# Patient Record
Sex: MALE | Race: BLACK OR AFRICAN AMERICAN | Employment: UNEMPLOYED | URBAN - METROPOLITAN AREA
[De-identification: names, ages, dates, MRNs, and addresses within clinical notes are randomized per-mention and may not be internally consistent; named-entity substitution may affect disease eponyms.]

---

## 2022-08-08 ENCOUNTER — HOSPITAL ENCOUNTER (INPATIENT)
Age: 42
LOS: 23 days | Discharge: NURSING, REHAB OR LONG-TERM CARE | DRG: 853 | End: 2022-09-01
Attending: EMERGENCY MEDICINE | Admitting: INTERNAL MEDICINE
Payer: MEDICARE

## 2022-08-08 ENCOUNTER — APPOINTMENT (OUTPATIENT)
Dept: GENERAL RADIOLOGY | Age: 42
DRG: 853 | End: 2022-08-08
Attending: EMERGENCY MEDICINE
Payer: MEDICARE

## 2022-08-08 DIAGNOSIS — S31.000D WOUND OF SACRAL REGION, SUBSEQUENT ENCOUNTER: ICD-10-CM

## 2022-08-08 DIAGNOSIS — N17.9: Primary | ICD-10-CM

## 2022-08-08 DIAGNOSIS — I48.0 PAF (PAROXYSMAL ATRIAL FIBRILLATION) (HCC): ICD-10-CM

## 2022-08-08 DIAGNOSIS — I50.20 HFREF (HEART FAILURE WITH REDUCED EJECTION FRACTION) (HCC): ICD-10-CM

## 2022-08-08 LAB
ALBUMIN SERPL-MCNC: 0.9 G/DL (ref 3.5–5)
ALBUMIN/GLOB SERPL: 0.2 {RATIO} (ref 1.1–2.2)
ALP SERPL-CCNC: 172 U/L (ref 45–117)
ALT SERPL-CCNC: 20 U/L (ref 12–78)
ANION GAP SERPL CALC-SCNC: 6 MMOL/L (ref 5–15)
AST SERPL-CCNC: 20 U/L (ref 15–37)
BASOPHILS # BLD: 0.1 K/UL (ref 0–0.1)
BASOPHILS NFR BLD: 1 % (ref 0–1)
BILIRUB SERPL-MCNC: 0.3 MG/DL (ref 0.2–1)
BUN SERPL-MCNC: 59 MG/DL (ref 6–20)
BUN/CREAT SERPL: 21 (ref 12–20)
CALCIUM SERPL-MCNC: 7.4 MG/DL (ref 8.5–10.1)
CHLORIDE SERPL-SCNC: 108 MMOL/L (ref 97–108)
CO2 SERPL-SCNC: 25 MMOL/L (ref 21–32)
CREAT SERPL-MCNC: 2.75 MG/DL (ref 0.7–1.3)
DIFFERENTIAL METHOD BLD: ABNORMAL
EOSINOPHIL # BLD: 0.1 K/UL (ref 0–0.4)
EOSINOPHIL NFR BLD: 2 % (ref 0–7)
ERYTHROCYTE [DISTWIDTH] IN BLOOD BY AUTOMATED COUNT: 15 % (ref 11.5–14.5)
GLOBULIN SER CALC-MCNC: 5.9 G/DL (ref 2–4)
GLUCOSE SERPL-MCNC: 130 MG/DL (ref 65–100)
HCT VFR BLD AUTO: 28.3 % (ref 36.6–50.3)
HGB BLD-MCNC: 8.9 G/DL (ref 12.1–17)
IMM GRANULOCYTES # BLD AUTO: 0 K/UL (ref 0–0.04)
IMM GRANULOCYTES NFR BLD AUTO: 0 % (ref 0–0.5)
LYMPHOCYTES # BLD: 1.5 K/UL (ref 0.8–3.5)
LYMPHOCYTES NFR BLD: 17 % (ref 12–49)
MCH RBC QN AUTO: 27.8 PG (ref 26–34)
MCHC RBC AUTO-ENTMCNC: 31.4 G/DL (ref 30–36.5)
MCV RBC AUTO: 88.4 FL (ref 80–99)
MONOCYTES # BLD: 0.9 K/UL (ref 0–1)
MONOCYTES NFR BLD: 10 % (ref 5–13)
NEUTS SEG # BLD: 6.4 K/UL (ref 1.8–8)
NEUTS SEG NFR BLD: 70 % (ref 32–75)
NRBC # BLD: 0 K/UL (ref 0–0.01)
NRBC BLD-RTO: 0 PER 100 WBC
PLATELET # BLD AUTO: 242 K/UL (ref 150–400)
PMV BLD AUTO: 11.4 FL (ref 8.9–12.9)
POTASSIUM SERPL-SCNC: 5.6 MMOL/L (ref 3.5–5.1)
PROT SERPL-MCNC: 6.8 G/DL (ref 6.4–8.2)
RBC # BLD AUTO: 3.2 M/UL (ref 4.1–5.7)
SODIUM SERPL-SCNC: 139 MMOL/L (ref 136–145)
WBC # BLD AUTO: 9.1 K/UL (ref 4.1–11.1)

## 2022-08-08 PROCEDURE — 74011250636 HC RX REV CODE- 250/636: Performed by: EMERGENCY MEDICINE

## 2022-08-08 PROCEDURE — 71045 X-RAY EXAM CHEST 1 VIEW: CPT

## 2022-08-08 PROCEDURE — 80053 COMPREHEN METABOLIC PANEL: CPT

## 2022-08-08 PROCEDURE — 99285 EMERGENCY DEPT VISIT HI MDM: CPT

## 2022-08-08 PROCEDURE — 94002 VENT MGMT INPAT INIT DAY: CPT

## 2022-08-08 PROCEDURE — 85025 COMPLETE CBC W/AUTO DIFF WBC: CPT

## 2022-08-08 PROCEDURE — 74011000250 HC RX REV CODE- 250: Performed by: NURSE PRACTITIONER

## 2022-08-08 PROCEDURE — 36415 COLL VENOUS BLD VENIPUNCTURE: CPT

## 2022-08-08 PROCEDURE — 51798 US URINE CAPACITY MEASURE: CPT

## 2022-08-08 RX ORDER — SODIUM CHLORIDE 9 MG/ML
500 INJECTION, SOLUTION INTRAVENOUS ONCE
Status: COMPLETED | OUTPATIENT
Start: 2022-08-08 | End: 2022-08-08

## 2022-08-08 RX ORDER — SODIUM CHLORIDE 0.9 % (FLUSH) 0.9 %
5-40 SYRINGE (ML) INJECTION EVERY 8 HOURS
Status: DISCONTINUED | OUTPATIENT
Start: 2022-08-08 | End: 2022-09-01 | Stop reason: HOSPADM

## 2022-08-08 RX ORDER — CHLORHEXIDINE GLUCONATE 1.2 MG/ML
15 RINSE ORAL EVERY 12 HOURS
Status: DISCONTINUED | OUTPATIENT
Start: 2022-08-08 | End: 2022-09-01 | Stop reason: HOSPADM

## 2022-08-08 RX ORDER — ONDANSETRON 4 MG/1
4 TABLET, ORALLY DISINTEGRATING ORAL
Status: DISCONTINUED | OUTPATIENT
Start: 2022-08-08 | End: 2022-09-01 | Stop reason: HOSPADM

## 2022-08-08 RX ORDER — ACETAMINOPHEN 325 MG/1
650 TABLET ORAL
Status: DISCONTINUED | OUTPATIENT
Start: 2022-08-08 | End: 2022-09-01 | Stop reason: HOSPADM

## 2022-08-08 RX ORDER — POLYETHYLENE GLYCOL 3350 17 G/17G
17 POWDER, FOR SOLUTION ORAL DAILY PRN
Status: DISCONTINUED | OUTPATIENT
Start: 2022-08-08 | End: 2022-09-01 | Stop reason: HOSPADM

## 2022-08-08 RX ORDER — ONDANSETRON 2 MG/ML
4 INJECTION INTRAMUSCULAR; INTRAVENOUS
Status: DISCONTINUED | OUTPATIENT
Start: 2022-08-08 | End: 2022-09-01 | Stop reason: HOSPADM

## 2022-08-08 RX ORDER — ACETAMINOPHEN 650 MG/1
650 SUPPOSITORY RECTAL
Status: DISCONTINUED | OUTPATIENT
Start: 2022-08-08 | End: 2022-09-01 | Stop reason: HOSPADM

## 2022-08-08 RX ORDER — SODIUM CHLORIDE 0.9 % (FLUSH) 0.9 %
5-40 SYRINGE (ML) INJECTION AS NEEDED
Status: DISCONTINUED | OUTPATIENT
Start: 2022-08-08 | End: 2022-09-01 | Stop reason: HOSPADM

## 2022-08-08 RX ADMIN — Medication 10 ML: at 22:00

## 2022-08-08 RX ADMIN — SODIUM CHLORIDE 500 ML: 900 INJECTION, SOLUTION INTRAVENOUS at 19:30

## 2022-08-08 NOTE — ED PROVIDER NOTES
Date of Service:  8/8/2022    Patient:  Thu Whitman    Chief Complaint:  Need for dialysis    HPI:  Thu Whitman is a 38 y.o.  male who presents for evaluation of need for CRRT. Patient from [address] [place] [address] where he had a complicated recent series of events including multiple PEA arrest, LifeVest placement and that is now been removed, currently septic who has been having worsening renal function and is in need of CRRT. Patient is a partial code, chemical medications only, no CPR. Currently trach in place. No other reported information       No past medical history on file. No past surgical history on file. No family history on file. Social History     Socioeconomic History    Marital status: Not on file     Spouse name: Not on file    Number of children: Not on file    Years of education: Not on file    Highest education level: Not on file   Occupational History    Not on file   Tobacco Use    Smoking status: Not on file    Smokeless tobacco: Not on file   Substance and Sexual Activity    Alcohol use: Not on file    Drug use: Not on file    Sexual activity: Not on file   Other Topics Concern    Not on file   Social History Narrative    Not on file     Social Determinants of Health     Financial Resource Strain: Not on file   Food Insecurity: Not on file   Transportation Needs: Not on file   Physical Activity: Not on file   Stress: Not on file   Social Connections: Not on file   Intimate Partner Violence: Not on file   Housing Stability: Not on file         ALLERGIES: Patient has no allergy information on record. Review of Systems   All other systems reviewed and are negative. There were no vitals filed for this visit. Physical Exam  Vitals and nursing note reviewed. Constitutional:       Appearance: Normal appearance. HENT:      Head: Normocephalic.       Nose: Nose normal.      Mouth/Throat:      Mouth: Mucous membranes are moist.   Eyes:      Extraocular Movements: Extraocular movements intact. Cardiovascular:      Rate and Rhythm: Normal rate. Pulmonary:      Effort: Pulmonary effort is normal.   Abdominal:      General: Abdomen is flat. Musculoskeletal:         General: No deformity. Skin:     General: Skin is warm. Capillary Refill: Capillary refill takes less than 2 seconds. Neurological:      Mental Status: Mental status is at baseline. Psychiatric:         Mood and Affect: Mood normal.        MDM     VITAL SIGNS:  Patient Vitals for the past 4 hrs:   Temp Pulse Resp BP SpO2   08/08/22 2031 98 °F (36.7 °C) 86 17 108/89 100 %   08/08/22 1929 98 °F (36.7 °C) 95 24 (!) 80/68 100 %         LABS:  Recent Results (from the past 6 hour(s))   CBC WITH AUTOMATED DIFF    Collection Time: 08/08/22  7:36 PM   Result Value Ref Range    WBC 9.1 4.1 - 11.1 K/uL    RBC 3.20 (L) 4.10 - 5.70 M/uL    HGB 8.9 (L) 12.1 - 17.0 g/dL    HCT 28.3 (L) 36.6 - 50.3 %    MCV 88.4 80.0 - 99.0 FL    MCH 27.8 26.0 - 34.0 PG    MCHC 31.4 30.0 - 36.5 g/dL    RDW 15.0 (H) 11.5 - 14.5 %    PLATELET 841 606 - 923 K/uL    MPV 11.4 8.9 - 12.9 FL    NRBC 0.0 0  WBC    ABSOLUTE NRBC 0.00 0.00 - 0.01 K/uL    NEUTROPHILS 70 32 - 75 %    LYMPHOCYTES 17 12 - 49 %    MONOCYTES 10 5 - 13 %    EOSINOPHILS 2 0 - 7 %    BASOPHILS 1 0 - 1 %    IMMATURE GRANULOCYTES 0 0.0 - 0.5 %    ABS. NEUTROPHILS 6.4 1.8 - 8.0 K/UL    ABS. LYMPHOCYTES 1.5 0.8 - 3.5 K/UL    ABS. MONOCYTES 0.9 0.0 - 1.0 K/UL    ABS. EOSINOPHILS 0.1 0.0 - 0.4 K/UL    ABS. BASOPHILS 0.1 0.0 - 0.1 K/UL    ABS. IMM.  GRANS. 0.0 0.00 - 0.04 K/UL    DF AUTOMATED     METABOLIC PANEL, COMPREHENSIVE    Collection Time: 08/08/22  7:36 PM   Result Value Ref Range    Sodium 139 136 - 145 mmol/L    Potassium 5.6 (H) 3.5 - 5.1 mmol/L    Chloride 108 97 - 108 mmol/L    CO2 25 21 - 32 mmol/L    Anion gap 6 5 - 15 mmol/L    Glucose 130 (H) 65 - 100 mg/dL    BUN 59 (H) 6 - 20 MG/DL    Creatinine 2.75 (H) 0.70 - 1.30 MG/DL    BUN/Creatinine ratio 21 (H) 12 - 20      GFR est AA 31 (L) >60 ml/min/1.73m2    GFR est non-AA 25 (L) >60 ml/min/1.73m2    Calcium 7.4 (L) 8.5 - 10.1 MG/DL    Bilirubin, total 0.3 0.2 - 1.0 MG/DL    ALT (SGPT) 20 12 - 78 U/L    AST (SGOT) 20 15 - 37 U/L    Alk. phosphatase 172 (H) 45 - 117 U/L    Protein, total 6.8 6.4 - 8.2 g/dL    Albumin 0.9 (L) 3.5 - 5.0 g/dL    Globulin 5.9 (H) 2.0 - 4.0 g/dL    A-G Ratio 0.2 (L) 1.1 - 2.2          IMAGING:  XR CHEST PORT   Final Result   Bibasilar haziness as discussed. Medications During Visit:  Medications   0.9% sodium chloride infusion 500 mL (500 mL IntraVENous New Bag 8/8/22 1930)         DECISION MAKING:  Pat Mace is a 38 y.o. male who comes in as above. Here, patient is awake alert answer questions yes or no but shaking his head. Blood pressure is improving. Currently under treatment for sepsis related to pneumonia. I have spoken with Dr. Candace Lamb with [address] [place] [address]. He is reaching out to [address] [place] [address]. At this time patient will be admitted to the ICU given his need for ventilator management. I discussed CRRT with nephrology, this is the reason he was sent here. IMPRESSION:  1.  Encounter for continuous renal replacement therapy (CRRT) for acute renal failure ([facility]        DISPOSITION:  Admitted      Procedures

## 2022-08-08 NOTE — ED TRIAGE NOTES
Pt arrives from [facility] for CRRT, pt is on trach/vent collar, has loya and RUE PICC in place. Pt reports no pain. Hypotensive but not tachy. Pt is chemical code, no compressions but meds are ok.

## 2022-08-09 ENCOUNTER — APPOINTMENT (OUTPATIENT)
Dept: ULTRASOUND IMAGING | Age: 42
DRG: 853 | End: 2022-08-09
Attending: INTERNAL MEDICINE
Payer: MEDICARE

## 2022-08-09 PROBLEM — N17.9 AKI (ACUTE KIDNEY INJURY) (HCC): Status: ACTIVE | Noted: 2022-08-09

## 2022-08-09 LAB
ANION GAP SERPL CALC-SCNC: 4 MMOL/L (ref 5–15)
APPEARANCE UR: ABNORMAL
ATRIAL RATE: 79 BPM
BACTERIA URNS QL MICRO: ABNORMAL /HPF
BILIRUB UR QL: NEGATIVE
BUN SERPL-MCNC: 63 MG/DL (ref 6–20)
BUN/CREAT SERPL: 22 (ref 12–20)
CALCIUM SERPL-MCNC: 7.7 MG/DL (ref 8.5–10.1)
CALCULATED P AXIS, ECG09: 86 DEGREES
CALCULATED R AXIS, ECG10: -21 DEGREES
CALCULATED T AXIS, ECG11: -156 DEGREES
CHLORIDE SERPL-SCNC: 110 MMOL/L (ref 97–108)
CO2 SERPL-SCNC: 26 MMOL/L (ref 21–32)
COLOR UR: ABNORMAL
CREAT SERPL-MCNC: 2.88 MG/DL (ref 0.7–1.3)
DIAGNOSIS, 93000: NORMAL
EPITH CASTS URNS QL MICRO: ABNORMAL /LPF
ERYTHROCYTE [DISTWIDTH] IN BLOOD BY AUTOMATED COUNT: 15 % (ref 11.5–14.5)
GLUCOSE BLD STRIP.AUTO-MCNC: 141 MG/DL (ref 65–117)
GLUCOSE BLD STRIP.AUTO-MCNC: 36 MG/DL (ref 65–117)
GLUCOSE BLD STRIP.AUTO-MCNC: 82 MG/DL (ref 65–117)
GLUCOSE BLD STRIP.AUTO-MCNC: 87 MG/DL (ref 65–117)
GLUCOSE SERPL-MCNC: 34 MG/DL (ref 65–100)
GLUCOSE UR STRIP.AUTO-MCNC: NEGATIVE MG/DL
GRAN CASTS URNS QL MICRO: ABNORMAL /LPF
HBV SURFACE AB SER QL: NONREACTIVE
HBV SURFACE AB SER-ACNC: <3.1 MIU/ML
HBV SURFACE AG SER QL: <0.1 INDEX
HBV SURFACE AG SER QL: NEGATIVE
HCT VFR BLD AUTO: 27.8 % (ref 36.6–50.3)
HGB BLD-MCNC: 8.9 G/DL (ref 12.1–17)
HGB UR QL STRIP: ABNORMAL
KETONES UR QL STRIP.AUTO: NEGATIVE MG/DL
LEUKOCYTE ESTERASE UR QL STRIP.AUTO: ABNORMAL
MAGNESIUM SERPL-MCNC: 2.2 MG/DL (ref 1.6–2.4)
MCH RBC QN AUTO: 27.8 PG (ref 26–34)
MCHC RBC AUTO-ENTMCNC: 32 G/DL (ref 30–36.5)
MCV RBC AUTO: 86.9 FL (ref 80–99)
NITRITE UR QL STRIP.AUTO: NEGATIVE
NRBC # BLD: 0 K/UL (ref 0–0.01)
NRBC BLD-RTO: 0 PER 100 WBC
P-R INTERVAL, ECG05: 200 MS
PH UR STRIP: 6.5 [PH] (ref 5–8)
PLATELET # BLD AUTO: 198 K/UL (ref 150–400)
PMV BLD AUTO: 11.5 FL (ref 8.9–12.9)
POTASSIUM SERPL-SCNC: 5.5 MMOL/L (ref 3.5–5.1)
PROCALCITONIN SERPL-MCNC: 1.79 NG/ML
PROT UR STRIP-MCNC: 100 MG/DL
Q-T INTERVAL, ECG07: 454 MS
QRS DURATION, ECG06: 160 MS
QTC CALCULATION (BEZET), ECG08: 520 MS
RBC # BLD AUTO: 3.2 M/UL (ref 4.1–5.7)
RBC #/AREA URNS HPF: ABNORMAL /HPF (ref 0–5)
SERVICE CMNT-IMP: ABNORMAL
SERVICE CMNT-IMP: ABNORMAL
SERVICE CMNT-IMP: NORMAL
SERVICE CMNT-IMP: NORMAL
SODIUM SERPL-SCNC: 140 MMOL/L (ref 136–145)
SP GR UR REFRACTOMETRY: 1.01 (ref 1–1.03)
UA: UC IF INDICATED,UAUC: ABNORMAL
UROBILINOGEN UR QL STRIP.AUTO: 0.2 EU/DL (ref 0.2–1)
VENTRICULAR RATE, ECG03: 79 BPM
WBC # BLD AUTO: 8.2 K/UL (ref 4.1–11.1)
WBC URNS QL MICRO: ABNORMAL /HPF (ref 0–4)
YEAST BUDDING URNS QL: PRESENT
YEAST URNS QL MICRO: PRESENT

## 2022-08-09 PROCEDURE — 74011000250 HC RX REV CODE- 250: Performed by: INTERNAL MEDICINE

## 2022-08-09 PROCEDURE — P9045 ALBUMIN (HUMAN), 5%, 250 ML: HCPCS

## 2022-08-09 PROCEDURE — 74011250636 HC RX REV CODE- 250/636: Performed by: INTERNAL MEDICINE

## 2022-08-09 PROCEDURE — 74011000258 HC RX REV CODE- 258: Performed by: INTERNAL MEDICINE

## 2022-08-09 PROCEDURE — 83735 ASSAY OF MAGNESIUM: CPT

## 2022-08-09 PROCEDURE — 36600 WITHDRAWAL OF ARTERIAL BLOOD: CPT

## 2022-08-09 PROCEDURE — 5A1D70Z PERFORMANCE OF URINARY FILTRATION, INTERMITTENT, LESS THAN 6 HOURS PER DAY: ICD-10-PCS | Performed by: STUDENT IN AN ORGANIZED HEALTH CARE EDUCATION/TRAINING PROGRAM

## 2022-08-09 PROCEDURE — 87077 CULTURE AEROBIC IDENTIFY: CPT

## 2022-08-09 PROCEDURE — 87040 BLOOD CULTURE FOR BACTERIA: CPT

## 2022-08-09 PROCEDURE — 74011250636 HC RX REV CODE- 250/636: Performed by: STUDENT IN AN ORGANIZED HEALTH CARE EDUCATION/TRAINING PROGRAM

## 2022-08-09 PROCEDURE — 77030018798 HC PMP KT ENTRL FED COVD -A

## 2022-08-09 PROCEDURE — 36415 COLL VENOUS BLD VENIPUNCTURE: CPT

## 2022-08-09 PROCEDURE — 74011000250 HC RX REV CODE- 250: Performed by: STUDENT IN AN ORGANIZED HEALTH CARE EDUCATION/TRAINING PROGRAM

## 2022-08-09 PROCEDURE — 87070 CULTURE OTHR SPECIMN AEROBIC: CPT

## 2022-08-09 PROCEDURE — 5A1D90Z PERFORMANCE OF URINARY FILTRATION, CONTINUOUS, GREATER THAN 18 HOURS PER DAY: ICD-10-PCS | Performed by: STUDENT IN AN ORGANIZED HEALTH CARE EDUCATION/TRAINING PROGRAM

## 2022-08-09 PROCEDURE — 87086 URINE CULTURE/COLONY COUNT: CPT

## 2022-08-09 PROCEDURE — 74011250636 HC RX REV CODE- 250/636

## 2022-08-09 PROCEDURE — 74011250637 HC RX REV CODE- 250/637: Performed by: NURSE PRACTITIONER

## 2022-08-09 PROCEDURE — 85027 COMPLETE CBC AUTOMATED: CPT

## 2022-08-09 PROCEDURE — 84145 PROCALCITONIN (PCT): CPT

## 2022-08-09 PROCEDURE — 87340 HEPATITIS B SURFACE AG IA: CPT

## 2022-08-09 PROCEDURE — 90935 HEMODIALYSIS ONE EVALUATION: CPT

## 2022-08-09 PROCEDURE — 93005 ELECTROCARDIOGRAM TRACING: CPT

## 2022-08-09 PROCEDURE — 82962 GLUCOSE BLOOD TEST: CPT

## 2022-08-09 PROCEDURE — 65620000000 HC RM CCU GENERAL

## 2022-08-09 PROCEDURE — 87186 SC STD MICRODIL/AGAR DIL: CPT

## 2022-08-09 PROCEDURE — 74011000258 HC RX REV CODE- 258: Performed by: STUDENT IN AN ORGANIZED HEALTH CARE EDUCATION/TRAINING PROGRAM

## 2022-08-09 PROCEDURE — 74011000250 HC RX REV CODE- 250: Performed by: NURSE PRACTITIONER

## 2022-08-09 PROCEDURE — 94002 VENT MGMT INPAT INIT DAY: CPT

## 2022-08-09 PROCEDURE — 86706 HEP B SURFACE ANTIBODY: CPT

## 2022-08-09 PROCEDURE — 82533 TOTAL CORTISOL: CPT

## 2022-08-09 PROCEDURE — 80048 BASIC METABOLIC PNL TOTAL CA: CPT

## 2022-08-09 PROCEDURE — 81001 URINALYSIS AUTO W/SCOPE: CPT

## 2022-08-09 PROCEDURE — 5A1955Z RESPIRATORY VENTILATION, GREATER THAN 96 CONSECUTIVE HOURS: ICD-10-PCS | Performed by: STUDENT IN AN ORGANIZED HEALTH CARE EDUCATION/TRAINING PROGRAM

## 2022-08-09 PROCEDURE — 90945 DIALYSIS ONE EVALUATION: CPT

## 2022-08-09 PROCEDURE — 76770 US EXAM ABDO BACK WALL COMP: CPT

## 2022-08-09 RX ORDER — ATORVASTATIN CALCIUM 40 MG/1
40 TABLET, FILM COATED ORAL
COMMUNITY

## 2022-08-09 RX ORDER — IPRATROPIUM BROMIDE AND ALBUTEROL SULFATE 2.5; .5 MG/3ML; MG/3ML
3 SOLUTION RESPIRATORY (INHALATION)
Status: DISCONTINUED | OUTPATIENT
Start: 2022-08-09 | End: 2022-09-01 | Stop reason: HOSPADM

## 2022-08-09 RX ORDER — MIDODRINE HYDROCHLORIDE 5 MG/1
10 TABLET ORAL 3 TIMES DAILY
COMMUNITY

## 2022-08-09 RX ORDER — FLUDROCORTISONE ACETATE 0.1 MG/1
0.1 TABLET ORAL DAILY
COMMUNITY

## 2022-08-09 RX ORDER — HEPARIN SODIUM 10000 [USP'U]/100ML
500 INJECTION, SOLUTION INTRAVENOUS CONTINUOUS
Status: DISCONTINUED | OUTPATIENT
Start: 2022-08-09 | End: 2022-08-18

## 2022-08-09 RX ORDER — SODIUM CHLORIDE FOR INHALATION 7 %
4 VIAL, NEBULIZER (ML) INHALATION 2 TIMES DAILY
COMMUNITY

## 2022-08-09 RX ORDER — ATORVASTATIN CALCIUM 40 MG/1
40 TABLET, FILM COATED ORAL
Status: DISCONTINUED | OUTPATIENT
Start: 2022-08-09 | End: 2022-09-01 | Stop reason: HOSPADM

## 2022-08-09 RX ORDER — HYDROCODONE BITARTRATE AND ACETAMINOPHEN 5; 325 MG/1; MG/1
1 TABLET ORAL
COMMUNITY

## 2022-08-09 RX ORDER — NOREPINEPHRINE BITARTRATE/D5W 8 MG/250ML
.5-3 PLASTIC BAG, INJECTION (ML) INTRAVENOUS
Status: DISCONTINUED | OUTPATIENT
Start: 2022-08-09 | End: 2022-08-18

## 2022-08-09 RX ORDER — FLUDROCORTISONE ACETATE 0.1 MG/1
0.1 TABLET ORAL DAILY
Status: DISCONTINUED | OUTPATIENT
Start: 2022-08-09 | End: 2022-09-01 | Stop reason: HOSPADM

## 2022-08-09 RX ORDER — GLYCOPYRROLATE 1 MG/1
1 TABLET ORAL
COMMUNITY

## 2022-08-09 RX ORDER — ALBUTEROL SULFATE 0.83 MG/ML
2.5 SOLUTION RESPIRATORY (INHALATION)
COMMUNITY

## 2022-08-09 RX ORDER — FERROUS SULFATE 300 MG/5ML
300 LIQUID (ML) ORAL DAILY
COMMUNITY

## 2022-08-09 RX ORDER — ALBUTEROL SULFATE 0.83 MG/ML
2.5 SOLUTION RESPIRATORY (INHALATION) 2 TIMES DAILY
COMMUNITY

## 2022-08-09 RX ORDER — FERROUS SULFATE 300 MG/5ML
300 LIQUID (ML) ORAL
Status: DISCONTINUED | OUTPATIENT
Start: 2022-08-09 | End: 2022-09-01 | Stop reason: HOSPADM

## 2022-08-09 RX ORDER — ASCORBIC ACID 500 MG
500 TABLET ORAL
COMMUNITY

## 2022-08-09 RX ORDER — INSULIN GLARGINE 100 [IU]/ML
5 INJECTION, SOLUTION SUBCUTANEOUS
COMMUNITY

## 2022-08-09 RX ORDER — HEPARIN SODIUM 200 [USP'U]/100ML
0-10 INJECTION, SOLUTION INTRAVENOUS CONTINUOUS
Status: DISCONTINUED | OUTPATIENT
Start: 2022-08-09 | End: 2022-08-09

## 2022-08-09 RX ORDER — MEXILETINE HYDROCHLORIDE 150 MG/1
150 CAPSULE ORAL 2 TIMES DAILY
COMMUNITY

## 2022-08-09 RX ORDER — MELATONIN
1000 DAILY
COMMUNITY

## 2022-08-09 RX ORDER — MIDODRINE HYDROCHLORIDE 5 MG/1
10 TABLET ORAL
Status: DISCONTINUED | OUTPATIENT
Start: 2022-08-09 | End: 2022-08-11

## 2022-08-09 RX ORDER — FUROSEMIDE 40 MG/1
40 TABLET ORAL DAILY
Status: DISCONTINUED | OUTPATIENT
Start: 2022-08-09 | End: 2022-08-09

## 2022-08-09 RX ORDER — ALBUMIN HUMAN 50 G/1000ML
12.5 SOLUTION INTRAVENOUS ONCE
Status: COMPLETED | OUTPATIENT
Start: 2022-08-09 | End: 2022-08-09

## 2022-08-09 RX ORDER — LEVOTHYROXINE SODIUM 50 UG/1
50 TABLET ORAL
COMMUNITY

## 2022-08-09 RX ORDER — SACUBITRIL AND VALSARTAN 24; 26 MG/1; MG/1
0.5 TABLET, FILM COATED ORAL 2 TIMES DAILY
COMMUNITY

## 2022-08-09 RX ORDER — ALBUMIN HUMAN 50 G/1000ML
SOLUTION INTRAVENOUS
Status: COMPLETED
Start: 2022-08-09 | End: 2022-08-09

## 2022-08-09 RX ORDER — HEPARIN SODIUM 200 [USP'U]/100ML
500 INJECTION, SOLUTION INTRAVENOUS CONTINUOUS
Status: DISCONTINUED | OUTPATIENT
Start: 2022-08-09 | End: 2022-08-09

## 2022-08-09 RX ORDER — VANCOMYCIN 1.75 GRAM/500 ML IN 0.9 % SODIUM CHLORIDE INTRAVENOUS
1750 ONCE
Status: COMPLETED | OUTPATIENT
Start: 2022-08-09 | End: 2022-08-09

## 2022-08-09 RX ORDER — MIDAZOLAM HYDROCHLORIDE 1 MG/ML
1 INJECTION, SOLUTION INTRAMUSCULAR; INTRAVENOUS ONCE
Status: COMPLETED | OUTPATIENT
Start: 2022-08-09 | End: 2022-08-09

## 2022-08-09 RX ORDER — MEXILETINE HYDROCHLORIDE 150 MG/1
150 CAPSULE ORAL EVERY 12 HOURS
Status: DISCONTINUED | OUTPATIENT
Start: 2022-08-09 | End: 2022-09-01 | Stop reason: HOSPADM

## 2022-08-09 RX ORDER — ALPRAZOLAM 0.25 MG/1
0.25 TABLET ORAL
COMMUNITY

## 2022-08-09 RX ORDER — LEVOTHYROXINE SODIUM 50 UG/1
50 TABLET ORAL
Status: DISCONTINUED | OUTPATIENT
Start: 2022-08-09 | End: 2022-09-01 | Stop reason: HOSPADM

## 2022-08-09 RX ORDER — ZINC SULFATE 50(220)MG
1 CAPSULE ORAL DAILY
COMMUNITY

## 2022-08-09 RX ORDER — CHOLESTYRAMINE 4 G/4.8G
4 POWDER, FOR SUSPENSION ORAL 2 TIMES DAILY
COMMUNITY

## 2022-08-09 RX ORDER — CHOLESTYRAMINE 4 G/4.8G
4 POWDER, FOR SUSPENSION ORAL
Status: DISCONTINUED | OUTPATIENT
Start: 2022-08-09 | End: 2022-09-01 | Stop reason: HOSPADM

## 2022-08-09 RX ORDER — ONDANSETRON 2 MG/ML
4 INJECTION INTRAMUSCULAR; INTRAVENOUS
COMMUNITY

## 2022-08-09 RX ORDER — INSULIN GLARGINE 100 [IU]/ML
5 INJECTION, SOLUTION SUBCUTANEOUS DAILY
Status: DISCONTINUED | OUTPATIENT
Start: 2022-08-09 | End: 2022-09-01 | Stop reason: HOSPADM

## 2022-08-09 RX ORDER — LANOLIN ALCOHOL/MO/W.PET/CERES
3 CREAM (GRAM) TOPICAL
COMMUNITY

## 2022-08-09 RX ADMIN — CHOLESTYRAMINE 4 G: 4 POWDER, FOR SUSPENSION ORAL at 18:06

## 2022-08-09 RX ADMIN — CALCIUM CHLORIDE, MAGNESIUM CHLORIDE, DEXTROSE MONOHYDRATE, LACTIC ACID, SODIUM CHLORIDE, SODIUM BICARBONATE AND POTASSIUM CHLORIDE: 5.15; 2.03; 22; 5.4; 6.46; 3.09; .157 INJECTION INTRAVENOUS at 22:57

## 2022-08-09 RX ADMIN — DEXTROSE MONOHYDRATE 250 ML: 10 INJECTION, SOLUTION INTRAVENOUS at 07:45

## 2022-08-09 RX ADMIN — CHLORHEXIDINE GLUCONATE 15 ML: 1.2 RINSE ORAL at 09:38

## 2022-08-09 RX ADMIN — MIDODRINE HYDROCHLORIDE 10 MG: 5 TABLET ORAL at 09:41

## 2022-08-09 RX ADMIN — ALBUMIN HUMAN 12.5 G: 50 SOLUTION INTRAVENOUS at 22:00

## 2022-08-09 RX ADMIN — MINERAL SUPPLEMENT IRON 300 MG / 5 ML STRENGTH LIQUID 100 PER BOX UNFLAVORED 300 MG: at 16:19

## 2022-08-09 RX ADMIN — ALBUMIN (HUMAN) 12.5 G: 12.5 INJECTION, SOLUTION INTRAVENOUS at 22:00

## 2022-08-09 RX ADMIN — HEPARIN SODIUM 1400 UNITS: 1000 INJECTION INTRAVENOUS; SUBCUTANEOUS at 13:59

## 2022-08-09 RX ADMIN — VANCOMYCIN HYDROCHLORIDE 1750 MG: 10 INJECTION, POWDER, LYOPHILIZED, FOR SOLUTION INTRAVENOUS at 17:24

## 2022-08-09 RX ADMIN — LEVOTHYROXINE SODIUM 50 MCG: 0.05 TABLET ORAL at 09:38

## 2022-08-09 RX ADMIN — APIXABAN 5 MG: 5 TABLET, FILM COATED ORAL at 14:00

## 2022-08-09 RX ADMIN — MINERAL SUPPLEMENT IRON 300 MG / 5 ML STRENGTH LIQUID 100 PER BOX UNFLAVORED 300 MG: at 09:38

## 2022-08-09 RX ADMIN — PIPERACILLIN AND TAZOBACTAM 4.5 G: 4; .5 INJECTION, POWDER, LYOPHILIZED, FOR SOLUTION INTRAVENOUS at 17:25

## 2022-08-09 RX ADMIN — Medication 10 ML: at 14:03

## 2022-08-09 RX ADMIN — NOREPINEPHRINE BITARTRATE 4 MCG/MIN: 1 INJECTION, SOLUTION, CONCENTRATE INTRAVENOUS at 18:58

## 2022-08-09 RX ADMIN — MIDODRINE HYDROCHLORIDE 10 MG: 5 TABLET ORAL at 16:20

## 2022-08-09 RX ADMIN — HEPARIN SODIUM 1400 UNITS: 1000 INJECTION INTRAVENOUS; SUBCUTANEOUS at 20:27

## 2022-08-09 RX ADMIN — MIDAZOLAM 1 MG: 1 INJECTION INTRAMUSCULAR; INTRAVENOUS at 20:15

## 2022-08-09 RX ADMIN — PIPERACILLIN AND TAZOBACTAM 3.38 G: 3; .375 INJECTION, POWDER, LYOPHILIZED, FOR SOLUTION INTRAVENOUS at 22:13

## 2022-08-09 RX ADMIN — MINERAL SUPPLEMENT IRON 300 MG / 5 ML STRENGTH LIQUID 100 PER BOX UNFLAVORED 300 MG: at 13:53

## 2022-08-09 RX ADMIN — CALCIUM CHLORIDE, MAGNESIUM CHLORIDE, DEXTROSE MONOHYDRATE, LACTIC ACID, SODIUM CHLORIDE, SODIUM BICARBONATE AND POTASSIUM CHLORIDE: 5.15; 2.03; 22; 5.4; 6.46; 3.09; .157 INJECTION INTRAVENOUS at 22:56

## 2022-08-09 RX ADMIN — HEPARIN SODIUM 1400 UNITS: 1000 INJECTION INTRAVENOUS; SUBCUTANEOUS at 14:00

## 2022-08-09 RX ADMIN — CALCIUM CHLORIDE, MAGNESIUM CHLORIDE, DEXTROSE MONOHYDRATE, LACTIC ACID, SODIUM CHLORIDE, SODIUM BICARBONATE AND POTASSIUM CHLORIDE: 5.15; 2.03; 22; 5.4; 6.46; 3.09; .157 INJECTION INTRAVENOUS at 22:55

## 2022-08-09 RX ADMIN — ATORVASTATIN CALCIUM 40 MG: 40 TABLET, FILM COATED ORAL at 22:14

## 2022-08-09 RX ADMIN — HEPARIN SODIUM AND DEXTROSE 500 UNITS/HR: 10000; 5 INJECTION INTRAVENOUS at 23:30

## 2022-08-09 RX ADMIN — MEXILETINE HYDROCHLORIDE 150 MG: 150 CAPSULE ORAL at 16:19

## 2022-08-09 RX ADMIN — CHLORHEXIDINE GLUCONATE 15 ML: 1.2 RINSE ORAL at 21:00

## 2022-08-09 RX ADMIN — FLUDROCORTISONE ACETATE 0.1 MG: 0.1 TABLET ORAL at 09:41

## 2022-08-09 RX ADMIN — CHOLESTYRAMINE 4 G: 4 POWDER, FOR SUSPENSION ORAL at 09:38

## 2022-08-09 NOTE — CONSULTS
NEPHROLOGY CONSULT NOTE     Patient: Ochoa Casper MRN: [id number]  PCP: Felicitas Pacheco MD   :     1980  Age:   38 y.o. Sex:  male      Referring physician: Beryl Shetty MD  Reason for consultation: 38 y.o. male with YADY (acute kidney injury) ([facility] [N17.9]   Admission Date: 2022  7:15 PM  LOS: 0 days     DISCUSSION / PLAN :   Acute kidney injury due to sepsis  -agree with holding Entresto, will also hold lasix  -will try HD today, if does not tolerate will switch to CRRT  -monitor UO  -check renal US  - Please avoid nephrotoxins such as NSAIDs, aminoglycosides and iodinated contrast agents. Avoid hypotension  - Adjust medications to GFR     Hypoglycemia, restart Tube feed-management per ICU team    HFrEF, EF 15-20%, Entresto on hold  -had lifevest which has been removed    Anemia, check iron panel    Recent Sepeis/klebsiella pneumonia Bacteremia-currently not on antibiotics  Recent UTI/pseudomonas and acinetobacter  Chronic hypotension, on florinef and midodrine  Afib on eliquis  H/o PE  Chronic resp failure, s/p trach, vent dependent  Paraplegia  Chronic Suprapubic catheter    Poor prognosis with multiple comorbidiies. Goals of care need to be discussed with family     Active Problems / Assessment AAActive  : Active Problems:    YADY (acute kidney injury) ([facility] (2022)         Subjective:   HPI: Ochoa Casper is a 38 y.o.  male who has been admitted to the hospital for possible need of CRRT. He has h/o CHF with EF 20-25% paraplegic, decubitus ulcer who has been [facility]ferred from [facility] to ER for hypotension and need for CRRT. He was seen by our group at [facility] for YADY and anuria. Patient was primarily hypotensive on pressors but was able to come off pressors and  was started on HD. POA want every thing to be done. He was not able to tolerate HD due to hypotension. He has a Rt Temp HD catheter.   He had lifevest which has been removed    Currently SBP is 100, has 600 ml urine via suprapubic catheter, He is on Entresto which is held this am. He is also on po lasix    Past Medical Hx:   Chronic resp failure on vent, afib, h/o PE, sepsis, UTI, decubitus ulcer, paraplegia,     Past Surgical Hx:   No past surgical history on file. Medications:  Prior to Admission medications    Medication Sig Start Date End Date Taking? Authorizing Provider   atorvastatin (LIPITOR) 40 mg tablet Take 40 mg by mouth nightly. PTA Med List from [address] [place] [address] 8/8/22   Yes Provider, Historical   albuterol (PROVENTIL VENTOLIN) 2.5 mg /3 mL (0.083 %) nebu 2.5 mg by Nebulization route three (3) times daily as needed for Wheezing. PTA Med List from [address] [place] [address] 8/8/22   Yes Provider, Historical   albuterol (PROVENTIL VENTOLIN) 2.5 mg /3 mL (0.083 %) nebu 2.5 mg by Nebulization route two (2) times a day. PTA Med List from [address] [place] [address] 8/8/22   Yes Provider, Historical   sacubitriL-valsartan Cookie Nilson) 24-26 mg tablet Take 0.5 Tablets by mouth two (2) times a day. PTA Med List from [address] [place] [address] 8/8/22   Yes Provider, Historical   apixaban (Eliquis) 5 mg tablet Take 5 mg by mouth two (2) times a day. PTA Med List from [address] [place] [address] 8/8/22   Yes Provider, Historical   ferrous sulfate 300 mg (60 mg iron)/5 mL syrup Take 300 mg by mouth in the morning. PTA Med List from [address] [place] [address] 8/8/22   Yes Provider, Historical   fludrocortisone (FLORINEF) 0.1 mg tablet Take 0.1 mg by mouth in the morning. PTA Med List from [address] [place] [address] 8/8/22   Yes Provider, Historical   sodium chloride (Hyper-SaL) 7 % nebulizer solution 4 mL by Nebulization route two (2) times a day. PTA Med List from [address] [place] [address] 8/8/22   Yes Provider, Historical   insulin glargine (Lantus U-100 Insulin) 100 unit/mL injection 5 Units by SubCUTAneous route nightly. PTA Med List from [address] [place] [address] 8/8/22   Yes Provider, Historical   melatonin 3 mg tablet Take 3 mg by mouth nightly as needed for Insomnia.  PTA Med List from [facility] 8/8/22   Yes Provider, Historical   mexiletine (MEXITIL) 150 mg capsule Take 150 mg by mouth two (2) times a day. PTA Med List from [facility] 8/8/22   Yes Provider, Historical   midodrine (PROAMATINE) 5 mg tablet Take 10 mg by mouth three (3) times daily. PTA Med List from [facility] 8/8/22   Yes Provider, Historical   cholestyramine-aspartame (QUESTRAN LIGHT) 4 gram packet Take 4 g by mouth two (2) times a day. PTA Med List from [facility] 8/8/22   Yes Provider, Historical   glycopyrrolate (RobinuL) 1 mg tablet Take 1 mg by mouth every eight (8) hours as needed. PTA Med List from [facility] 8/8/22   Yes Provider, Historical   levothyroxine (synthroid) 50 mcg tablet Take 50 mcg by mouth Daily (before breakfast). PTA Med List from [facility] 8/8/22   Yes Provider, Historical   HYDROcodone-acetaminophen (NORCO) 5-325 mg per tablet Take 1 Tablet by mouth every four (4) hours as needed for Pain. PTA Med List from [facility] 8/8/22   Yes Provider, Historical   ascorbic acid, vitamin C, (VITAMIN C) 500 mg tablet Take 500 mg by mouth nightly. PTA Med List from [facility] 8/8/22   Yes Provider, Historical   cholecalciferol (VITAMIN D3) (1000 Units /25 mcg) tablet Take 1,000 Units by mouth in the morning. PTA Med List from [facility] 8/8/22   Yes Provider, Historical   ALPRAZolam (Xanax) 0.25 mg tablet Take 0.25 mg by mouth every eight (8) hours as needed for Anxiety. PTA Med List from [facility] 8/8/22   Yes Provider, Historical   zinc sulfate (ZINCATE) 50 mg zinc (220 mg) capsule Take 1 Capsule by mouth in the morning. PTA Med List from [facility] 8/8/22   Yes Provider, Historical   ondansetron (ZOFRAN) 4 mg/2 mL soln 4 mg by IntraVENous route every four (4) hours as needed for Nausea. PTA Med List from [facility] 8/8/22   Yes Provider, Historical   insulin regular (HumuLIN R Regular U-100 Insuln) 100 unit/mL injection by SubCUTAneous route every six (6) hours as needed. PTA Med List from [facility] 8/8/22   Yes Provider, Historical   TIGEcycline 50 mg IVPB 50 mg by IntraVENous route every twelve (12) hours.  PTA Med List from [facility] 8/8/22   Yes Provider, Historical Allergies   Allergen Reactions    Tape [Adhesive] Rash       Social Hx:     Unable to obtain    Review of Systems:  Unable to obtain     Objective:    Vitals:    Vitals:    08/09/22 0645 08/09/22 0715 08/09/22 0730 08/09/22 0800   BP: 103/83 122/74  109/83   Pulse: 74 76 73 63   Resp:   13    Temp:       SpO2: 100%  94%    Weight:       Height:         I&O's:  08/08 0701 - 08/09 0700  In: 500 [I.V.:500]  Out: 600 [Urine:600]  Visit Vitals  /83   Pulse 63   Temp 98.1 °F (36.7 °C)   Resp 13   Ht 5' 8\" (1.727 m)   Wt 68.8 kg (151 lb 10.8 oz)   SpO2 94%   BMI 23.06 kg/m²       Physical Exam:  General: sleepy on vent  Neck:trach, RT IJ temp HD cath  Lungs : Clears to auscultation Bilaterally, on vent  CVS: RRR, S1 S2 normal, No rub,   Abdomen: Soft, Non tender,PEG+, bowel sounds present-suprapubic catheter  Extremities: trace edema  Skin: dry-ulcers  Neurologic: sleepy, on vent  Psych: unable to eval    Laboratory Results:    Recent Results (from the past 24 hour(s))   CBC WITH AUTOMATED DIFF    Collection Time: 08/08/22  7:36 PM   Result Value Ref Range    WBC 9.1 4.1 - 11.1 K/uL    RBC 3.20 (L) 4.10 - 5.70 M/uL    HGB 8.9 (L) 12.1 - 17.0 g/dL    HCT 28.3 (L) 36.6 - 50.3 %    MCV 88.4 80.0 - 99.0 FL    MCH 27.8 26.0 - 34.0 PG    MCHC 31.4 30.0 - 36.5 g/dL    RDW 15.0 (H) 11.5 - 14.5 %    PLATELET 327 448 - 170 K/uL    MPV 11.4 8.9 - 12.9 FL    NRBC 0.0 0  WBC    ABSOLUTE NRBC 0.00 0.00 - 0.01 K/uL    NEUTROPHILS 70 32 - 75 %    LYMPHOCYTES 17 12 - 49 %    MONOCYTES 10 5 - 13 %    EOSINOPHILS 2 0 - 7 %    BASOPHILS 1 0 - 1 %    IMMATURE GRANULOCYTES 0 0.0 - 0.5 %    ABS. NEUTROPHILS 6.4 1.8 - 8.0 K/UL    ABS. LYMPHOCYTES 1.5 0.8 - 3.5 K/UL    ABS. MONOCYTES 0.9 0.0 - 1.0 K/UL    ABS. EOSINOPHILS 0.1 0.0 - 0.4 K/UL    ABS. BASOPHILS 0.1 0.0 - 0.1 K/UL    ABS. IMM.  GRANS. 0.0 0.00 - 0.04 K/UL    DF AUTOMATED     METABOLIC PANEL, COMPREHENSIVE    Collection Time: 08/08/22  7:36 PM   Result Value Ref Range Sodium 139 136 - 145 mmol/L    Potassium 5.6 (H) 3.5 - 5.1 mmol/L    Chloride 108 97 - 108 mmol/L    CO2 25 21 - 32 mmol/L    Anion gap 6 5 - 15 mmol/L    Glucose 130 (H) 65 - 100 mg/dL    BUN 59 (H) 6 - 20 MG/DL    Creatinine 2.75 (H) 0.70 - 1.30 MG/DL    BUN/Creatinine ratio 21 (H) 12 - 20      GFR est AA 31 (L) >60 ml/min/1.73m2    GFR est non-AA 25 (L) >60 ml/min/1.73m2    Calcium 7.4 (L) 8.5 - 10.1 MG/DL    Bilirubin, total 0.3 0.2 - 1.0 MG/DL    ALT (SGPT) 20 12 - 78 U/L    AST (SGOT) 20 15 - 37 U/L    Alk.  phosphatase 172 (H) 45 - 117 U/L    Protein, total 6.8 6.4 - 8.2 g/dL    Albumin 0.9 (L) 3.5 - 5.0 g/dL    Globulin 5.9 (H) 2.0 - 4.0 g/dL    A-G Ratio 0.2 (L) 1.1 - 2.2     CBC W/O DIFF    Collection Time: 08/09/22  6:47 AM   Result Value Ref Range    WBC 8.2 4.1 - 11.1 K/uL    RBC 3.20 (L) 4.10 - 5.70 M/uL    HGB 8.9 (L) 12.1 - 17.0 g/dL    HCT 27.8 (L) 36.6 - 50.3 %    MCV 86.9 80.0 - 99.0 FL    MCH 27.8 26.0 - 34.0 PG    MCHC 32.0 30.0 - 36.5 g/dL    RDW 15.0 (H) 11.5 - 14.5 %    PLATELET 337 038 - 825 K/uL    MPV 11.5 8.9 - 12.9 FL    NRBC 0.0 0  WBC    ABSOLUTE NRBC 0.00 0.00 - 3.82 K/uL   METABOLIC PANEL, BASIC    Collection Time: 08/09/22  6:47 AM   Result Value Ref Range    Sodium 140 136 - 145 mmol/L    Potassium 5.5 (H) 3.5 - 5.1 mmol/L    Chloride 110 (H) 97 - 108 mmol/L    CO2 26 21 - 32 mmol/L    Anion gap 4 (L) 5 - 15 mmol/L    Glucose 34 (LL) 65 - 100 mg/dL    BUN 63 (H) 6 - 20 MG/DL    Creatinine 2.88 (H) 0.70 - 1.30 MG/DL    BUN/Creatinine ratio 22 (H) 12 - 20      GFR est AA 29 (L) >60 ml/min/1.73m2    GFR est non-AA 24 (L) >60 ml/min/1.73m2    Calcium 7.7 (L) 8.5 - 10.1 MG/DL   GLUCOSE, POC    Collection Time: 08/09/22  7:42 AM   Result Value Ref Range    Glucose (POC) 36 (LL) 65 - 117 mg/dL    Performed by Bronson GASTON (CON)    GLUCOSE, POC    Collection Time: 08/09/22  8:11 AM   Result Value Ref Range    Glucose (POC) 141 (H) 65 - 117 mg/dL    Performed by Tahira Jaquez Lizz GASTON (CON)         Lab Results   Component Value Date    BUN 63 (H) 08/09/2022     08/09/2022    K 5.5 (H) 08/09/2022     (H) 08/09/2022    CO2 26 08/09/2022       Lab Results   Component Value Date    BUN 63 (H) 08/09/2022    BUN 59 (H) 08/08/2022    K 5.5 (H) 08/09/2022    K 5.6 (H) 08/08/2022       Lab Results   Component Value Date    WBC 8.2 08/09/2022    RBC 3.20 (L) 08/09/2022    HGB 8.9 (L) 08/09/2022    HCT 27.8 (L) 08/09/2022    MCV 86.9 08/09/2022    MCH 27.8 08/09/2022    RDW 15.0 (H) 08/09/2022     08/09/2022       No results found for: PTH, PHOS    Urine dipstick:   No results found for: COLOR, APPRN, SPGRU, REFSG, EARL, PROTU, GLUCU, KETU, BILU, UROU, LANCE, LEUKU, GLUKE, EPSU, BACTU, WBCU, RBCU, CASTS, UCRY    I have reviewed the following: All pertinent labs, microbiology data, radiology imaging for my assessment     ECG- Rev: Yes / No  Xray/CT/US/MRI REV: Yes/ No    Care Plan discussed with:  RN  Chart reviewed. Total time spent with patient:  60  Medications list Personally Reviewed   [x]      Yes     []               No      Thank you for allowing us to participate in the care of this patient. We will follow patient.  Please dont hesitate to call with any questions    Jackie Mcdowell MD  8/9/2022    [facility]

## 2022-08-09 NOTE — PROGRESS NOTES
1020 Bedside and Verbal shift change report given to Anuj Cai (oncoming nurse) by Ondina Fuentes (offgoing nurse). Report included the following information SBAR, Kardex, ED Summary, Procedure Summary, Intake/Output, MAR, Accordion, and Recent Results. 26 Pt arrived to unit Primary Nurse Armaan Page RN and Felicitas RN performed a dual skin assessment on this patient Impairment noted- see wound doc flow sheet    Current Bed:     LISA Preciado    Kavon score is 11     Unable to obtain temp, leo burnham applied. 1800 dialysis @ bedside. 1930 Bedside and Verbal shift change report given to Richie Mills (oncoming nurse) by Anuj Cai (offgoing nurse). Report included the following information SBAR, Kardex, ED Summary, Procedure Summary, Intake/Output, MAR, Accordion, and Recent Results.

## 2022-08-09 NOTE — PROGRESS NOTES
Critical Care Team Update    Pt w/ numerous pressure ulcer injury probing to bone, hypothermic, Pan Cx ordered and will cover for HAP. Nephro w/ plan for IHD today. Remainder of plan as per H/P    Abe Lion MD  Staff [facility]

## 2022-08-09 NOTE — PROCEDURES
Hemodialysis / [phone]    Vitals Pre Post Assessment Pre Post   BP BP: (!) 88/65 (08/09/22 1830)   74/51 LOC Lethargic, responds to voice No change   HR Pulse (Heart Rate): 69 (08/09/22 1830) 119 Lungs Diminished No change   Resp Resp Rate: 13 (08/09/22 1830) 30 Cardiac Steady RR Pt tachycardic   Temp Temp: (!) 95.4 °F (35.2 °C) (08/09/22 1830)   35.7 Skin Warm/Dry No change   Weight    Edema Trace No change   Tele status NSR Tachycardic Pain Pain Intensity 1: 0 (08/08/22 2045) 0     Orders   Duration: Start: 2389 End: 2027 Total: 2 hrs   Dialyzer: Dialyzer/Set Up Inspection: Lucille Nevarez (08/09/22 1830)   K Bath: Dialysate K (mEq/L): 2 (08/09/22 1830)   Ca Bath: Dialysate CA (mEq/L): 2.5 (08/09/22 1830)   Na: Dialysate NA (mEq/L): 138 (08/09/22 1830)   Bicarb: Dialysate HCO3 (mEq/L): 35 (08/09/22 1830)   Target Fluid Removal: Goal/Amount of Fluid to Remove (mL): 1000 mL (08/09/22 1830)     Access   Type & Location: [facility]: Dressing CDI, both lumens aspirate and flush well. Running well at .    Comments:                                        Labs   HBsAg (Antigen) / date: Negative 8/9/2022                                              HBsAb (Antibody) / date: Susceptible 8/9/2022   Source: Epic   Obtained/Reviewed  Critical Results Called HGB   Date Value Ref Range Status   08/09/2022 8.9 (L) 12.1 - 17.0 g/dL Final     Potassium   Date Value Ref Range Status   08/09/2022 5.5 (H) 3.5 - 5.1 mmol/L Final     Calcium   Date Value Ref Range Status   08/09/2022 7.7 (L) 8.5 - 10.1 MG/DL Final     BUN   Date Value Ref Range Status   08/09/2022 63 (H) 6 - 20 MG/DL Final     Creatinine   Date Value Ref Range Status   08/09/2022 2.88 (H) 0.70 - 1.30 MG/DL Final        Meds Given   Name Dose Route   Heparin 1:1000 1.4ml x2 Catheter dwell               Adequacy / Fluid    Total Liters Process: 21.5L   Net Fluid Removed: 160ml      Comments   Time Out Done:   (Time) 8175   Admitting Diagnosis: YADY   Consent obtained/signed: Informed Consent Verified: Yes (08/09/22 1830)   Machine / RO # Machine Number: [id number] (08/09/22 3768)   Primary Nurse Rpt Pre: Behzad Gama RN   Primary Nurse Rpt Post: Jan Hinojosa RN   Pt Education: Unable   Care Plan: HD   Pts outpatient clinic: N/A     Tx Summary   SBAR received from Primary RN. Pt arrived to HD [address]. Consent signed & on file. 1830: Each catheter limb disinfected per p&p, caps removed, hubs disinfected per p&p. Each lumen aspirated for blood return and flushed with Normal Saline per policy. Labs drawn per request/ order. VSS. Dialysis Tx initiated. 1845: Pt BP hypotensive at start of treatment and trending down. Levophed ordered by intensivist.   1900: Primary RN in room, starting Levophed. BP very low (58/31). 1903: BP rechecked after Levophed start, now good 127/93. Levo titration by primary RN. 1930: BP much better on Levophed. Primary RN titrating. 2000: BP trending back down, levo titrated up some. 2015: BP Down to 55/31, pt tachycardic and tachypnic. UF off.  2020: Nephrology paged. 2025: Received call back from Dr. Silvia Walker. Orders received to stop treatment. 20500: Tx ended. VSS. Each dialysis catheter limb disinfected per p&p, all possible blood returned per p&p, and each dialysis hub disinfected per p&p. Each lumen flushed, post dialysis catheter Heparin dwell instilled per order, and caps applied. Bed locked and in the lowest position, call bell and belongings in reach. SBAR given to Primary, TRA. Patient is stable at time of my departure. All Dialysis related medications have been reviewed.

## 2022-08-09 NOTE — H&P
CRITICAL CARE ADMISSION NOTE      Name: Yovani Wilkerson   : 1980   MRN: [id number]   Date: 2022      Reason for ICU Admission: Sepsis     ICU PROBLEM LIST   Sepsis, source unclear, UTI (pseudomonas and acinetobacter baumani) vs pnuemonia (recent klebsiella pneumoniae bactermia)   Recent sputum cx with proteus mirabilis, MDR acinetobacter, MRSA treated with colisin, vanc, tigecylcine  Acute Kidney Injury   Non-Ischemic cardiomyopathy with EF 15-20%  Atrial Fibrillation on apixaban  History of PE on anticoagulation  Pulmonary Hypertension  Recent history of PEA cardiac arrest  Chronic respiratory failure with tracheostomy  Acute on Chronic anemia (Direct Oliver IgG positive)  Multiple Wounds (sacrum, bilateral buttocks, bilateral heels)  Dysphagia  Chronic Hypotension  Type 2 DM  Paraplegia  Suprapubic catheter  Hypothyroid          HISTORY OF PRESENT ILLNESS:   This is a 75-year-old male with known past medical history of multiple chronic issues as listed in problem list, who presented to [facility] ED after being sent from [facility] for possible need for CRRT. He has an acute kidney injury secondary to his sepsis, resulting in creatinine of 2.75. Given his hypotension he was deemed to not be a candidate for IHD, and needed CRRT per nephrology. This service is not available at [facility]. Given his chronic tracheostomy and ventilator dependence he will be admitted to ICU for further workup and care. He is awake and alert, denies any complaints of headache, blurred vision, chset pain, shortness of breath, abdominal pain, nausea, vomiting. He has a colostomy, GJ tube, and suprapubic catheter in place. He is a partial DNR with only medications ( NO CPR, NO SHOCKS) his life vest was also recently discontinued.      24 HOUR EVENTS:     NEUROLOGICAL:    -Awake and alert in no acute distress  -history of paraplegia  Dysphagia with GJ tube in place      PULMONOLOGY:   -Chronic respiratory failure with trach in place ( placed 6/24/2022)  -Vent management  -Chest x-ray with bilateral haziness, likely pleural effusion  -PRN nebs/suctioning  -ABCDEF protocol  CARDIOVASCULAR:   -Atrial fibrillation on eliquis  -Non-ischemic cardiomyopathy with EF 15-20%  -Recent PEA arrest (multiple)   -Pulmonary hypertension with right ventricular dysfunction  -Continue midodrine, entresto, florinef, mexiletine    GASTROINTESTINAL:   -Colostomy care  -Continue questran    RENAL/ELECTROLYTE/FLUIDS:   -CRRT vs HD  -Trend lytes and replete as needed  -Florinef  -Suprapubic catheter care    ENDOCRINE:   -Continue synthroid      HEMATOLOGY/ONCOLOGY:   -Trend H&H and transfuse for less than 7     ID/MICRO:       ANTIBIOTICS TO DATE:    CULTURES TO DATE:      ICU DAILY CHECKLIST     Code Status:Partial (No Shocks, No Compressions) Meds okay  DVT Prophylaxis:Eliquis  T/L/D: Ye, GJ tube, Colostomy, suprapubic catheter, PIV  SUP: None  Diet: Tube Feed  Activity Level:Paraplegia  ABCDEF Bundle/Checklist Completed:Yes  Disposition: Stay in ICU  Multidisciplinary Rounds Completed:  Pending  Patient/Family Updated: Yes    HOSPITAL COURSE/DAILY EVENT LOG       SUBJECTIVE:         Review of Systems:     Review of Systems   Constitutional:  Negative for chills, diaphoresis and fever. HENT:  Negative for congestion, ear pain, hearing loss, nosebleeds, sinus pain and sore throat. Eyes:  Negative for blurred vision, double vision and pain. Respiratory:  Negative for cough, sputum production, shortness of breath and wheezing. Cardiovascular:  Negative for chest pain, palpitations and orthopnea. Gastrointestinal:  Negative for abdominal pain, constipation, heartburn, nausea and vomiting. Genitourinary:  Negative for hematuria. Musculoskeletal:  Negative for falls, joint pain and myalgias. Skin:  Negative for itching and rash. Neurological:  Negative for dizziness, tremors, speech change, focal weakness, weakness and headaches.    Endo/Heme/Allergies: Does not bruise/bleed easily. Psychiatric/Behavioral:  Negative for depression. Past Medical History:      has no past medical history on file. Past Surgical History:      has no past surgical history on file. Home Medications:     Prior to Admission medications    Not on File       Allergies/Social/Family History: Allergies   Allergen Reactions    Tape [Adhesive] Rash      Social History     Tobacco Use    Smoking status: Not on file    Smokeless tobacco: Not on file   Substance Use Topics    Alcohol use: Not on file      No family history on file. OBJECTIVE:     Labs and Data: Reviewed 08/09/22  Medications: Reviewed 08/09/22  Imaging: Reviewed 08/09/22    Physical Exam  Constitutional:       General: He is awake. Appearance: He is underweight. He is ill-appearing. HENT:      Head: Normocephalic and atraumatic. Nose: Nose normal.   Eyes:      General: Lids are normal.      Conjunctiva/sclera: Conjunctivae normal.      Pupils: Pupils are equal, round, and reactive to light. Neck:     Cardiovascular:      Rate and Rhythm: Tachycardia present. Rhythm irregularly irregular. Heart sounds: Normal heart sounds, S1 normal and S2 normal. No murmur heard. No friction rub. No gallop. Pulmonary:      Effort: Tachypnea present. Breath sounds: Normal breath sounds and air entry. Comments: On vent  Abdominal:      General: The ostomy site is clean. Bowel sounds are normal.       Musculoskeletal:      Right lower leg: No edema. Left lower leg: No edema. Skin:     General: Skin is warm and dry. Capillary Refill: Capillary refill takes 2 to 3 seconds. Coloration: Skin is pale. Findings: Wound present. Neurological:      Mental Status: He is alert, oriented to person, place, and time and easily aroused. Mental status is at baseline. GCS: GCS eye subscore is 4. GCS verbal subscore is 5. GCS motor subscore is 6.    Psychiatric: Behavior: Behavior is cooperative. Visit Vitals  BP 90/71   Pulse 91   Temp 98 °F (36.7 °C)   Resp 19   Ht 5' 8\" (1.727 m)   Wt 68.8 kg (151 lb 10.8 oz)   SpO2 100%   BMI 23.06 kg/m²      O2 Device: Ventilator (vent collar ) Temp (24hrs), Av °F (36.7 °C), Min:98 °F (36.7 °C), Max:98 °F (36.7 °C)           Intake/Output:     Intake/Output Summary (Last 24 hours) at 2022 0055  Last data filed at 2022 0045  Gross per 24 hour   Intake 500 ml   Output 600 ml   Net -100 ml       Imaging             CRITICAL CARE DOCUMENTATION  I had a face to face encounter with the patient, reviewed and interpreted patient data including clinical events, labs, images, vital signs, I/O's, and examined patient. I have discussed the case and the plan and management of the patient's care with the consulting services, the bedside nurses and the respiratory therapist.      NOTE OF PERSONAL INVOLVEMENT IN CARE   This patient has a high probability of imminent, clinically significant deterioration, which requires the highest level of preparedness to intervene urgently. I participated in the decision-making and personally managed or directed the management of the following life and organ supporting interventions that required my frequent assessment to treat or prevent imminent deterioration. I personally spent 55 minutes of critical care time. This is time spent at this critically ill patient's bedside actively involved in patient care as well as the coordination of care. This does not include any procedural time which has been billed separately.     Kati Metz [facility]     Critical Care Medicine  [employer]

## 2022-08-09 NOTE — PROGRESS NOTES
Pharmacist Note - Vancomycin Dosing (Hemodialysis Patient)    Consult provided for this 38 y.o. male for indication of HAP. This patient is also on the following antibiotic regimen(s): Zosyn  Patient on vancomycin PTA? NO - noted hx of MDR pseudomonas, MDR klebsiella ,MRSA    Lab Results   Component Value Date/Time    Creatinine 2.88 (H) 2022 06:47 AM    WBC 8.2 2022 06:47 AM    BUN 63 (H) 2022 06:47 AM   Temp (24hrs), Av.1 °F (35.1 °C), Min:91.4 °F (33 °C), Max:98.1 °F (36.7 °C)      Estimated CrCl:  ESRD on HD- trying HD tonight, if not tolerated may switch to CRRT    Cultures:   blood - pending   sputum - pending   urine - pending    MRSA Swab ordered (if applicable)? YES    For this HD patient, will initiate therapy with a loading dose of Vancomycin 1750 mg, to be followed with a dose of 500 mg after each HD session. Dose will be adjusted to maintain a pre-HD trough of approximately 20 - 25 mcg/mL for therapeutic goal of 15 - 20 mcg/mL as approximately 35% of drug will be removed by HD filtration. Pharmacy to follow patient daily and order levels / make dose adjustments as appropriate.

## 2022-08-09 NOTE — ED NOTES
Bedside report given to Matrha Gomez RN. Pt will achieve better quality vision with contacts either [facility] or [employer].

## 2022-08-09 NOTE — ED NOTES
Provider Poonam Walton came to evaluate patient, advised of trending B/P. If MAP decreases less than 65, provide another bolus of N/S. If continued administer norepinephrine. Patient passed [address] Providing diet ginger ale to drink per provider. Patient indicated chest pains by pointing to chest. Completed a EKG resulting in NSR w/ BBB. Called provider Poonam Walton in CCU but currently intubating a patient, RN will pass message along. Will follow up.

## 2022-08-09 NOTE — PROGRESS NOTES
Patient seen in ED 8. Orders placed for admission. Called aura, awaiting fax of patient information.  Spoke with nursing supervisor Brandon Weinstein [facility]     [address] [facility]

## 2022-08-10 LAB
ALBUMIN SERPL-MCNC: 1.2 G/DL (ref 3.5–5)
ANION GAP SERPL CALC-SCNC: 9 MMOL/L (ref 5–15)
BACTERIA SPEC CULT: NORMAL
BUN SERPL-MCNC: 45 MG/DL (ref 6–20)
BUN/CREAT SERPL: 22 (ref 12–20)
CALCIUM SERPL-MCNC: 7.9 MG/DL (ref 8.5–10.1)
CHLORIDE SERPL-SCNC: 105 MMOL/L (ref 97–108)
CO2 SERPL-SCNC: 23 MMOL/L (ref 21–32)
CORTIS AM PEAK SERPL-MCNC: 16.6 UG/DL (ref 4.3–22.45)
CORTIS SERPL-MCNC: 18.3 UG/DL
CREAT SERPL-MCNC: 2.02 MG/DL (ref 0.7–1.3)
ERYTHROCYTE [DISTWIDTH] IN BLOOD BY AUTOMATED COUNT: 15.1 % (ref 11.5–14.5)
FERRITIN SERPL-MCNC: 525 NG/ML (ref 26–388)
GLUCOSE BLD STRIP.AUTO-MCNC: 157 MG/DL (ref 65–117)
GLUCOSE SERPL-MCNC: 151 MG/DL (ref 65–100)
HCT VFR BLD AUTO: 24.9 % (ref 36.6–50.3)
HGB BLD-MCNC: 8.1 G/DL (ref 12.1–17)
IRON SATN MFR SERPL: 32 % (ref 20–50)
IRON SERPL-MCNC: 55 UG/DL (ref 35–150)
MAGNESIUM SERPL-MCNC: 2.3 MG/DL (ref 1.6–2.4)
MCH RBC QN AUTO: 28.1 PG (ref 26–34)
MCHC RBC AUTO-ENTMCNC: 32.5 G/DL (ref 30–36.5)
MCV RBC AUTO: 86.5 FL (ref 80–99)
NRBC # BLD: 0 K/UL (ref 0–0.01)
NRBC BLD-RTO: 0 PER 100 WBC
PHOSPHATE SERPL-MCNC: 3.8 MG/DL (ref 2.6–4.7)
PLATELET # BLD AUTO: 216 K/UL (ref 150–400)
PMV BLD AUTO: 12.2 FL (ref 8.9–12.9)
POTASSIUM SERPL-SCNC: 4.2 MMOL/L (ref 3.5–5.1)
RBC # BLD AUTO: 2.88 M/UL (ref 4.1–5.7)
SERVICE CMNT-IMP: ABNORMAL
SERVICE CMNT-IMP: NORMAL
SODIUM SERPL-SCNC: 137 MMOL/L (ref 136–145)
TIBC SERPL-MCNC: 170 UG/DL (ref 250–450)
VANCOMYCIN SERPL-MCNC: 21.5 UG/ML
WBC # BLD AUTO: 13 K/UL (ref 4.1–11.1)

## 2022-08-10 PROCEDURE — 99221 1ST HOSP IP/OBS SF/LOW 40: CPT | Performed by: NURSE PRACTITIONER

## 2022-08-10 PROCEDURE — 74011250637 HC RX REV CODE- 250/637: Performed by: NURSE PRACTITIONER

## 2022-08-10 PROCEDURE — 80202 ASSAY OF VANCOMYCIN: CPT

## 2022-08-10 PROCEDURE — 82962 GLUCOSE BLOOD TEST: CPT

## 2022-08-10 PROCEDURE — 82533 TOTAL CORTISOL: CPT

## 2022-08-10 PROCEDURE — 2709999900 HC NON-CHARGEABLE SUPPLY

## 2022-08-10 PROCEDURE — 83735 ASSAY OF MAGNESIUM: CPT

## 2022-08-10 PROCEDURE — 83540 ASSAY OF IRON: CPT

## 2022-08-10 PROCEDURE — 82728 ASSAY OF FERRITIN: CPT

## 2022-08-10 PROCEDURE — 36415 COLL VENOUS BLD VENIPUNCTURE: CPT

## 2022-08-10 PROCEDURE — 85027 COMPLETE CBC AUTOMATED: CPT

## 2022-08-10 PROCEDURE — 94003 VENT MGMT INPAT SUBQ DAY: CPT

## 2022-08-10 PROCEDURE — 65620000000 HC RM CCU GENERAL

## 2022-08-10 PROCEDURE — 74011250636 HC RX REV CODE- 250/636: Performed by: STUDENT IN AN ORGANIZED HEALTH CARE EDUCATION/TRAINING PROGRAM

## 2022-08-10 PROCEDURE — 90945 DIALYSIS ONE EVALUATION: CPT

## 2022-08-10 PROCEDURE — 80069 RENAL FUNCTION PANEL: CPT

## 2022-08-10 PROCEDURE — 74011636637 HC RX REV CODE- 636/637: Performed by: NURSE PRACTITIONER

## 2022-08-10 PROCEDURE — 74011000250 HC RX REV CODE- 250: Performed by: INTERNAL MEDICINE

## 2022-08-10 PROCEDURE — 74011000250 HC RX REV CODE- 250: Performed by: NURSE PRACTITIONER

## 2022-08-10 PROCEDURE — 74011000258 HC RX REV CODE- 258: Performed by: STUDENT IN AN ORGANIZED HEALTH CARE EDUCATION/TRAINING PROGRAM

## 2022-08-10 RX ORDER — HYDROMORPHONE HYDROCHLORIDE 1 MG/ML
0.5 INJECTION, SOLUTION INTRAMUSCULAR; INTRAVENOUS; SUBCUTANEOUS ONCE
Status: COMPLETED | OUTPATIENT
Start: 2022-08-10 | End: 2022-08-10

## 2022-08-10 RX ADMIN — MIDODRINE HYDROCHLORIDE 10 MG: 5 TABLET ORAL at 12:12

## 2022-08-10 RX ADMIN — APIXABAN 5 MG: 5 TABLET, FILM COATED ORAL at 04:36

## 2022-08-10 RX ADMIN — CHOLESTYRAMINE 4 G: 4 POWDER, FOR SUSPENSION ORAL at 12:12

## 2022-08-10 RX ADMIN — FLUDROCORTISONE ACETATE 0.1 MG: 0.1 TABLET ORAL at 09:05

## 2022-08-10 RX ADMIN — PIPERACILLIN AND TAZOBACTAM 3.38 G: 3; .375 INJECTION, POWDER, LYOPHILIZED, FOR SOLUTION INTRAVENOUS at 07:00

## 2022-08-10 RX ADMIN — HYDROMORPHONE HYDROCHLORIDE 0.5 MG: 1 INJECTION, SOLUTION INTRAMUSCULAR; INTRAVENOUS; SUBCUTANEOUS at 10:01

## 2022-08-10 RX ADMIN — MINERAL SUPPLEMENT IRON 300 MG / 5 ML STRENGTH LIQUID 100 PER BOX UNFLAVORED 300 MG: at 12:13

## 2022-08-10 RX ADMIN — ATORVASTATIN CALCIUM 40 MG: 40 TABLET, FILM COATED ORAL at 22:07

## 2022-08-10 RX ADMIN — MIDODRINE HYDROCHLORIDE 10 MG: 5 TABLET ORAL at 16:11

## 2022-08-10 RX ADMIN — CHLORHEXIDINE GLUCONATE 15 ML: 1.2 RINSE ORAL at 09:05

## 2022-08-10 RX ADMIN — CHLORHEXIDINE GLUCONATE 15 ML: 1.2 RINSE ORAL at 22:02

## 2022-08-10 RX ADMIN — Medication 5 UNITS: at 09:00

## 2022-08-10 RX ADMIN — APIXABAN 5 MG: 5 TABLET, FILM COATED ORAL at 16:11

## 2022-08-10 RX ADMIN — PIPERACILLIN AND TAZOBACTAM 3.38 G: 3; .375 INJECTION, POWDER, LYOPHILIZED, FOR SOLUTION INTRAVENOUS at 23:27

## 2022-08-10 RX ADMIN — VANCOMYCIN HYDROCHLORIDE 1000 MG: 1 INJECTION, POWDER, LYOPHILIZED, FOR SOLUTION INTRAVENOUS at 16:12

## 2022-08-10 RX ADMIN — MINERAL SUPPLEMENT IRON 300 MG / 5 ML STRENGTH LIQUID 100 PER BOX UNFLAVORED 300 MG: at 09:05

## 2022-08-10 RX ADMIN — MEXILETINE HYDROCHLORIDE 150 MG: 150 CAPSULE ORAL at 16:11

## 2022-08-10 RX ADMIN — CALCIUM CHLORIDE, MAGNESIUM CHLORIDE, DEXTROSE MONOHYDRATE, LACTIC ACID, SODIUM CHLORIDE, SODIUM BICARBONATE AND POTASSIUM CHLORIDE: 5.15; 2.03; 22; 5.4; 6.46; 3.09; .157 INJECTION INTRAVENOUS at 12:54

## 2022-08-10 RX ADMIN — MIDODRINE HYDROCHLORIDE 10 MG: 5 TABLET ORAL at 09:05

## 2022-08-10 RX ADMIN — MINERAL SUPPLEMENT IRON 300 MG / 5 ML STRENGTH LIQUID 100 PER BOX UNFLAVORED 300 MG: at 16:11

## 2022-08-10 RX ADMIN — CHOLESTYRAMINE 4 G: 4 POWDER, FOR SUSPENSION ORAL at 09:04

## 2022-08-10 RX ADMIN — CHOLESTYRAMINE 4 G: 4 POWDER, FOR SUSPENSION ORAL at 17:00

## 2022-08-10 RX ADMIN — MEXILETINE HYDROCHLORIDE 150 MG: 150 CAPSULE ORAL at 04:00

## 2022-08-10 RX ADMIN — PIPERACILLIN AND TAZOBACTAM 3.38 G: 3; .375 INJECTION, POWDER, LYOPHILIZED, FOR SOLUTION INTRAVENOUS at 16:11

## 2022-08-10 RX ADMIN — Medication 10 ML: at 22:00

## 2022-08-10 RX ADMIN — LEVOTHYROXINE SODIUM 50 MCG: 0.05 TABLET ORAL at 04:37

## 2022-08-10 RX ADMIN — Medication 10 ML: at 14:18

## 2022-08-10 NOTE — PROGRESS NOTES
Day #2 of Vancomycin  Indication:  HAP  Current regimen:  1750mg x1  @ 1724  Abx regimen:  vanc + zosyn  ID Following ?: NO  Concomitant nephrotoxic drugs (requires more frequent monitoring): Vasopressors  Frequency of BMP?: every 6 hours    Recent Labs     08/10/22  0443 08/10/22  0439 22  0647 22  1936   WBC 13.0*  --  8.2 9.1   CREA  --  2.02* 2.88* 2.75*   BUN  --  45* 63* 59*     Est CrCl: CVVH  Temp (24hrs), Av.7 °F (35.4 °C), Min:91.4 °F (33 °C), Max:98.1 °F (36.7 °C)    Cultures:    blood - NGTD   sputum - NGTD   urine - pending    MRSA Swab ordered (if applicable)? YES - ordered, not yet collected    Goal target range: trough 15 mcg/mL    Recent level history:  Date/Time Dose & Interval Measured Level (mcg/mL) Associated AUC/KATIE Dose Adjustment    8/10 @ 443 1750mg x1  @1724 21.5 mcg/mL (~12 hrs p dose) NA 1000mg q24h                                           Plan: Vancomycin level ~12 hours after loading dose this morning is still above goal trough of ~15 mcg/L.  Will order a maintenance dose of 1000mg q24h to start this evening around the 24 hour jorge from loading dose when vanc concentration expected to be around goal.

## 2022-08-10 NOTE — DIALYSIS
CRRT / [phone]           Orders   Mode: CVVH Started @ 2315   Blood Flow Rate: 200 ml/min   Prismasol Dose: 1,500 ml/hr  PBP: 750 ml/hr  Dialysate: 750 ml/hr   Prismasol Concentrate: 2K / 3.5Ca   Blood Warmer Temp: 37*C   Net Fluid Removal: 0 ml/hr            Metrics   BP: 126/84   HR: 108   Access Pressure: -55   Filter Pressure: 116   Return Pressure: 61   TMP: 47   Pressure Drop: 24            Access   Type & Location: RIJ tunneled CVC: tegaderm dressing with CHG patch C/D/I, dated 8/8/22. Each catheter limb disinfected for 60 seconds per limb with alcohol swabs and hubs scrubbed with alcohol for 30 sec seconds, followed by 5 second dry time per Hospital P&P. +asp/+flush x 2 ports. Labs   HBsAg (Antigen) / date: Negative 8/9/22                 HBsAb (Antibody) / date: Susceptible 8/9/22   Source: [facility]            Safety:   Time Out Done:   8267   Consent obtained/signed: Obtained   Education: CVC care / Infection prevention / Plan   Primary Nurse Rpt: ASHLEIGH Keating RN      Comments / Plan:   Patient, code status, labs, notes and orders reviewed. Time out complete. In at bedside to initiate CVVH per MD orders. New machine & HF-1000 filter set up, primed with 1L NS, tested and running well. Lines visible and connections secure with blood warmer supported on return line and set at 37*C. Heparin infusing pre-filter @ 500 units/hr. Education & pre/post to pt & primary RN.

## 2022-08-10 NOTE — DIALYSIS
CRRT / [phone]           Orders   Mode: CVVH rounding   Blood Flow Rate: 200 ml/min   Prismasol Dose: 1,500 ml/hr  PBP: 750 ml/hr  Dialysate: 750 ml/hr   Prismasol Concentrate: 2K / 3.5Ca   Blood Warmer Temp: 37*C   Net Fluid Removal: 0 ml/hr            Metrics   BP: 121/84   HR: 93   Access Pressure: -86   Filter Pressure: 170   Return Pressure: 65   TMP: 66   Pressure Drop: 73            Access   Type & Location: RIJ tunneled CVC: Dressing changed per hospital policy, dated 67/70/97. Sutures are pulling tissue slightly, monitor closely, avoid excessive pulling on sutures. Labs   HBsAg (Antigen) / date: Negative 8/9/22                 HBsAb (Antibody) / date: Susceptible 8/9/22   Source: [employer]            Safety:   Time Out Done:   6473   Consent obtained/signed: Verified   Education: CVC care    Primary Nurse Rpt: José Miguel Oliver RN      Comments / Plan:   Patient, code status, labs, notes and orders reviewed. Time out complete. CRRT running well at this time, no indication for change. Lines are visible and connections secure with blood warmer supported on return line and set at 37*C. Heparin infusing pre-filter @ 500 units/hr. Education & pre/post to pt & primary RN.

## 2022-08-10 NOTE — CONSULTS
[facility]  Inpatient Consultation        Admit Date: 8/8/2022  Reason for Consultation: decubitus ulcers    HPI:  Ace Collins is a 38 y.o. male w/ hx of paraplegia, cardiomyopathy, trached on vent; has PEG and multiple areas of skin breakdown whom we are asked to see in consultation by KERON Rashid for the above complaint. Pt presented from [address] [place] [address] w/ renal failure, hypotension needed CRRT. He has multiple areas of skin breakdown and pictures are provided in wound care note today. We are asked to see sacrum, RLE, and L hip. He is septic - has elevated WBC, in hypothermic. Patient Active Problem List    Diagnosis Date Noted    YADY (acute kidney injury) ([address] 08/09/2022     No past medical history on file. No past surgical history on file. Social History     Tobacco Use    Smoking status: Not on file    Smokeless tobacco: Not on file   Substance Use Topics    Alcohol use: Not on file      No family history on file. Prior to Admission medications    Medication Sig Start Date End Date Taking? Authorizing Provider   atorvastatin (LIPITOR) 40 mg tablet Take 40 mg by mouth nightly. PTA Med List from [address] [place] [address] 8/8/22   Yes Provider, Historical   albuterol (PROVENTIL VENTOLIN) 2.5 mg /3 mL (0.083 %) nebu 2.5 mg by Nebulization route three (3) times daily as needed for Wheezing. PTA Med List from [address] [place] [address] 8/8/22   Yes Provider, Historical   albuterol (PROVENTIL VENTOLIN) 2.5 mg /3 mL (0.083 %) nebu 2.5 mg by Nebulization route two (2) times a day. PTA Med List from [address] [place] [address] 8/8/22   Yes Provider, Historical   sacubitriL-valsartan Denyce Kubas) 24-26 mg tablet Take 0.5 Tablets by mouth two (2) times a day. PTA Med List from [address] [place] [address] 8/8/22   Yes Provider, Historical   apixaban (Eliquis) 5 mg tablet Take 5 mg by mouth two (2) times a day. PTA Med List from [address] [place] [address] 8/8/22   Yes Provider, Historical   ferrous sulfate 300 mg (60 mg iron)/5 mL syrup Take 300 mg by mouth in the morning.  PTA Med List from [address] [place] [address] 8/8/22   Yes Provider, Historical   fludrocortisone (FLORINEF) 0.1 mg tablet Take 0.1 mg by mouth in the morning. PTA Med List from [address] [place] [address] 8/8/22   Yes Provider, Historical   sodium chloride (Hyper-SaL) 7 % nebulizer solution 4 mL by Nebulization route two (2) times a day. PTA Med List from [address] [place] [address] 8/8/22   Yes Provider, Historical   insulin glargine (Lantus U-100 Insulin) 100 unit/mL injection 5 Units by SubCUTAneous route nightly. PTA Med List from [address] [place] [address] 8/8/22   Yes Provider, Historical   melatonin 3 mg tablet Take 3 mg by mouth nightly as needed for Insomnia. PTA Med List from [address] [place] [address] 8/8/22   Yes Provider, Historical   mexiletine (MEXITIL) 150 mg capsule Take 150 mg by mouth two (2) times a day. PTA Med List from [address] [place] [address] 8/8/22   Yes Provider, Historical   midodrine (PROAMATINE) 5 mg tablet Take 10 mg by mouth three (3) times daily. PTA Med List from [address] [place] [address] 8/8/22   Yes Provider, Historical   cholestyramine-aspartame (QUESTRAN LIGHT) 4 gram packet Take 4 g by mouth two (2) times a day. PTA Med List from [address] [place] [address] 8/8/22   Yes Provider, Historical   glycopyrrolate (RobinuL) 1 mg tablet Take 1 mg by mouth every eight (8) hours as needed. PTA Med List from [address] [place] [address] 8/8/22   Yes Provider, Historical   levothyroxine (synthroid) 50 mcg tablet Take 50 mcg by mouth Daily (before breakfast). PTA Med List from [address] [place] [address] 8/8/22   Yes Provider, Historical   HYDROcodone-acetaminophen (NORCO) 5-325 mg per tablet Take 1 Tablet by mouth every four (4) hours as needed for Pain. PTA Med List from [address] [place] [address] 8/8/22   Yes Provider, Historical   ascorbic acid, vitamin C, (VITAMIN C) 500 mg tablet Take 500 mg by mouth nightly. PTA Med List from [address] [place] [address] 8/8/22   Yes Provider, Historical   cholecalciferol (VITAMIN D3) (1000 Units /25 mcg) tablet Take 1,000 Units by mouth in the morning.  PTA Med List from [address] [place] [address] 8/8/22   Yes Provider, Historical   ALPRAZolam (Xanax) 0.25 mg tablet Take 0.25 mg by mouth every eight (8) hours as needed for Anxiety. PTA Med List from [address] [place] [address] 8/8/22   Yes Provider, Historical   zinc sulfate (ZINCATE) 50 mg zinc (220 mg) capsule Take 1 Capsule by mouth in the morning. PTA Med List from [address] [place] [address] 8/8/22   Yes Provider, Historical   ondansetron (ZOFRAN) 4 mg/2 mL soln 4 mg by IntraVENous route every four (4) hours as needed for Nausea. PTA Med List from [address] [place] [address] 8/8/22   Yes Provider, Historical   insulin regular (HumuLIN R Regular U-100 Insuln) 100 unit/mL injection by SubCUTAneous route every six (6) hours as needed. PTA Med List from [address] [place] [address] 8/8/22   Yes Provider, Historical   TIGEcycline 50 mg IVPB 50 mg by IntraVENous route every twelve (12) hours.  PTA Med List from [address] [place] [address] 8/8/22   Yes Provider, Historical     Current Facility-Administered Medications   Medication Dose Route Frequency    vancomycin (VANCOCIN) 1,000 mg in 0.9% sodium chloride 250 mL ([id number])  1,000 mg IntraVENous Q24H    alteplase (CATHFLO) 1 mg in sterile water (preservative free) 1 mL injection  1 mg InterCATHeter PRN    apixaban (ELIQUIS) tablet 5 mg  5 mg Oral Q12H    atorvastatin (LIPITOR) tablet 40 mg  40 mg Oral QHS    fludrocortisone (FLORINEF) tablet 0.1 mg  0.1 mg Oral DAILY    insulin glargine (LANTUS) injection 5 Units  5 Units SubCUTAneous DAILY    [Held by provider] sacubitriL-valsartan (ENTRESTO) 24-26 mg tablet 0.5 Tablet  0.5 Tablet Oral Q12H    ferrous sulfate 300 mg (60 mg iron)/5 mL oral syrup 300 mg  300 mg Oral TID WITH MEALS    mexiletine (MEXITIL) capsule 150 mg  150 mg Oral Q12H    midodrine (PROAMATINE) tablet 10 mg  10 mg Oral TID WITH MEALS    cholestyramine-aspartame (QUESTRAN LIGHT) packet 4 g  4 g Oral TID WITH MEALS    levothyroxine (SYNTHROID) tablet 50 mcg  50 mcg Oral 6am    albuterol-ipratropium (DUO-NEB) 2.5 MG-0.5 MG/3 ML  3 mL Nebulization Q4H PRN    heparin (porcine) 1,000 unit/mL injection 1,400 Units  1,400 Units InterCATHeter DIALYSIS PRN    And    heparin (porcine) 1,000 unit/mL injection 1,400 Units  1,400 Units InterCATHeter DIALYSIS PRN    Vancomycin - pharmacy to dose   Other Rx Dosing/Monitoring    NOREPINephrine (LEVOPHED) 8 mg in 5% dextrose 250mL (32 mcg/mL) infusion  0.5-30 mcg/min IntraVENous TITRATE    bicarbonate dialysis (PRISMASOL) BG K 2/Ca 3.5 5000 ml solution   Extracorporeal DIALYSIS CONTINUOUS    piperacillin-tazobactam (ZOSYN) 3.375 g in 0.9% sodium chloride (MBP/ADV) 100 mL MBP  3.375 g IntraVENous Q8H    heparin 25,000 units in D5W 250 ml infusion  500 Units/hr IntraVENous CONTINUOUS    sodium chloride (NS) flush 5-40 mL  5-40 mL IntraVENous Q8H    sodium chloride (NS) flush 5-40 mL  5-40 mL IntraVENous PRN    acetaminophen (TYLENOL) tablet 650 mg  650 mg Oral Q6H PRN    Or    acetaminophen (TYLENOL) suppository 650 mg  650 mg Rectal Q6H PRN    polyethylene glycol (MIRALAX) packet 17 g  17 g Oral DAILY PRN    ondansetron (ZOFRAN ODT) tablet 4 mg  4 mg Oral Q8H PRN    Or    ondansetron (ZOFRAN) injection 4 mg  4 mg IntraVENous Q6H PRN    chlorhexidine (PERIDEX) 0.12 % mouthwash 15 mL  15 mL Oral Q12H     Allergies   Allergen Reactions    Tape [Adhesive] Rash          Subjective:     Review of Systems:    A comprehensive review of systems was negative except for that written in the History of Present Illness. Objective:     Blood pressure 121/84, pulse 93, temperature (!) 95.9 °F (35.5 °C), resp. rate 16, height 5' 8\" (1.727 m), weight 150 lb 12.7 oz (68.4 kg), SpO2 100 %.   Temp (24hrs), Av.7 °F (35.4 °C), Min:91.4 °F (33 °C), Max:98.1 °F (36.7 °C)      Recent Labs     08/10/22  0443 22  0647 22  1936   WBC 13.0* 8.2 9.1   HGB 8.1* 8.9* 8.9*   HCT 24.9* 27.8* 28.3*    198 242     Recent Labs     08/10/22  0439 22  2102 22  0647 22  1936     --  140 139   K 4.2  --  5.5* 5.6*     --  110* 108   CO2 23  --  26 25   *  --  34* 130*   BUN 45*  --  63* 59*   CREA 2.02*  --  2.88* 2.75*   CA 7.9*  --  7.7* 7.4*   MG 2.3 2.2  --   --    PHOS 3.8 --   --   --    ALB 1.2*  --   --  0.9*   TBILI  --   --   --  0.3   ALT  --   --   --  20     No results for input(s): AML, LPSE in the last 72 hours. Intake/Output Summary (Last 24 hours) at 8/10/2022 1315  Last data filed at 8/10/2022 1200  Gross per 24 hour   Intake 1750.47 ml   Output 775 ml   Net 975.47 ml     Date 08/10/22 0700 - 08/11/22 0659   Shift 3669-6964 9397-9779 3490-5534 24 Hour Total   INTAKE   I.V.(mL/kg/hr) 127.5   127.5   Shift Total(mL/kg) 127.5(1.9)   127.5(1.9)   OUTPUT   Urine(mL/kg/hr) 25   25   Dialysis 232   232   Shift Total(mL/kg) 257(3.8)   257(3.8)   Weight (kg) 68.4 68.4 68.4 68.4     _____________________  Physical Exam:     General:  Alert, cooperative, trached on vent   Eyes:   Sclera clear. Throat: Lips, mucosa, and tongue normal.   Neck: Supple, symmetrical, +trach   Lungs:   Clear to auscultation bilaterally. Heart:   Irreg. Abdomen:   + G/J tube   Extremities: Extremities normal, atraumatic, no cyanosis or edema. Skin: Sacral wound, R lateral lower leg, and L hip have necrotic tissue present, also has pink tissue. Assessment:   Active Problems:    YADY (acute kidney injury) ([facility] [address] (8/9/2022)            Plan:     Would cont local wound care  With heart failure an operation puts him at risk  Will ask Dr Esteban Henning to see    Thank you for allowing us to participate in the care of this patient. Total time spent with patient: 30 minutes. Signed By: Brian Valencia NP     August 10, 2022      ADDENDUM:  Anastasiya Posada MD  Pt was seen and examined. Will plan bedside debridement. Continue antibiotic therapy and local wound care.

## 2022-08-10 NOTE — PROGRESS NOTES
2015: Patient restless, tachycardic, hypotensive. Levophed increased see MAR. Dr. Jennifer Gordillo paged Dialysis terminated. Plan for CVVH.   2300: CVVH started. 0100: Patient tolerating CVVH.   0730: Bedside and Verbal shift change report given to  (oncoming nurse) by Biju Davila (offgoing nurse). Report included the following information SBAR, Kardex, MAR, Recent Results, Med Rec Status, Cardiac Rhythm  , and Alarm Parameters .

## 2022-08-10 NOTE — PROGRESS NOTES
Name: Jose Manuel Stack MRN: [id number]   : 1980 [facility]: [facility]   Date: 8/10/2022        IMPRESSION:   YADY, started on iHD initially. Last night patient became hypotensive and was switched to CRT. Debility with multiple pressure ulcers  Respiratory failure - on vent  Anemia of chronic disease. Hyperkalemia - resolved with RRT  Protein deficiency      PLAN:   Continue CRT for now. Watch for GFR recovery. Prior to starting dialysis patient's Scr was 0.8  Anemia management. Poor overall prognosis. Subjective/Interval History:   I have reviewed the flowsheet and previous days notes. ROS:Pertinent items are noted in HPI. Not required    Objective:   Vital Signs:    Visit Vitals  /79   Pulse 99   Temp (!) 95.9 °F (35.5 °C)   Resp 16   Ht 5' 8\" (1.727 m)   Wt 68.4 kg (150 lb 12.7 oz)   SpO2 100%   BMI 22.93 kg/m²       O2 Device: Tracheostomy       Temp (24hrs), Av.7 °F (35.4 °C), Min:91.4 °F (33 °C), Max:98.1 °F (36.7 °C)       Intake/Output:   Last shift:      08/10 07 - 08/10 1900  In: -   Out: 171 [Urine:25]  Last 3 shifts: 1901 - 08/10 07  In: 2250.5 [I.V.:1850.5]  Out: 8259 [Urine:1150]    Intake/Output Summary (Last 24 hours) at 8/10/2022 1109  Last data filed at 8/10/2022 1009  Gross per 24 hour   Intake 1750.47 ml   Output 689 ml   Net 1061.47 ml        Physical Exam:  General:    Alert, awake, nods his head. Head:   Normocephalic, without obvious abnormality, atraumatic. Bitemporal muscle waisting. Eyes:   Conjunctivae/corneas clear. Nose:  Nares normal. No drainage or sinus tenderness. Throat:    Lips, mucosa, and tongue normal.  No  Neck:  Trach  Back:    Symmetric,  No CVA tenderness. Lungs:   Clear to auscultation bilaterally. No Wheezing or Rhonchi. No rales. Chest wall:  No tenderness or deformity. No Accessory muscle use. Heart:   Regular rate and rhythm,  no murmur, rub or gallop. Abdomen:   Soft, non-tender. Not distended.   Bowel sounds normal. No masses  Extremities: Severe muscle waisting. No edema. Skin:     Texture, turgor normal. No rashes or lesions. Not Jaundiced  Psych:  Not anxious or agitated. Neurologic: Dense LE paraplegia. DATA:  Labs:  Recent Labs     08/10/22  0439 08/09/22 2102 08/09/22 0647 08/08/22 1936     --  140 139   K 4.2  --  5.5* 5.6*     --  110* 108   CO2 23  --  26 25   BUN 45*  --  63* 59*   CREA 2.02*  --  2.88* 2.75*   CA 7.9*  --  7.7* 7.4*   ALB 1.2*  --   --  0.9*   PHOS 3.8  --   --   --    MG 2.3 2.2  --   --      Recent Labs     08/10/22  0443 08/09/22  0647 08/08/22  1936   WBC 13.0* 8.2 9.1   HGB 8.1* 8.9* 8.9*   HCT 24.9* 27.8* 28.3*    198 242     No results for input(s): YAMIL, KU, CLU, CREAU in the last 72 hours.     No lab exists for component: PROU    Total time spent with patient:  35 minutes    [] Critical Care Provided    Care Plan discussed with:   Staff, Medical Team    Azar Donaldson MD

## 2022-08-10 NOTE — PROGRESS NOTES
08/10/22 1552   Airway - Tracheostomy Tube Gibran   No placement date or time found. Present on Admission/Arrival: Yes  Airway Types: Tracheostomy  Trach Tube Type: Shiley  Cuff Type: Air  Airway Tube Size: 6 mm   Inner Cannula #6 Gibran; Cuffed, cuff inflated   Side Secured Centered;Device   Patient consistently pulls trach out to the tip of stoma. MD and RN aware. Patient respiratory vitals are within acceptable limits at this time.

## 2022-08-10 NOTE — PROGRESS NOTES
CRITICAL CARE NOTE      Name: Ashish Ponce   : 1980   MRN: [id number]   Date: 8/10/2022      REASON FOR ICU ADMISSION:  Acute renal failure, chronic vent dependence, septic shock     PRINCIPAL ICU DIAGNOSIS   Septic shock, unclear etiology  Acute renal injury  Chronic hypoxic respiratory failure, s/p trach, vent dependent  Chronic decubitus pressure injury ulcers, present on admission  HFrEF (20-25%)  Afib  Chronic hypotension  Paraplegia    BRIEF PATIENT SUMMARY   76-year-old male with known past medical history of multiple chronic issues as listed in problem list, who presented to [facility] ED after being sent from [facility] for possible need for CRRT for YADY due to inability to tolerate iHD due to sepsis/septic shock from unclear source. COMPREHENSIVE ASSESSMENT & PLAN:SYSTEM BASED     24 HOUR EVENTS:   Did not tolerated iHD, started on levophed and CRRT.  No acute overnight events    NEUROLOGICAL:   -Awake and alert in no acute distress  -history of paraplegia  Dysphagia with GJ tube in place    PULMONOLOGY:   -Chronic respiratory failure with trach in place ( placed 2022)  -Vent management  -Chest x-ray with bilateral haziness, likely pleural effusion  -PRN nebs/suctioning  -ABCDEF protocol    CARDIOVASCULAR:   - wean levophed as tolerated  - Atrial fibrillation on eliquis  -Non-ischemic cardiomyopathy with EF 15-20%  -Recent PEA arrest (multiple)  -Pulmonary hypertension with right ventricular dysfunction  -Continue midodrine, entresto, florinef, mexiletine  - holding entresto due to hypotension    GASTROINTESTINAL   -Colostomy care  -Continue questran    RENAL/ELECTROLYTE/FLUIDS:   - CRRT per nephology, appreciate assistance   -Trend lytes and replete as needed  -Florinef  -Suprapubic catheter care    ENDOCRINE:   -Continue synthroid    HEMATOLOGY/ONCOLOGY:   - No signs of acute bleeding  - on eliquis    INFECTIOUS DISEASE:   Continue Vanc/Zosyn  Pending Cx results    Unclear source, however pt high risk for HAP, CAUTI at time of admission  Wound care eval, chronic ulcers do not appear grossly infected however cannot r/o potential nidus      ICU DAILY CHECKLIST     Code Status:Partial (No Shocks, No Compressions) Meds okay  DVT Prophylaxis:Eliquis  T/L/D: Ye, GJ tube, Colostomy, suprapubic catheter, PIV  SUP: None  Diet: Tube Feed  Activity Level:Paraplegia  ABCDEF Bundle/Checklist Completed:Yes  Disposition: Stay in ICU  Multidisciplinary Rounds Completed:  Pending  Patient/Family Updated: Yes       HOSPITAL COURSE/DAILY EVENT LOG   8/ pan Cx started on Vanc/Zosyn  8/9 started n CRRT levophed    SUBJECTIVE   Review of Systems   Constitutional:  Negative for chills, diaphoresis and fever. HENT:  Negative for congestion, ear pain, hearing loss, nosebleeds, sinus pain and sore throat. Eyes:  Negative for blurred vision, double vision and pain. Respiratory:  Negative for cough, sputum production, shortness of breath and wheezing. Cardiovascular:  Negative for chest pain, palpitations and orthopnea. Gastrointestinal:  Negative for abdominal pain, constipation, heartburn, nausea and vomiting. Genitourinary:  Negative for hematuria. Musculoskeletal:  Negative for falls, joint pain and myalgias. Skin:  Negative for itching and rash. Neurological:  Negative for dizziness, tremors, speech change, focal weakness, weakness and headaches. Endo/Heme/Allergies:  Does not bruise/bleed easily. Psychiatric/Behavioral:  Negative for depression. OBJECTIVE     Labs and Data: Reviewed 08/10/22  Medications: Reviewed 08/10/22  Imaging: Reviewed 08/10/22    Physical Exam  Constitutional:       General: He is awake. Appearance: He is underweight. He is ill-appearing. HENT:      Head: Normocephalic and atraumatic. Nose: Nose normal.   Eyes:      General: Lids are normal.      Conjunctiva/sclera: Conjunctivae normal.      Pupils: Pupils are equal, round, and reactive to light.    Neck: Cardiovascular:      Rate and Rhythm: Tachycardia present. Rhythm irregularly irregular. Heart sounds: Normal heart sounds, S1 normal and S2 normal. No murmur heard. No friction rub. No gallop. Pulmonary:      Effort: Tachypnea present. Breath sounds: Normal breath sounds and air entry. Comments: On vent  Abdominal:      General: The ostomy site is clean. Bowel sounds are normal.       Musculoskeletal:      Right lower leg: No edema. Left lower leg: No edema. Comments: Chronic numerous pressure ulcer wounds throughout, present on admission see wound care note for further details   Skin:     General: Skin is warm and dry. Capillary Refill: Capillary refill takes 2 to 3 seconds. Coloration: Skin is pale. Findings: Wound present. Neurological:      Mental Status: He is alert, oriented to person, place, and time and easily aroused. Mental status is at baseline. GCS: GCS eye subscore is 4. GCS verbal subscore is 5. GCS motor subscore is 6. Psychiatric:         Behavior: Behavior is cooperative. Visit Vitals  /79   Pulse 99   Temp (!) 95.9 °F (35.5 °C)   Resp 16   Ht 5' 8\" (1.727 m)   Wt 68.4 kg (150 lb 12.7 oz)   SpO2 100%   BMI 22.93 kg/m²      O2 Device: Tracheostomy Temp (24hrs), Av.7 °F (35.4 °C), Min:91.4 °F (33 °C), Max:98.1 °F (36.7 °C)           Intake/Output:     Intake/Output Summary (Last 24 hours) at 8/10/2022 1031  Last data filed at 8/10/2022 1009  Gross per 24 hour   Intake 1750.47 ml   Output 689 ml   Net 1061.47 ml       Imaging       Pertinent imaging reviewed and interpreted independently as noted above    CRITICAL CARE DOCUMENTATION  I had a face to face encounter with the patient, reviewed and interpreted patient data including clinical events, labs, images, vital signs, I/O's, and examined patient.   I have discussed the case and the plan and management of the patient's care with the consulting services, the bedside nurses and the respiratory therapist.      NOTE OF PERSONAL INVOLVEMENT IN CARE   This patient has a high probability of imminent, clinically significant deterioration, which requires the highest level of preparedness to intervene urgently. I participated in the decision-making and personally managed or directed the management of the following life and organ supporting interventions that required my frequent assessment to treat or prevent imminent deterioration. I personally spent 45 minutes of critical care time. This is time spent at this critically ill patient's bedside actively involved in patient care as well as the coordination of care. This does not include any procedural time which has been billed separately. Abe Dong MD  Staff [address] [facility]

## 2022-08-10 NOTE — PROGRESS NOTES
JADEN: Anticipate discharge back to [facility] pending medical progress. Transportation likely ALS with [facility]. Reason for Admission: YADY                     RUR Score:   15%               PCP: First and Last name:   Felicitas Pacheco MD     Name of Practice:    Are you a current patient: Yes/No:    Approximate date of last visit:    Can you participate in a virtual visit if needed:     Do you (patient/family) have any concerns for transition/discharge?  unknown                 Plan for utilizing home health:   N/A    Current Advanced Directive/Advance Care Plan:  Partial Code      Healthcare Decision Maker:   Click here to complete [address] including selection of the Healthcare Decision Maker Relationship (ie \"Primary\")            brother Ramirez, [phone] or [phone]    Transition of Care Plan:   CM completed chart review in order to complete assessment. Patient has a trach and is on a vent. Per nursing staff, communicates appropriately by nodding head yes and no. Patient is alert and oriented. Patient was [facility] from [facility]. CM has left a message for Cathy Jacobson requesting a return call. Return referral submitted in [address] Hx: paraplegia, skin ulcers, afib, chronic sepsis, cardiac arrest, CVA, chronic loya, tracheostomy ventilator, gtube, pulmonary embolism, systolic CHF, HTN, HLD, diabetes type II. Patient currently receiving CRRT. CM left messages for patient's brother and sister. Brother is listed as primary contact. CM will continue to follow for discharge needs.     Care Management Interventions  PCP Verified by CM: No  Mode of Transport at Discharge: ALS  Transition of Care Consult (CM Consult): LTAC (from [facility])  MyChart Signup: No  Discharge Durable Medical Equipment: No  Physical Therapy Consult: No  Occupational Therapy Consult: No  Speech Therapy Consult: No  Support Systems: Other Family Member(s)  Confirm Follow Up Transport: Other (see comment) (ALS w/[facility])  The Plan for Transition of Care is Related to the Following Treatment Goals : [facility]  Discharge Location  Patient Expects to be Discharged to[de-identified] [address] [address])     Iqra Marti, [address] [place] [address],[address]  [phone]

## 2022-08-10 NOTE — PROGRESS NOTES
Mr Laine Millan became hypotensive with IHD this evening and required pressor support. The MAP was below 60. IHD was discontinued. Orders were entered for CVVH  (starting tonight). Will start with a fluid removal factor of   \" Zero \" initially.     D/W [facility] team    D/W CCU Nursing

## 2022-08-10 NOTE — PROGRESS NOTES
Comprehensive Nutrition Assessment    Type and Reason for Visit: Initial (TF)    Nutrition Recommendations/Plan:     TF REC's:     - Continue with Nepro at 40 ml/hr - Hold for 1 hour before Synthroid and 2 hours after     - Add 2 packs ProSource daily     - Flush with 100 ml h20 q4h         Malnutrition Assessment:  Malnutrition Status:  No malnutrition (08/10/22 9505)         Nutrition Assessment:    39 yo male admitted for YADY. PMHx: chronic respiratory failure with trach, dysphagia, PEG-J, cardiomyopathy with EF 15-2-%, DM, paraplegia, hypothyroid, colostomy. Sent from [facility] for CRRT secondary to hypotension. Pt admitted to CCU, placed on pressor support: currently with Levophed at 8 mcg/min. CRRT started 8/9. TF ordered as Nepro at 40 ml/hr + 100 ml h20 flush q4h. Receiving Synthroid so TF will run x 21h. This is providing 840 ml, 1512 kcals (90% kcal needs), 68 gm protein (77% protein needs), 1210 ml water. Will recommend continuing with current TF rate but adding 2 scoops ProSource daily to provide a total of 1632 kcals (97% kcal needs), 98 gm protein (100% protein needs), 1210 ml water. Labs reviewed. WOCN consulted for multiple PI's. No weight hx available to assess changes. Nutritionally Significant Medications:  Levo, Questran, FeSu, Synthroid, Midodrine      Estimated Daily Nutrient Needs:  Energy Requirements Based On: Formula  Weight Used for Energy Requirements: Current  Energy (kcal/day): 1680 ([facility] 2003b)  Weight Used for Protein Requirements: Current  Protein (g/day):  (1.3-1.5 gm/kg)  Method Used for Fluid Requirements: Standard renal  Fluid (ml/day):      Nutrition Related Findings:   Edema: none  Last BM: 08/09/22, Loose    Wounds: Wound consult pending, Multiple      Current Nutrition Therapies:  Diet: NPO  Supplements: none  Meal intake: No data found. Supplement intake: No data found.   Nutrition Support: TF: Nepro at 40 ml/hr + 100 ml h20 flush q4h      Anthropometric Measures:  Height: 5' 8\" (172.7 cm)  Ideal Body Weight (IBW): 154 lbs (70 kg)     Current Body Wt:  68.4 kg (150 lb 12.7 oz), 97.9 % IBW. Bed scale  Current BMI (kg/m2): 22.9        Weight Adjustment: Paraplegia  Total Amputation Percentage: 7.5  Adjusted Ideal Body Weight (lbs) (Calculated): 142.5  Adjusted Ideal Body Weight (kg) (Calculated): 64.77 kg  Adjusted % IBW (Calculated): 105.8  Adjusted BMI (Calculated): 24.6  BMI Category: Normal weight (BMI 18.5-24. 9)    Wt Readings from Last 10 Encounters:   08/10/22 68.4 kg (150 lb 12.7 oz)           Nutrition Diagnosis:   Inadequate oral intake related to inadequate protein-energy intake, swallowing difficulty as evidenced by nutrition support-enteral nutrition, NPO or clear liquid status due to medical condition    Nutrition Interventions:   Food and/or Nutrient Delivery: Continue tube feeding  Nutrition Education/Counseling: No recommendations at this time  Coordination of Nutrition Care: Continue to monitor while inpatient       Goals:     Goals: other (specify)  Specify Other Goals: Meet > 90% EEN's with EN over next 5-7 days    Nutrition Monitoring and Evaluation:   Behavioral-Environmental Outcomes: None identified  Food/Nutrient Intake Outcomes: Enteral nutrition intake/tolerance  Physical Signs/Symptoms Outcomes: Biochemical data, GI status, Weight, Hemodynamic status    Discharge Planning:    Enteral nutrition    Elicia Araya RD  Available via [employer]

## 2022-08-10 NOTE — WOUND CARE
Wound Care Note:     New consult placed by nurse request for sacral wounds    Chart shows:  Admitted for sepsis, UTI with a history of non-ischemic cardiomyopathy with EF 15 to 20%, afib, PE, pulmonary HTN, PEA cardiac arrest, chronic respiratory fialure with trach, acute on chronic anemia, multiple wounds, sysphagia, chronic hypotension, DM, paraplegia, supapubic catheter    WBC = 13.0 on 8/10/22  Admitted from [address]:   Patient is A&O x 4, communicative (mouths words),  incontinent with moderate assistance needed in repositioning. Verbal consent given for wound photography  Bed: In Touch [place]- Ordered an Envella  Patient has a Richter. Diet: Adult tube feeding- PEG  Patient pre-medicated for pain by RN. 1. POA sacral stage 4 pressure injury measures 12 cm x 15 cm x 3.6 cm, wound bed is 35% moist eschar and 65% red, non-granulation tissue, undermining from 10 o'clock to 3 o'clock with deepest being 3 cm, tunnel at 3 o'clock measures 7.2 , moderate amount of brown drainage, wound edges are rolled, alycia-wound intact with scar formation, no erythema. Wet to dry dressing applied. 2.  POA left hip unstageable pressure injury measures 9 cm x 7.5 cm x 3 cm, wound bed is 40% eschar and 60% pink granulation tissue, small amount of green drainage, wound edges are open, alycia-wound with hyperpigmentation, no erythema. Cutimed Sorbact, gauze and ABD pad applied. 3.  POA left ischial stage 4 pressure injury measures 10 cm x 6 cm x 3 cm, undermining at 12 is 3 cm, wound bed is 75% pink and 25% slough both proximally and distally, small area of exposed bone, wound edges are open, alycia-wound intact with some hyperpigmentation but no erythema. Wet to dry dressing applied.         4.  POA right hip stage 4 pressure injury measures 9 cm x 10 cm x 3.4 cm, wound bed is mostly pink, small amount of granulation tissue, tunnel located on deep area on medial aspect at 12 o'clock and tunnels toward 1 o'clock and measures 7.6 cm, probes to bone, moderate amount of sero/sang drainage, wound edges are rolled, alycia-wound with scar formation and hyperpigmentation, no erythema. Wet to dry dressing applied. 5.  POA right ischial stage 4 pressure injury measures 8.5 cm x 5.5 cm x 3 cm, wound bed with 20% superficial slough, remaining wound bed is beefy red, granulation tissue with some superficial dark film scattered, wound edges are open, alycia-wound with scar formation and hyperpigmentation, no erythema. Wet to dry dressing applied. 6.  POA right lateral lower leg unstageable pressure injury measures 26 cm x 4.5 cm, wound bed is thick dark eschar on distal end, proximal end has been stained green from drainage, moderate amount of green drainage, wound edges are open, alycia-wound with hyperpigmentation and posterior aspect has a thin linear line with superficial eschar, measures 4.5 cm x 0.8 cm, no drainage. Cutimed Sorbact and ABD's applied. 7.  POA right heel unstageable pressure injury measures 2.8 cm x 2.3 cm, covered with black eschar, no drainage, alycia-wound with hyperpigmentation. Left open to air. Offloaded      8. POA right lateral ankle with superficial crusting, measures 1.4 cm x 1.2 cm, healing wound, no drainage, no erythema, alycia-wound with hyperpigmentation. Seen in picture for wound #7. Left open to air. 9.  POA right medial knee with unstageable pressure injury measuring 1.4 cm x 1.2 cm, wound bed is 50% pink and 50% slough, scant serous, wound edges are open, alycia-wound intact. Hydrocolloid applied. 10.  POA left heel unstageable pressure injury measures 7 cm x 6 cm, 100% black eschar, scant serous drainage, alycia-wound with light hyperpigmentation. Betadine applied. Left open to air. 11.   POA left lateral lower leg abrasion measures 1 cm x 1.3 cm x 0.1 cm, wound bed is pink, blanches, wound edges are open, alycia-wound intact. Hydrocolloid applied. Spoke with Dr. Mann Candelario, notified of POA pressure injures, green drainage to left hip and right lateral lower leg, specialty bed and wanted to consult general surgery for debridement; orders obtained. Patient repositioned on right side. Heels offloaded on pillows. Recommendations:    Sacrum, bilateral hips, bilateral ischials, right lateral lower leg- Daily cleanse with VASHE (blue bottle in room), moisten roll gauze with VASHE and loose fill wounds and cover. Left heel- Every other day paint with Betadine    Left lateral lower leg and right medial knee- Please maintain Exuderm Hydrocolloid up to one week or change as needed with soiling or rolled edges. Please use adhesive remover wipes when changing. Would like General Surgery consult to debride sacrum, right lateral lower leg and left hip wounds. Skin Care & Pressure Prevention:  Minimize layers of linen/pads under patient to optimize support surface. Turn/reposition approximately every 2 hours and offload heels.   Manage incontinence / promote continence   Nourishing Skin Cream to dry skin, minimize use of briefs when able    Discussed above plan with patient & Calista Collins RN    Transition of Care: Plan to follow as needed while admitted to hospital.    RAYA Patrick, RN, [facility]  Certified Wound and Ostomy Nurse  office [phone]  Best way to contact me is through [address] [place] [address]

## 2022-08-11 LAB
ALBUMIN SERPL-MCNC: 0.9 G/DL (ref 3.5–5)
ALBUMIN SERPL-MCNC: 1.6 G/DL (ref 3.5–5)
ANION GAP SERPL CALC-SCNC: 6 MMOL/L (ref 5–15)
ANION GAP SERPL CALC-SCNC: 8 MMOL/L (ref 5–15)
BUN SERPL-MCNC: 23 MG/DL (ref 6–20)
BUN SERPL-MCNC: 28 MG/DL (ref 6–20)
BUN/CREAT SERPL: 20 (ref 12–20)
BUN/CREAT SERPL: 21 (ref 12–20)
CALCIUM SERPL-MCNC: 8.1 MG/DL (ref 8.5–10.1)
CALCIUM SERPL-MCNC: 8.1 MG/DL (ref 8.5–10.1)
CHLORIDE SERPL-SCNC: 106 MMOL/L (ref 97–108)
CHLORIDE SERPL-SCNC: 106 MMOL/L (ref 97–108)
CO2 SERPL-SCNC: 26 MMOL/L (ref 21–32)
CO2 SERPL-SCNC: 28 MMOL/L (ref 21–32)
CREAT SERPL-MCNC: 1.12 MG/DL (ref 0.7–1.3)
CREAT SERPL-MCNC: 1.39 MG/DL (ref 0.7–1.3)
ERYTHROCYTE [DISTWIDTH] IN BLOOD BY AUTOMATED COUNT: 14.8 % (ref 11.5–14.5)
ERYTHROCYTE [DISTWIDTH] IN BLOOD BY AUTOMATED COUNT: 14.8 % (ref 11.5–14.5)
GLUCOSE BLD STRIP.AUTO-MCNC: 125 MG/DL (ref 65–117)
GLUCOSE SERPL-MCNC: 108 MG/DL (ref 65–100)
GLUCOSE SERPL-MCNC: 144 MG/DL (ref 65–100)
HCT VFR BLD AUTO: 19.6 % (ref 36.6–50.3)
HCT VFR BLD AUTO: 21.4 % (ref 36.6–50.3)
HCT VFR BLD AUTO: 22.9 % (ref 36.6–50.3)
HGB BLD-MCNC: 6.2 G/DL (ref 12.1–17)
HGB BLD-MCNC: 6.6 G/DL (ref 12.1–17)
HGB BLD-MCNC: 7.1 G/DL (ref 12.1–17)
HISTORY CHECKED?,CKHIST: NORMAL
MAGNESIUM SERPL-MCNC: 2 MG/DL (ref 1.6–2.4)
MAGNESIUM SERPL-MCNC: 2.2 MG/DL (ref 1.6–2.4)
MCH RBC QN AUTO: 27.3 PG (ref 26–34)
MCH RBC QN AUTO: 27.9 PG (ref 26–34)
MCHC RBC AUTO-ENTMCNC: 30.8 G/DL (ref 30–36.5)
MCHC RBC AUTO-ENTMCNC: 31.6 G/DL (ref 30–36.5)
MCV RBC AUTO: 88.3 FL (ref 80–99)
MCV RBC AUTO: 88.4 FL (ref 80–99)
NRBC # BLD: 0 K/UL (ref 0–0.01)
NRBC # BLD: 0 K/UL (ref 0–0.01)
NRBC BLD-RTO: 0 PER 100 WBC
NRBC BLD-RTO: 0 PER 100 WBC
PHOSPHATE SERPL-MCNC: 1.9 MG/DL (ref 2.6–4.7)
PHOSPHATE SERPL-MCNC: 2.6 MG/DL (ref 2.6–4.7)
PLATELET # BLD AUTO: 142 K/UL (ref 150–400)
PLATELET # BLD AUTO: 162 K/UL (ref 150–400)
PMV BLD AUTO: 11.1 FL (ref 8.9–12.9)
PMV BLD AUTO: 11.6 FL (ref 8.9–12.9)
POTASSIUM SERPL-SCNC: 3.1 MMOL/L (ref 3.5–5.1)
POTASSIUM SERPL-SCNC: 3.5 MMOL/L (ref 3.5–5.1)
PROCALCITONIN SERPL-MCNC: 3.38 NG/ML
RBC # BLD AUTO: 2.22 M/UL (ref 4.1–5.7)
RBC # BLD AUTO: 2.42 M/UL (ref 4.1–5.7)
SERVICE CMNT-IMP: ABNORMAL
SODIUM SERPL-SCNC: 140 MMOL/L (ref 136–145)
SODIUM SERPL-SCNC: 140 MMOL/L (ref 136–145)
VANCOMYCIN SERPL-MCNC: 18.5 UG/ML
WBC # BLD AUTO: 6.7 K/UL (ref 4.1–11.1)
WBC # BLD AUTO: 7.3 K/UL (ref 4.1–11.1)

## 2022-08-11 PROCEDURE — 0HDJXZZ EXTRACTION OF LEFT UPPER LEG SKIN, EXTERNAL APPROACH: ICD-10-PCS | Performed by: SURGERY

## 2022-08-11 PROCEDURE — 74011000250 HC RX REV CODE- 250: Performed by: INTERNAL MEDICINE

## 2022-08-11 PROCEDURE — 92612 ENDOSCOPY SWALLOW (FEES) VID: CPT

## 2022-08-11 PROCEDURE — 74011250636 HC RX REV CODE- 250/636: Performed by: STUDENT IN AN ORGANIZED HEALTH CARE EDUCATION/TRAINING PROGRAM

## 2022-08-11 PROCEDURE — 92597 ORAL SPEECH DEVICE EVAL: CPT

## 2022-08-11 PROCEDURE — 86922 COMPATIBILITY TEST ANTIGLOB: CPT

## 2022-08-11 PROCEDURE — 86920 COMPATIBILITY TEST SPIN: CPT

## 2022-08-11 PROCEDURE — 74011000258 HC RX REV CODE- 258: Performed by: INTERNAL MEDICINE

## 2022-08-11 PROCEDURE — 74011000258 HC RX REV CODE- 258: Performed by: STUDENT IN AN ORGANIZED HEALTH CARE EDUCATION/TRAINING PROGRAM

## 2022-08-11 PROCEDURE — 87205 SMEAR GRAM STAIN: CPT

## 2022-08-11 PROCEDURE — 84145 PROCALCITONIN (PCT): CPT

## 2022-08-11 PROCEDURE — 86921 COMPATIBILITY TEST INCUBATE: CPT

## 2022-08-11 PROCEDURE — 83735 ASSAY OF MAGNESIUM: CPT

## 2022-08-11 PROCEDURE — 82962 GLUCOSE BLOOD TEST: CPT

## 2022-08-11 PROCEDURE — 0HD6XZZ EXTRACTION OF BACK SKIN, EXTERNAL APPROACH: ICD-10-PCS | Performed by: SURGERY

## 2022-08-11 PROCEDURE — 74011250636 HC RX REV CODE- 250/636: Performed by: INTERNAL MEDICINE

## 2022-08-11 PROCEDURE — 85018 HEMOGLOBIN: CPT

## 2022-08-11 PROCEDURE — 74011636637 HC RX REV CODE- 636/637: Performed by: NURSE PRACTITIONER

## 2022-08-11 PROCEDURE — 74011250637 HC RX REV CODE- 250/637: Performed by: STUDENT IN AN ORGANIZED HEALTH CARE EDUCATION/TRAINING PROGRAM

## 2022-08-11 PROCEDURE — 74011000250 HC RX REV CODE- 250: Performed by: NURSE PRACTITIONER

## 2022-08-11 PROCEDURE — 74011250637 HC RX REV CODE- 250/637: Performed by: NURSE PRACTITIONER

## 2022-08-11 PROCEDURE — 90945 DIALYSIS ONE EVALUATION: CPT

## 2022-08-11 PROCEDURE — 86880 COOMBS TEST DIRECT: CPT

## 2022-08-11 PROCEDURE — 85027 COMPLETE CBC AUTOMATED: CPT

## 2022-08-11 PROCEDURE — 74011250637 HC RX REV CODE- 250/637: Performed by: INTERNAL MEDICINE

## 2022-08-11 PROCEDURE — 99232 SBSQ HOSP IP/OBS MODERATE 35: CPT | Performed by: SURGERY

## 2022-08-11 PROCEDURE — 80069 RENAL FUNCTION PANEL: CPT

## 2022-08-11 PROCEDURE — 65620000000 HC RM CCU GENERAL

## 2022-08-11 PROCEDURE — 86900 BLOOD TYPING SEROLOGIC ABO: CPT

## 2022-08-11 PROCEDURE — 86902 BLOOD TYPE ANTIGEN DONOR EA: CPT

## 2022-08-11 PROCEDURE — 86870 RBC ANTIBODY IDENTIFICATION: CPT

## 2022-08-11 PROCEDURE — 74011250636 HC RX REV CODE- 250/636: Performed by: SURGERY

## 2022-08-11 PROCEDURE — 36415 COLL VENOUS BLD VENIPUNCTURE: CPT

## 2022-08-11 PROCEDURE — P9047 ALBUMIN (HUMAN), 25%, 50ML: HCPCS | Performed by: INTERNAL MEDICINE

## 2022-08-11 PROCEDURE — 0HDKXZZ EXTRACTION OF RIGHT LOWER LEG SKIN, EXTERNAL APPROACH: ICD-10-PCS | Performed by: SURGERY

## 2022-08-11 PROCEDURE — 87186 SC STD MICRODIL/AGAR DIL: CPT

## 2022-08-11 PROCEDURE — 94003 VENT MGMT INPAT SUBQ DAY: CPT

## 2022-08-11 PROCEDURE — 80202 ASSAY OF VANCOMYCIN: CPT

## 2022-08-11 PROCEDURE — 87077 CULTURE AEROBIC IDENTIFY: CPT

## 2022-08-11 RX ORDER — HYDROMORPHONE HYDROCHLORIDE 1 MG/ML
0.5 INJECTION, SOLUTION INTRAMUSCULAR; INTRAVENOUS; SUBCUTANEOUS
Status: DISCONTINUED | OUTPATIENT
Start: 2022-08-11 | End: 2022-08-31

## 2022-08-11 RX ORDER — FENTANYL CITRATE 50 UG/ML
INJECTION, SOLUTION INTRAMUSCULAR; INTRAVENOUS
Status: DISPENSED
Start: 2022-08-11 | End: 2022-08-12

## 2022-08-11 RX ORDER — SODIUM CHLORIDE 9 MG/ML
250 INJECTION, SOLUTION INTRAVENOUS AS NEEDED
Status: DISCONTINUED | OUTPATIENT
Start: 2022-08-11 | End: 2022-09-01 | Stop reason: HOSPADM

## 2022-08-11 RX ORDER — HYDROMORPHONE HYDROCHLORIDE 1 MG/ML
0.2 INJECTION, SOLUTION INTRAMUSCULAR; INTRAVENOUS; SUBCUTANEOUS ONCE
Status: COMPLETED | OUTPATIENT
Start: 2022-08-11 | End: 2022-08-11

## 2022-08-11 RX ORDER — MIDODRINE HYDROCHLORIDE 5 MG/1
15 TABLET ORAL EVERY 8 HOURS
Status: DISCONTINUED | OUTPATIENT
Start: 2022-08-11 | End: 2022-08-14

## 2022-08-11 RX ORDER — ALBUMIN HUMAN 250 G/1000ML
25 SOLUTION INTRAVENOUS EVERY 8 HOURS
Status: COMPLETED | OUTPATIENT
Start: 2022-08-11 | End: 2022-08-13

## 2022-08-11 RX ORDER — FENTANYL CITRATE 50 UG/ML
50 INJECTION, SOLUTION INTRAMUSCULAR; INTRAVENOUS ONCE
Status: COMPLETED | OUTPATIENT
Start: 2022-08-11 | End: 2022-08-11

## 2022-08-11 RX ORDER — HYDROCODONE BITARTRATE AND ACETAMINOPHEN 10; 325 MG/1; MG/1
1 TABLET ORAL
Status: DISCONTINUED | OUTPATIENT
Start: 2022-08-11 | End: 2022-08-31

## 2022-08-11 RX ADMIN — FENTANYL CITRATE 50 MCG: 50 INJECTION, SOLUTION INTRAMUSCULAR; INTRAVENOUS at 14:55

## 2022-08-11 RX ADMIN — PIPERACILLIN AND TAZOBACTAM 3.38 G: 3; .375 INJECTION, POWDER, LYOPHILIZED, FOR SOLUTION INTRAVENOUS at 23:07

## 2022-08-11 RX ADMIN — MIDODRINE HYDROCHLORIDE 15 MG: 5 TABLET ORAL at 13:17

## 2022-08-11 RX ADMIN — Medication 10 ML: at 14:25

## 2022-08-11 RX ADMIN — CHLORHEXIDINE GLUCONATE 15 ML: 1.2 RINSE ORAL at 08:25

## 2022-08-11 RX ADMIN — CHOLESTYRAMINE 4 G: 4 POWDER, FOR SUSPENSION ORAL at 17:46

## 2022-08-11 RX ADMIN — ALBUMIN (HUMAN) 25 G: 0.25 INJECTION, SOLUTION INTRAVENOUS at 21:47

## 2022-08-11 RX ADMIN — CALCIUM CHLORIDE, MAGNESIUM CHLORIDE, DEXTROSE MONOHYDRATE, LACTIC ACID, SODIUM CHLORIDE, SODIUM BICARBONATE AND POTASSIUM CHLORIDE: 5.15; 2.03; 22; 5.4; 6.46; 3.09; .157 INJECTION INTRAVENOUS at 00:00

## 2022-08-11 RX ADMIN — APIXABAN 5 MG: 5 TABLET, FILM COATED ORAL at 14:25

## 2022-08-11 RX ADMIN — NOREPINEPHRINE BITARTRATE 4 MCG/MIN: 1 INJECTION, SOLUTION, CONCENTRATE INTRAVENOUS at 07:03

## 2022-08-11 RX ADMIN — Medication 5 UNITS: at 08:25

## 2022-08-11 RX ADMIN — ATORVASTATIN CALCIUM 40 MG: 40 TABLET, FILM COATED ORAL at 21:47

## 2022-08-11 RX ADMIN — HYDROMORPHONE HYDROCHLORIDE 0.2 MG: 1 INJECTION, SOLUTION INTRAMUSCULAR; INTRAVENOUS; SUBCUTANEOUS at 01:33

## 2022-08-11 RX ADMIN — MINERAL SUPPLEMENT IRON 300 MG / 5 ML STRENGTH LIQUID 100 PER BOX UNFLAVORED 300 MG: at 13:17

## 2022-08-11 RX ADMIN — Medication 10 ML: at 22:00

## 2022-08-11 RX ADMIN — FLUDROCORTISONE ACETATE 0.1 MG: 0.1 TABLET ORAL at 08:25

## 2022-08-11 RX ADMIN — VANCOMYCIN HYDROCHLORIDE 750 MG: 750 INJECTION, POWDER, LYOPHILIZED, FOR SOLUTION INTRAVENOUS at 17:46

## 2022-08-11 RX ADMIN — POTASSIUM BICARBONATE 20 MEQ: 782 TABLET, EFFERVESCENT ORAL at 17:55

## 2022-08-11 RX ADMIN — HYDROCODONE BITARTRATE AND ACETAMINOPHEN 1 TABLET: 10; 325 TABLET ORAL at 10:23

## 2022-08-11 RX ADMIN — CALCIUM CHLORIDE, MAGNESIUM CHLORIDE, DEXTROSE MONOHYDRATE, LACTIC ACID, SODIUM CHLORIDE, SODIUM BICARBONATE AND POTASSIUM CHLORIDE: 3.68; 3.05; 22; 5.4; 6.46; 3.09; .314 INJECTION INTRAVENOUS at 17:55

## 2022-08-11 RX ADMIN — ALBUMIN (HUMAN) 25 G: 0.25 INJECTION, SOLUTION INTRAVENOUS at 13:17

## 2022-08-11 RX ADMIN — ALBUMIN (HUMAN) 25 G: 0.25 INJECTION, SOLUTION INTRAVENOUS at 09:32

## 2022-08-11 RX ADMIN — LEVOTHYROXINE SODIUM 50 MCG: 0.05 TABLET ORAL at 06:01

## 2022-08-11 RX ADMIN — MINERAL SUPPLEMENT IRON 300 MG / 5 ML STRENGTH LIQUID 100 PER BOX UNFLAVORED 300 MG: at 08:25

## 2022-08-11 RX ADMIN — PIPERACILLIN AND TAZOBACTAM 3.38 G: 3; .375 INJECTION, POWDER, LYOPHILIZED, FOR SOLUTION INTRAVENOUS at 14:25

## 2022-08-11 RX ADMIN — EPOETIN ALFA-EPBX 10000 UNITS: 10000 INJECTION, SOLUTION INTRAVENOUS; SUBCUTANEOUS at 21:34

## 2022-08-11 RX ADMIN — IRON SUCROSE 200 MG: 20 INJECTION, SOLUTION INTRAVENOUS at 11:02

## 2022-08-11 RX ADMIN — CHOLESTYRAMINE 4 G: 4 POWDER, FOR SUSPENSION ORAL at 14:22

## 2022-08-11 RX ADMIN — SODIUM PHOSPHATE, MONOBASIC, MONOHYDRATE: 276; 142 INJECTION, SOLUTION INTRAVENOUS at 09:43

## 2022-08-11 RX ADMIN — CHOLESTYRAMINE 4 G: 4 POWDER, FOR SUSPENSION ORAL at 09:33

## 2022-08-11 RX ADMIN — APIXABAN 5 MG: 5 TABLET, FILM COATED ORAL at 04:00

## 2022-08-11 RX ADMIN — MIDODRINE HYDROCHLORIDE 15 MG: 5 TABLET ORAL at 21:47

## 2022-08-11 RX ADMIN — MEXILETINE HYDROCHLORIDE 150 MG: 150 CAPSULE ORAL at 17:46

## 2022-08-11 RX ADMIN — PIPERACILLIN AND TAZOBACTAM 3.38 G: 3; .375 INJECTION, POWDER, LYOPHILIZED, FOR SOLUTION INTRAVENOUS at 06:01

## 2022-08-11 RX ADMIN — MIDODRINE HYDROCHLORIDE 10 MG: 5 TABLET ORAL at 08:25

## 2022-08-11 RX ADMIN — CHLORHEXIDINE GLUCONATE 15 ML: 1.2 RINSE ORAL at 21:46

## 2022-08-11 RX ADMIN — CALCIUM CHLORIDE, MAGNESIUM CHLORIDE, DEXTROSE MONOHYDRATE, LACTIC ACID, SODIUM CHLORIDE, SODIUM BICARBONATE AND POTASSIUM CHLORIDE: 5.15; 2.03; 22; 5.4; 6.46; 3.09; .157 INJECTION INTRAVENOUS at 04:00

## 2022-08-11 RX ADMIN — CALCIUM CHLORIDE, MAGNESIUM CHLORIDE, DEXTROSE MONOHYDRATE, LACTIC ACID, SODIUM CHLORIDE, SODIUM BICARBONATE AND POTASSIUM CHLORIDE: 3.68; 3.05; 22; 5.4; 6.46; 3.09; .314 INJECTION INTRAVENOUS at 17:53

## 2022-08-11 RX ADMIN — NOREPINEPHRINE BITARTRATE 2 MCG/MIN: 1 INJECTION, SOLUTION, CONCENTRATE INTRAVENOUS at 10:03

## 2022-08-11 RX ADMIN — CALCIUM CHLORIDE, MAGNESIUM CHLORIDE, DEXTROSE MONOHYDRATE, LACTIC ACID, SODIUM CHLORIDE, SODIUM BICARBONATE AND POTASSIUM CHLORIDE: 5.15; 2.03; 22; 5.4; 6.46; 3.09; .157 INJECTION INTRAVENOUS at 00:02

## 2022-08-11 RX ADMIN — CALCIUM CHLORIDE, MAGNESIUM CHLORIDE, DEXTROSE MONOHYDRATE, LACTIC ACID, SODIUM CHLORIDE, SODIUM BICARBONATE AND POTASSIUM CHLORIDE: 5.15; 2.03; 22; 5.4; 6.46; 3.09; .157 INJECTION INTRAVENOUS at 09:32

## 2022-08-11 RX ADMIN — MINERAL SUPPLEMENT IRON 300 MG / 5 ML STRENGTH LIQUID 100 PER BOX UNFLAVORED 300 MG: at 17:46

## 2022-08-11 RX ADMIN — CALCIUM CHLORIDE, MAGNESIUM CHLORIDE, DEXTROSE MONOHYDRATE, LACTIC ACID, SODIUM CHLORIDE, SODIUM BICARBONATE AND POTASSIUM CHLORIDE: 3.68; 3.05; 22; 5.4; 6.46; 3.09; .314 INJECTION INTRAVENOUS at 17:56

## 2022-08-11 RX ADMIN — HYDROMORPHONE HYDROCHLORIDE 0.5 MG: 1 INJECTION, SOLUTION INTRAMUSCULAR; INTRAVENOUS; SUBCUTANEOUS at 15:49

## 2022-08-11 NOTE — PROGRESS NOTES
Name: Jabari Blanco MRN: [id number]   : 1980 [facility]: [facility]   Date: 2022        IMPRESSION:   YADY, started on iHD initially. patient became hypotensive and was switched to CRT. Hypotension on pressors  Hypophosphatemia  Debility with multiple pressure ulcers  Respiratory failure - on vent  Anemia of chronic disease. Hyperkalemia - resolved with RRT  Protein deficiency  wounds      PLAN:   Continue CRT for now. Will start removing fluid. Goal -50 ml/hr if tolerates  -IV albumin  -increase midodrine to 15 mg tid  Watch for GFR recovery. Prior to starting dialysis patient's Scr was 0.8-low urine output  Start Epo, IV Iron  Replete phos-oredered  Tube feeding  Poor overall prognosis. Subjective/Interval History:   I have reviewed the flowsheet and previous days notes. Seen on CRRT, awake and alert, denies pain/SOB with nodding head    ROS:Pertinent items are noted in HPI. Not required    Objective:   Vital Signs:    Visit Vitals  BP (!) 74/46   Pulse 84   Temp 97.6 °F (36.4 °C)   Resp 12   Ht 5' 8\" (1.727 m)   Wt 67.4 kg (148 lb 8 oz)   SpO2 100%   BMI 22.58 kg/m²       O2 Device: Tracheostomy, Ventilator       Temp (24hrs), Av.9 °F (36.1 °C), Min:96.4 °F (35.8 °C), Max:97.6 °F (36.4 °C)       Intake/Output:   Last shift:      701 - 1900  In: -   Out: 75 [Urine:75]  Last 3 shifts: 1901 -  07  In: 2415.3 [I.V.:1495.3]  Out: 1715 [Urine:25]    Intake/Output Summary (Last 24 hours) at 2022 0859  Last data filed at 2022 0800  Gross per 24 hour   Intake 1304.78 ml   Output 1275 ml   Net 29.78 ml          Physical Exam:  General:    Alert, awake, nods his head. Head:   Normocephalic, without obvious abnormality, atraumatic. Bitemporal muscle waisting. Eyes:   Conjunctivae/corneas clear. Neck:  Trach  Lungs:   Clear to auscultation bilaterally. No Wheezing or Rhonchi. No rales.   Heart:   Regular rate and rhythm,  no murmur, rub or gallop. Abdomen:   Soft, non-tender. Not distended. Bowel sounds normal. No masses, PEG  Extremities: Severe muscle waisting. 2+ edema. Skin:     Texture, turgor normal. No rashes or lesions. Not Jaundiced  Psych:  Not anxious or agitated. Neurologic: Dense LE paraplegia. DATA:  Labs:  Recent Labs     08/11/22  0400 08/10/22  0439 08/09/22  2102 08/09/22  0647 08/08/22  1936    137  --  140 139   K 3.5 4.2  --  5.5* 5.6*    105  --  110* 108   CO2 26 23  --  26 25   BUN 28* 45*  --  63* 59*   CREA 1.39* 2.02*  --  2.88* 2.75*   CA 8.1* 7.9*  --  7.7* 7.4*   ALB 0.9* 1.2*  --   --  0.9*   PHOS 1.9* 3.8  --   --   --    MG 2.2 2.3 2.2  --   --        Recent Labs     08/11/22  0746 08/11/22  0400 08/10/22  0443 08/09/22  0647   WBC  --  6.7 13.0* 8.2   HGB 7.1* 6.6* 8.1* 8.9*   HCT 22.9* 21.4* 24.9* 27.8*   PLT  --  162 216 198       No results for input(s): YAMIL, KU, CLU, CREAU in the last 72 hours.     No lab exists for component: PROU    Total time spent with patient:  35 minutes    [x] Critical Care Provided    Care Plan discussed with:  ICU RN    Yue Henson MD

## 2022-08-11 NOTE — PROGRESS NOTES
0500: levophed stopped. 0600:H&h 6.6, repeat h&h drawn and type and screen sent to lab.  0615: crrt alarming, access extremely negative - lines switched. Alarms resolved. 0007: levophed restarted for map 53. 0730: Bedside and Verbal shift change report given to Raffy Garcia Dr (oncoming nurse) by Yosi Esqueda (offgoing nurse). Report included the following information SBAR, Kardex, Procedure Summary, Intake/Output, MAR, Accordion, and Cardiac Rhythm nsr .

## 2022-08-11 NOTE — PROGRESS NOTES
JADEN: Anticipate discharge back to [facility] pending medical progress. Transportation likely ALS with [facility]. RUR: 15%    Emergency contact: libby Avilaer, [phone] or [phone]    Disposition: Patient admitted from [facility] 8/9 for YADY. Patient has a trach and is on a vent. Per nursing staff, communicates appropriately by nodding head yes and no. Hx: paraplegia, skin ulcers, afib, chronic sepsis, cardiac arrest, CVA, chronic loya, tracheostomy ventilator, gtube, pulmonary embolism, systolic CHF, HTN, HLD, diabetes type II. CM spoke with patient's brother, Pepe Marinelli, on this date. Per brother, patient has an apartment where he was living alone prior to deterioration of health at the beginning of 2022. Patient had assistance with ADLs from care aides. Brother stated they would prefer that patient be closer to the [place] areas as that is where family resides. CM will continue to follow.     Vladimir Meneses, [facility] [place],[address]  [phone]

## 2022-08-11 NOTE — DIALYSIS
CRRT / [phone]           Orders   Mode: CVVH restarted @ 1350   Blood Flow Rate: 200 ml/min   Prismasol Dose: 1,500 ml/hr  PBP: 750 ml/hr  Replacement: 750 ml/hr   Prismasol Concentrate: 4K / 2.5Ca   Blood Warmer Temp: 37*C   Net Fluid Removal: 50ml/hr as tolerated per md order            Metrics:   BP: 120/75   HR: 77   Access Pressure: -51 *reversed   Filter Pressure: 95   Return Pressure: 56   TMP: 34   Pressure Drop: 19            Access   Type & Location: RIJ tunneled CVC: tegaderm dressing with CHG patch C/D/I, dated 8/10/22. Lines reversed. Curvature of lines present upon arrival. Presenting a moderate degree of difficulty while running on CRRT. Positional, monitor closely. Labs   HBsAg (Antigen) / date: Negative 8/9/22                 HBsAb (Antibody) / date: Susceptible 8/9/22   Source: Epic            Safety:   Time Out Done:   1330   Consent obtained/signed: Verified   Primary Nurse Rpt: Tonia Drought, RN      Comments / Plan:   Code status, labs, notes and orders reviewed. Time out complete. CVVH restarted due to large visible clot in deaeration chamber. All possible blood returned by CRRT RN. Edelmira Lopez primed and tested. Lines reversed, visible and connections secure with blood warmer supported on return line and set at 37*C. Heparin infusing @ 500 units/hr. Education & pre/post to primary RN.

## 2022-08-11 NOTE — PROGRESS NOTES
0730 Bedside and Verbal shift change report given to Kacey Olivier (oncoming nurse) by Lazarus Frederickson (offgoing nurse). Report included the following information SBAR, Kardex, Procedure Summary, Intake/Output, MAR, Accordion, and Recent Results.

## 2022-08-11 NOTE — PROGRESS NOTES
CRITICAL CARE NOTE      Name: Keri Payne   : 1980   MRN: [id number]   Date: 2022      REASON FOR ICU ADMISSION:  Acute renal failure, chronic vent dependence, septic shock     PRINCIPAL ICU DIAGNOSIS   Septic shock, unclear etiology  Acute renal injury  Chronic hypoxic respiratory failure, s/p trach, vent dependent  Chronic decubitus pressure injury ulcers, present on admission  HFrEF (20-25%)  Afib  Chronic hypotension  Paraplegia    BRIEF PATIENT SUMMARY   57-year-old male with known past medical history of multiple chronic issues as listed in problem list, who presented to [facility] ED after being sent from [address] [place] [address] for possible need for CRRT for YADY due to inability to tolerate iHD due to sepsis/septic shock from unclear source. COMPREHENSIVE ASSESSMENT & PLAN:SYSTEM BASED     24 HOUR EVENTS:   No acute overnight events, on minimal levo. Remains on CRRT. On TF, cleared for PO by SLP. NEUROLOGICAL:   -Awake and alert in no acute distress  -history of paraplegia  - PT/OT SLP consulted  - Dysphagia with GJ tube in place, however cleared for PO by SLP    PULMONOLOGY:   -Chronic respiratory failure with trach in place ( placed 2022)  -Vent management, PSV as tolerated.  Consider T trial if on minimal support  -PRN nebs/suctioning  -ABCDEF protocol    CARDIOVASCULAR:   - wean levophed as tolerated  - Atrial fibrillation on eliquis  -Non-ischemic cardiomyopathy with EF 15-20%  -Recent PEA arrest (multiple)  -Pulmonary hypertension with right ventricular dysfunction  -Continue midodrine, florinef, mexiletine  - holding entresto due to hypotension    GASTROINTESTINAL   -Colostomy care  -Continue questran  - Start PO, can stop TF if adequate intake     RENAL/ELECTROLYTE/FLUIDS:   - CRRT per nephology, appreciate assistance   -Trend lytes and replete as needed  -Florinef, midodrine, EPO  -Suprapubic catheter care    ENDOCRINE:   -Continue synthroid    HEMATOLOGY/ONCOLOGY:   - No signs of acute bleeding  - on eliquis    INFECTIOUS DISEASE:   Continue Vanc/Zosyn  Bcx NGTD  Sputum Cs light GNR    PCT 3.38, trend    Unclear source, HAP vs CAUTI vs wound infection  Wound care eval, Gen surg consulted for debridement for purulence in several of wounds    ICU DAILY CHECKLIST     Code Status:Partial (No Shocks, No Compressions) Meds okay  DVT Prophylaxis:Eliquis  T/L/D: Ye, GJ tube, Colostomy, suprapubic catheter, PIV  SUP: None  Diet: Tube Feed,PO as tolerated  Activity Level:Paraplegia  ABCDEF Bundle/Checklist Completed:Yes  Disposition: Stay in ICU  Multidisciplinary Rounds Completed:  Pending  Patient/Family Updated: Yes       HOSPITAL COURSE/DAILY EVENT LOG   8/ pan Cx started on Vanc/Zosyn  8/9 started n CRRT levophed    SUBJECTIVE   Review of Systems   Constitutional:  Negative for chills, diaphoresis and fever. HENT:  Negative for congestion, ear pain, hearing loss, nosebleeds, sinus pain and sore throat. Eyes:  Negative for blurred vision, double vision and pain. Respiratory:  Negative for cough, sputum production, shortness of breath and wheezing. Cardiovascular:  Negative for chest pain, palpitations and orthopnea. Gastrointestinal:  Negative for abdominal pain, constipation, heartburn, nausea and vomiting. Genitourinary:  Negative for hematuria. Musculoskeletal:  Negative for falls, joint pain and myalgias. Skin:  Negative for itching and rash. Neurological:  Negative for dizziness, tremors, speech change, focal weakness, weakness and headaches. Endo/Heme/Allergies:  Does not bruise/bleed easily. Psychiatric/Behavioral:  Negative for depression. OBJECTIVE     Labs and Data: Reviewed 08/11/22  Medications: Reviewed 08/11/22  Imaging: Reviewed 08/11/22    Physical Exam  Vitals and nursing note reviewed. Constitutional:       General: He is awake. He is not in acute distress. Appearance: He is underweight. HENT:      Head: Normocephalic and atraumatic. Nose: Nose normal.      Mouth/Throat:      Comments: trach  Eyes:      General: Lids are normal.      Conjunctiva/sclera: Conjunctivae normal.      Pupils: Pupils are equal, round, and reactive to light. Neck:     Cardiovascular:      Rate and Rhythm: Normal rate. Rhythm irregularly irregular. Heart sounds: Normal heart sounds, S1 normal and S2 normal. No murmur heard. No friction rub. No gallop. Pulmonary:      Effort: Pulmonary effort is normal. No tachypnea. Breath sounds: Normal breath sounds and air entry. Comments: On vent  Abdominal:      General: The ostomy site is clean. Bowel sounds are normal.       Musculoskeletal:      Right lower leg: No edema. Left lower leg: No edema. Comments: Chronic numerous pressure ulcer wounds throughout, present on admission see wound care note for further details   Skin:     General: Skin is warm and dry. Capillary Refill: Capillary refill takes 2 to 3 seconds. Coloration: Skin is pale. Findings: Wound present. Neurological:      Mental Status: He is alert, oriented to person, place, and time and easily aroused. Mental status is at baseline. GCS: GCS eye subscore is 4. GCS verbal subscore is 5. GCS motor subscore is 6. Psychiatric:         Behavior: Behavior is cooperative.         Visit Vitals  /71   Pulse 80   Temp 97 °F (36.1 °C)   Resp 11   Ht 5' 8\" (1.727 m)   Wt 67.4 kg (148 lb 8 oz)   SpO2 100%   BMI 22.58 kg/m²      O2 Device: Tracheostomy, Ventilator Temp (24hrs), Av.9 °F (36.1 °C), Min:96.4 °F (35.8 °C), Max:97.6 °F (36.4 °C)           Intake/Output:     Intake/Output Summary (Last 24 hours) at 2022 1456  Last data filed at 2022 1300  Gross per 24 hour   Intake 1843.56 ml   Output 1449 ml   Net 394.56 ml         Imaging       Pertinent imaging reviewed and interpreted independently as noted above    CRITICAL CARE DOCUMENTATION  I had a face to face encounter with the patient, reviewed and interpreted patient data including clinical events, labs, images, vital signs, I/O's, and examined patient. I have discussed the case and the plan and management of the patient's care with the consulting services, the bedside nurses and the respiratory therapist.      NOTE OF PERSONAL INVOLVEMENT IN CARE   This patient has a high probability of imminent, clinically significant deterioration, which requires the highest level of preparedness to intervene urgently. I participated in the decision-making and personally managed or directed the management of the following life and organ supporting interventions that required my frequent assessment to treat or prevent imminent deterioration. I personally spent 45 minutes of critical care time. This is time spent at this critically ill patient's bedside actively involved in patient care as well as the coordination of care. This does not include any procedural time which has been billed separately. Aeb Christina MD  Staff [facility]

## 2022-08-11 NOTE — PROGRESS NOTES
Pharmacist Note - Vancomycin Dosing  Therapy day 3  Indication: HAP  CVVH patient  Current regimen: 1000 mg q24h    Recent Labs     08/11/22  1603 08/11/22  0400 08/10/22  0443 08/10/22  0439   WBC 7.3 6.7 13.0*  --    CREA 1.12 1.39*  --  2.02*   BUN 23* 28*  --  45*       A random vancomycin level of 18.5 mcg/mL was obtained at 16:03 today    Goal target range ~15 mcg/ml      Plan: Change to 750 mg q24h. Pharmacy will continue to monitor this patient daily for changes in clinical status and renal function.

## 2022-08-11 NOTE — PROGRESS NOTES
Physician Progress Note      PATIENT:               Iggy Dia  CSN #:                  [id number]  :                       1980  ADMIT DATE:       2022 7:15 PM  [address] [place] DATE:  RESPONDING  PROVIDER #:        Jessica Caruso MD          QUERY TEXT:    Pt admitted with Sepsis and has CHF documented. If possible, please document in progress notes and discharge summary further specificity regarding the acuity of CHF:    The medical record reflects the following:  Risk Factors: Atrial Fibrillation, Non-Ischemic Cardiomyopathy, EF 15-20%, PHTN, Hx of PEA cardiac arrest    Clinical Indicators:   H&P  CARDIOVASCULAR:  -Atrial fibrillation on eliquis  -Non-ischemic cardiomyopathy with EF 15-20%  -Recent PEA arrest (multiple)  -Pulmonary hypertension with right ventricular dysfunction     Renal Consultant Note  HFrEF, EF 15-20%, Entresto on hold  -had lifevest which has been removed    Treatment: Midodrine 10mg tablet PO 3 times Daily, Entresto (on hold), Florinef 0.1mg tablet PO Daily, Mexitil 150mg capsule PO Q 12hrs    Thank you,  Zada Romberg, RASHIDAN, RN, CCRN  [phone]  Options provided:  -- Acute on Chronic Systolic CHF/HFrEF  -- Acute Systolic CHF/HFrEF  -- Chronic Systolic CHF/HFrEF  -- Other - I will add my own diagnosis  -- Disagree - Not applicable / Not valid  -- Disagree - Clinically unable to determine / Unknown  -- Refer to Clinical Documentation Reviewer    PROVIDER RESPONSE TEXT:    Provider disagreed with this query. Sepsis seems to be the cause of hypotension. Chronically low EF.     Query created by: Theodora Ren on 8/10/2022 10:06 AM      Electronically signed by:  Jessica Caruso MD 8/10/2022 11:16 PM

## 2022-08-11 NOTE — PROGRESS NOTES
Problem: Dysphagia (Adult)  Goal: *Acute Goals and Plan of Care (Insert Text)  Description: Speech Therapy Goals  Initiated 8/11/22    1. Patient will tolerate regular diet/thin liquids without adverse effects within 7 days. Outcome: Progressing Towards Goal      Speech Language Pathology  Flexible Endoscopic Evaluation of Swallowing-FEES  Patient: Anselmo Anderson (82 y.o. male)  Date: 8/11/2022  Primary Diagnosis: YADY (acute kidney injury) ([facility] [address] [N17.9]       Precautions:        ASSESSMENT :  FEES completed with patient on the ventilator with cuff inflated, as has been his baseline. Based on the objective data described below, the patient presents with functional oropharyngeal phases of swallow. Pt does present with presumed weak pharyngeal squeeze resulting in mild-moderate vallecular residue with solid, however, this clears with a liquid wash. No aspiration nor penetration present with any consistency. Recommend pt initiate baseline diet as outlined below. Given patency of airway, will trial in-line PMV. Patient will benefit from skilled intervention to address the above impairments. Patient's rehabilitation potential is considered to be Good     Images taken from FEES:    Baseline (trach noted subglottically)  Vallecular residue with solid- cleared with liquid wash     PLAN :  Recommendations and Planned Interventions:  --regular diet/thin liquids  --can eat/drink on the ventilator  --hold if pt respiratory status worsens  --liquid wash    Frequency/Duration: Patient will be followed by speech-language pathology 4 times a week to address goals. Discharge Recommendations: Inpatient Rehab     SUBJECTIVE:   Patient participative although uncomfortable with scope. OBJECTIVE:   No past medical history on file. No past surgical history on file.   Prior Level of Function/Home Situation:   Home Situation  Support Systems: Other Family Member(s)  Patient [facility]s [facility]de[facility]identified[facility]  Diet prior to admission: reportedly regular diet/thin liquids with G/J tube  Current Diet:  G/J tube    Cognitive and Communication Status:  Neurologic State: Alert  Orientation Level:  (mouths words, unable to verbalize)  Cognition: Follows commands             History/indication for procedure: Chronic trach/G/J tube from complex medical course. Ventilator dependence. Lidocaine used: No  Nostril used: right  Scope Used: Ambu disposable scope  Feeding Tube Present in Nare: No  Adverse Reaction: Yes- pt scrunching his nose and attempting to pull scope out  Respiratory status: Ventilator- cuff inflated        Part I:  Anatomy:       General Comments:      Palate:   WFL   Base of tongue:   WFL   Valleculae:   WFL   Epiglottis:   WFL   Arytenoids:   WFL   False vocal folds:   WFL   Vocal folds:   WFL Pyriform sinus:   WFL         Part II:  Laryngeal Function:    Adduction:   Full   Abduction:   Full   Arytenoid movement:   Symmetric Vocal quality:   Unable to assess as pt on vent         Part III:  Secretions:    [place] Secretion Rating (0-7): 0     Location:  0 - Nil significant pooled secretions in pyriform fossae or laryngeal vestibule Amount:   0 - Nil significant pooled secretions in pyriform fossae (0-20%) Response*:  0 - Secretions in pyriform fossae or laryngeal vestibule effectively cleared   Comments (e.g., quality/texture/color):      *Normal airway responses in the pharynx or laryngeal vestibule may include spontaneous coughing, throat clearing, and/or swallowing        Part IV:  Swallow Trials:    Subjective comments regarding oral phase of swallow: WFL    Comments regarding esophageal phase of swallow: No concerns. Consistency:  Thin liquid  Position of Bolus Pre-Swallow: Valleculae  [place] Pharyngeal Residue Severity Rating Scale: Valleculae residue: 2 - trace; 1-5%, trace coating of the mucosa and Pyriform sinus residue: 2 - trace; 1-5%, trace coating of the mucosa  Spontaneously Cleared: Yes  Penetration: No  Aspiration: No  Response: n/a  [place]bek Penetration-Aspiration Scale: 1 - Material does not enter the airway    Consistency: Solid  Position of Bolus Pre-Swallow: Valleculae  [place] Pharyngeal Residue Severity Rating Scale: Valleculae residue: 3 - mild; 5-25%, epiglottic ligament visible and Pyriform sinus residue: 1 - none; 0%, no residue  Spontaneously Cleared: cleared with liquid wash  Penetration: No  Aspiration: No  Response: n/a  Rosenbek Penetration-Aspiration Scale: 1 - Material does not enter the airway    Dysphagia Severity Rating:   Oral phase: WFL  Pharyngeal phase: WFL      NOMS:   The NOMS functional outcome measure was used to quantify this patient's level of swallowing impairment. Based on the NOMS, the patient was determined to be at level 7 for swallow function         NOMS Swallowing Levels:  Level 1 (CN): NPO  Level 2 (CM): NPO but takes consistency in therapy  Level 3 (CL): Takes less than 50% of nutrition p.o. and continues with nonoral feedings; and/or safe with mod cues; and/or max diet restriction  Level 4 (CK): Safe swallow but needs mod cues; and/or mod diet restriction; and/or still requires some nonoral feeding/supplements  Level 5 (CJ): Safe swallow with min diet restriction; and/or needs min cues  Level 6 (CI): Independent with p.o.; rare cues; usually self cues; may need to avoid some foods or needs extra time  Level 7 ([address] [place] [address]): Independent for all p.o.  KOMAL. (2003). National Outcomes Measurement System (NOMS): Adult Speech-Language Pathology User's Guide. Pain:  Pain Scale 1: Numeric (0 - 10)  Pain Intensity 1: 0  Pain Location 1: Back; Coccyx    COMMUNICATION/EDUCATION:     The patient's plan of care including findings from FEES, recommendations, planned interventions, and recommended diet changes were discussed with: Registered nurse and Respiratory therapist.     Patient/family have participated as able in goal setting and plan of care.     Thank you for this referral.  Patito Hartley, SLP  Time Calculation: 60 mins

## 2022-08-11 NOTE — PROGRESS NOTES
Problem: Falls - Risk of  Goal: *Absence of Falls  Description: Document Chidi Sol Fall Risk and appropriate interventions in the flowsheet. Outcome: Progressing Towards Goal  Note: Fall Risk Interventions:       Mentation Interventions: Evaluate medications/consider consulting pharmacy, Eyeglasses and hearing aids, Familiar objects from home, Door open when patient unattended, Adequate sleep, hydration, pain control, More frequent rounding, Increase mobility, Reorient patient, Room close to nurse's station, Self-releasing belt, Update white board, Toileting rounds    Medication Interventions: Teach patient to arise slowly, Patient to call before getting OOB, Evaluate medications/consider consulting pharmacy    Elimination Interventions: Toileting schedule/hourly rounds, Toilet paper/wipes in reach, Call light in reach, Elevated toilet seat              Problem: Patient Education: Go to Patient Education Activity  Goal: Patient/Family Education  Outcome: Progressing Towards Goal     Problem: Pressure Injury - Risk of  Goal: *Prevention of pressure injury  Description: Document Kavon Scale and appropriate interventions in the flowsheet. Outcome: Progressing Towards Goal  Note: Pressure Injury Interventions:  Sensory Interventions: Check visual cues for pain, Discuss PT/OT consult with provider, Keep linens dry and wrinkle-free, Float heels, Avoid rigorous massage over bony prominences, Assess need for specialty bed, Maintain/enhance activity level, Minimize linen layers, Pressure redistribution bed/mattress (bed type), Turn and reposition approx.  every two hours (pillows and wedges if needed)    Moisture Interventions: Minimize layers, Maintain skin hydration (lotion/cream), Limit adult briefs, Moisture barrier, Apply protective barrier, creams and emollients, Check for incontinence Q2 hours and as needed, Assess need for specialty bed    Activity Interventions: PT/OT evaluation, Pressure redistribution bed/mattress(bed type), Increase time out of bed    Mobility Interventions: HOB 30 degrees or less, Pressure redistribution bed/mattress (bed type), PT/OT evaluation, Turn and reposition approx.  every two hours(pillow and wedges), Float heels, Assess need for specialty bed, Chair cushion    Nutrition Interventions: Discuss nutritional consult with provider, Document food/fluid/supplement intake    Friction and Shear Interventions: HOB 30 degrees or less, Lift sheet, Lift team/patient mobility team, Minimize layers, Apply protective barrier, creams and emollients                Problem: Patient Education: Go to Patient Education Activity  Goal: Patient/Family Education  Outcome: Progressing Towards Goal     Problem: Infection - Risk of, Central Venous Catheter-Associated Bloodstream Infection  Goal: *Absence of infection signs and symptoms  Outcome: Progressing Towards Goal     Problem: Patient Education: Go to Patient Education Activity  Goal: Patient/Family Education  Outcome: Progressing Towards Goal     Problem: Infection - Risk of, Urinary Catheter-Associated Urinary Tract Infection  Goal: *Absence of infection signs and symptoms  Outcome: Progressing Towards Goal     Problem: Patient Education: Go to Patient Education Activity  Goal: Patient/Family Education  Outcome: Progressing Towards Goal     Problem: Infection - Risk of, Ventilator-Associated Pneumonia  Goal: *Absence of infection signs and symptoms  Outcome: Progressing Towards Goal     Problem: Patient Education: Go to Patient Education Activity  Goal: Patient/Family Education  Outcome: Progressing Towards Goal     Problem: Ventilator Management  Goal: *Adequate oxygenation and ventilation  Outcome: Progressing Towards Goal  Goal: *Patient maintains clear airway/free of aspiration  Outcome: Progressing Towards Goal  Goal: *Absence of infection signs and symptoms  Outcome: Progressing Towards Goal  Goal: *Normal spontaneous ventilation  Outcome: Progressing Towards Goal     Problem: Patient Education: Go to Patient Education Activity  Goal: Patient/Family Education  Outcome: Progressing Towards Goal     Problem: Patient Education: Go to Patient Education Activity  Goal: Patient/Family Education  Outcome: Progressing Towards Goal     Problem: Heart Failure: Day 3  Goal: Off Pathway (Use only if patient is Off Pathway)  Outcome: Progressing Towards Goal  Goal: Activity/Safety  Outcome: Progressing Towards Goal  Goal: Diagnostic Test/Procedures  Outcome: Progressing Towards Goal  Goal: Nutrition/Diet  Outcome: Progressing Towards Goal  Goal: Discharge Planning  Outcome: Progressing Towards Goal  Goal: Medications  Outcome: Progressing Towards Goal  Goal: Respiratory  Outcome: Progressing Towards Goal  Goal: Treatments/Interventions/Procedures  Outcome: Progressing Towards Goal  Goal: Psychosocial  Outcome: Progressing Towards Goal  Goal: *Oxygen saturation within defined limits  Outcome: Progressing Towards Goal  Goal: *Hemodynamically stable  Outcome: Progressing Towards Goal  Goal: *Optimal pain control at patient's stated goal  Outcome: Progressing Towards Goal  Goal: *Anxiety reduced or absent  Outcome: Progressing Towards Goal  Goal: *Demonstrates progressive activity  Outcome: Progressing Towards Goal     Problem: Heart Failure: Day 4  Goal: Off Pathway (Use only if patient is Off Pathway)  Outcome: Progressing Towards Goal  Goal: Activity/Safety  Outcome: Progressing Towards Goal  Goal: Diagnostic Test/Procedures  Outcome: Progressing Towards Goal  Goal: Nutrition/Diet  Outcome: Progressing Towards Goal  Goal: Discharge Planning  Outcome: Progressing Towards Goal  Goal: Medications  Outcome: Progressing Towards Goal  Goal: Respiratory  Outcome: Progressing Towards Goal  Goal: Treatments/Interventions/Procedures  Outcome: Progressing Towards Goal  Goal: Psychosocial  Outcome: Progressing Towards Goal  Goal: *Oxygen saturation within defined limits  Outcome: Progressing Towards Goal  Goal: *Hemodynamically stable  Outcome: Progressing Towards Goal  Goal: *Optimal pain control at patient's stated goal  Outcome: Progressing Towards Goal  Goal: *Anxiety reduced or absent  Outcome: Progressing Towards Goal  Goal: *Demonstrates progressive activity  Outcome: Progressing Towards Goal     Problem: Heart Failure: Day 5  Goal: Off Pathway (Use only if patient is Off Pathway)  Outcome: Progressing Towards Goal  Goal: Activity/Safety  Outcome: Progressing Towards Goal  Goal: Diagnostic Test/Procedures  Outcome: Progressing Towards Goal  Goal: Nutrition/Diet  Outcome: Progressing Towards Goal  Goal: Discharge Planning  Outcome: Progressing Towards Goal  Goal: Medications  Outcome: Progressing Towards Goal  Goal: Respiratory  Outcome: Progressing Towards Goal  Goal: Treatments/Interventions/Procedures  Outcome: Progressing Towards Goal  Goal: Psychosocial  Outcome: Progressing Towards Goal     Problem: Heart Failure: Discharge Outcomes  Goal: *Demonstrates ability to perform prescribed activity without shortness of breath or discomfort  Outcome: Progressing Towards Goal  Goal: *Left ventricular function assessment completed prior to or during stay, or planned for post-discharge  Outcome: Progressing Towards Goal  Goal: *ACEI prescribed if LVEF less than 40% and no contraindications or ARB prescribed  Outcome: Progressing Towards Goal  Goal: *Verbalizes understanding and describes prescribed diet  Outcome: Progressing Towards Goal  Goal: *Verbalizes understanding/describes prescribed medications  Outcome: Progressing Towards Goal  Goal: *Describes available resources and support systems  Description: (eg: Home Health, Palliative Care, Advanced Medical Directive)  Outcome: Progressing Towards Goal  Goal: *Describes smoking cessation resources  Outcome: Progressing Towards Goal  Goal: *Understands and describes signs and symptoms to report to providers(Stroke Metric)  Outcome: Progressing Towards Goal  Goal: *Describes/verbalizes understanding of follow-up/return appt  Description: (eg: to physicians, diabetes treatment coordinator, and other resources  Outcome: Progressing Towards Goal  Goal: *Describes importance of continuing daily weights and changes to report to physician  Outcome: Progressing Towards Goal

## 2022-08-11 NOTE — PROGRESS NOTES
Problem: Voice Impaired (Adult)  Goal: *Acute Goals and Plan of Care (Insert Text)  Description: Speech Therapy Goals  Initiated 8/11/22    1) Pt will tolerate the speaking valve without adverse effects within 7 days. Outcome: Progressing Towards Goal      SPEECH LANGUAGE PATHOLOGY IN-LINE PMV EVALUATION  Patient: Izabella Peoples (37 y.o. male)  Date: 8/11/2022  Primary Diagnosis: YADY (acute kidney injury) ([facility] [address] [N17.9]       Precautions:        ASSESSMENT :  Patient seen with RT May for in-line PMV after confirming on FEES that patient with a patent airway. Pt tolerated cuff deflated without any difficulty. Did not require any suctioning. In-line PMV placed without incident. Pt with excellent, strong voicing. Pt tolerated for 30 minutes. O2 stayed stable at 100%, RR was between 13 and 18, and HR stayed around 82. No evidence of back pressure/breath stacking indicative of CO2 retention. Recommend pt utilize this as tolerated to work toward weaning. Patient will benefit from skilled intervention to address the above impairments. Patients rehabilitation potential is considered to be Good     PLAN :  Recommendations and Planned Interventions:  See FEES note for diet recommendations. Speaking Valve Placement:  SLP / RT / RN only  Recommended Speaking Valve Wearing Schedule:  [x]  As tolerated  Frequency/Duration: Patient will be followed by speech-language pathology 4 times a week to address goals. Discharge Recommendations: Inpatient Rehab     SUBJECTIVE:   Patient stated, \"I'm doing good!   Pt very excited to have a voice. OBJECTIVE:   No past medical history on file. No past surgical history on file.   Prior Level of Function/Home Situation:   Home Situation  Support Systems: Other Family Member(s)  Patient Expects to be Discharged to[de-identified] [address] [facility]     Mental Status  Neurologic State: Alert  Orientation Level:  (mouths words, unable to verbalize)  Cognition: Follows commands Airway Clearance  Suction: Trach  Suction Device: Inline suction catheter  Sputum Method Obtained: Tracheal  Sputum Amount: Small  Sputum Color/Odor: White  Sputum Consistency: Thin;Thick        Oxygen Therapy  O2 Sat (%): 100 %  Pulse via Oximetry: 84 beats per minute  O2 Device: Tracheostomy; Ventilator  Skin Assessment: Clean, dry, & intact  Skin Protection for O2 Device: Yes  Orientation: Anterior  Location: Neck  Interventions: Mouth Care  FIO2 (%): 40 %  Airway Clearance  Suction: Trach  Suction Device: Inline suction catheter  Sputum Method Obtained: Tracheal  Sputum Amount: Small  Sputum Color/Odor: White  Sputum Consistency: Thin;Thick    NOMS:  The NOMS functional outcome measure was used to quantify this patient's level of voice  impairment. Based on the NOMS, the patient was determined to be at level 6 for voice function. NOMS Voice:  Level 1 (CN): Unable to use voice to communicate. Needs AAC. Level 2 (CM): Voice not functional most of time. Level 3 (CL): Voice functional but distracting & interferes with communication. Participation in activities is limited most of time. Level 4 (CK): Voice is functional and sometimes distracting. High vocal demand consistently impacted. Level 5 (CJ): Voice occasionally sounds normal with self monitoring; high vocal demand occasionally impacted. Level 6 (CI): Voice sounds normal across most situations. High vocal demand rarely impacted. Level 7 ([address] [place] [address]): Voice is normal and self monitoring is effective but only occasionally needed. KOMAL. (2003). National Outcomes Measurement System (NOMS): Adult Speech-Language Pathology User's Guide. Pain:  Pain Scale 1: Numeric (0 - 10)  Pain Intensity 1: 0  Pain Location 1: Back; Coccyx    After treatment:   Call bell within reach and Nursing notified    COMMUNICATION/EDUCATION:     The patient's plan of care including recommendations, planned interventions, and recommended diet changes were discussed with: Registered nurse, RT, MD.     Patient/family have participated as able in goal setting and plan of care.     Thank you for this referral.  Anisha Bonilla, TUSHAR  Time Calculation: 60 mins

## 2022-08-11 NOTE — WOUND CARE
Wound Care follow up:    Met Dr. Allan Patel and Arnaud Levine NP at bedside for debridement of sacrum, left hip and right lateral lower leg. Dr. Allan Patel took wound culture from right lateral lower leg. Wound beds look much better, will use Santyl ointment to finish off debridement. Sacrum, bilateral hip, bilateral ischial and right lateral lower leg dressings were changed. Moistened VASHE loosely filled wounds, covered with ABD pads and dressing were secured. Recommendations:    Continue with current wound care except sacrum, left hip and right lateral lower leg will also have Santyl ointment applied. Right hip and bilateral ischials- Daily cleanse with VASHE (blue bottle in room), moisten roll gauze with VASHE and loosely fill wounds and cover. Sacrum, left hip and right lateral lower leg- Daily cleanse with VASHE (blue bottle in room), apply nickel thickness of Santyl to areas of slough (tan/brown tissue), moisten roll gauze with VASHE and loosely fill wounds and cover. Left heel- Every other day paint with Betadine     Left lateral lower leg and right medial knee- Please maintain Exuderm Hydrocolloid up to one week or change as needed with soiling or rolled edges. Please use adhesive remover wipes when changing. Skin Care & Pressure Prevention:  Minimize layers of linen/pads under patient to optimize support surface. Turn/reposition approximately every 2 hours and offload heels.   Manage incontinence / promote continence  Nourishing Skin Cream to dry skin, minimize use of briefs when able     Discussed above plan with patient & Sue Neal RN     Transition of Care: Plan to follow as needed while admitted to hospital.     RAYA Guerra, RN, [facility]  Certified Wound and Ostomy Nurse  office [phone]  Best way to contact me is through [address] [place] [address]

## 2022-08-11 NOTE — DIALYSIS
CRRT / [phone]           Orders   Mode: CVVH    Blood Flow Rate: 200 ml/min   Prismasol Dose: 1,500 ml/hr  PBP: 750 ml/hr  Replacement: 750 ml/hr   Prismasol Concentrate: 4K / 2.5Ca   Blood Warmer Temp: 37*C   Net Fluid Removal: 0 ml/hr            Metrics   BP: 114/73   HR: 81   Access Pressure: -111   Filter Pressure: 125   Return Pressure: 43   TMP: 53   Pressure Drop: 54            Access   Type & Location: RIJ tunneled CVC: tegaderm dressing with CHG patch C/D/I, dated 8/10/22. Lines reversed. Labs   HBsAg (Antigen) / date: Negative 8/9/22                 HBsAb (Antibody) / date: Susceptible 8/9/22   Source: [facility]            Safety:   Time Out Done:   0430   Consent obtained/signed: Verified   Primary Nurse Rpt: Laura Robertson RN      Comments / Plan:   Code status, labs, notes and orders reviewed. In at bedside to assess filter, no indication for change, running well at this time. Lines reversed, visible and connections secure with blood warmer supported on return line and set at 37*C. Heparin infusing @ 500 units/hr. Education & pre/post to primary RN.

## 2022-08-12 LAB
ALBUMIN SERPL-MCNC: 2 G/DL (ref 3.5–5)
ALBUMIN SERPL-MCNC: 2.3 G/DL (ref 3.5–5)
ANION GAP SERPL CALC-SCNC: 5 MMOL/L (ref 5–15)
ANION GAP SERPL CALC-SCNC: 5 MMOL/L (ref 5–15)
BUN SERPL-MCNC: 18 MG/DL (ref 6–20)
BUN SERPL-MCNC: 21 MG/DL (ref 6–20)
BUN/CREAT SERPL: 20 (ref 12–20)
BUN/CREAT SERPL: 21 (ref 12–20)
CALCIUM SERPL-MCNC: 8 MG/DL (ref 8.5–10.1)
CALCIUM SERPL-MCNC: 8.2 MG/DL (ref 8.5–10.1)
CHLORIDE SERPL-SCNC: 104 MMOL/L (ref 97–108)
CHLORIDE SERPL-SCNC: 105 MMOL/L (ref 97–108)
CO2 SERPL-SCNC: 28 MMOL/L (ref 21–32)
CO2 SERPL-SCNC: 28 MMOL/L (ref 21–32)
CREAT SERPL-MCNC: 0.92 MG/DL (ref 0.7–1.3)
CREAT SERPL-MCNC: 0.98 MG/DL (ref 0.7–1.3)
ERYTHROCYTE [DISTWIDTH] IN BLOOD BY AUTOMATED COUNT: 14.6 % (ref 11.5–14.5)
GLUCOSE BLD STRIP.AUTO-MCNC: 154 MG/DL (ref 65–117)
GLUCOSE BLD STRIP.AUTO-MCNC: 163 MG/DL (ref 65–117)
GLUCOSE SERPL-MCNC: 160 MG/DL (ref 65–100)
GLUCOSE SERPL-MCNC: 161 MG/DL (ref 65–100)
HCT VFR BLD AUTO: 26.2 % (ref 36.6–50.3)
HGB BLD-MCNC: 8.1 G/DL (ref 12.1–17)
MAGNESIUM SERPL-MCNC: 2.2 MG/DL (ref 1.6–2.4)
MAGNESIUM SERPL-MCNC: 2.2 MG/DL (ref 1.6–2.4)
MCH RBC QN AUTO: 27.6 PG (ref 26–34)
MCHC RBC AUTO-ENTMCNC: 30.9 G/DL (ref 30–36.5)
MCV RBC AUTO: 89.1 FL (ref 80–99)
NRBC # BLD: 0 K/UL (ref 0–0.01)
NRBC BLD-RTO: 0 PER 100 WBC
PHOSPHATE SERPL-MCNC: 2.2 MG/DL (ref 2.6–4.7)
PHOSPHATE SERPL-MCNC: 2.2 MG/DL (ref 2.6–4.7)
PLATELET # BLD AUTO: 128 K/UL (ref 150–400)
PMV BLD AUTO: 11.3 FL (ref 8.9–12.9)
POTASSIUM SERPL-SCNC: 3.4 MMOL/L (ref 3.5–5.1)
POTASSIUM SERPL-SCNC: 3.5 MMOL/L (ref 3.5–5.1)
PROCALCITONIN SERPL-MCNC: 1.89 NG/ML
RBC # BLD AUTO: 2.94 M/UL (ref 4.1–5.7)
SERVICE CMNT-IMP: ABNORMAL
SERVICE CMNT-IMP: ABNORMAL
SODIUM SERPL-SCNC: 137 MMOL/L (ref 136–145)
SODIUM SERPL-SCNC: 138 MMOL/L (ref 136–145)
VANCOMYCIN TROUGH SERPL-MCNC: 17.8 UG/ML (ref 5–10)
WBC # BLD AUTO: 8.8 K/UL (ref 4.1–11.1)

## 2022-08-12 PROCEDURE — 97161 PT EVAL LOW COMPLEX 20 MIN: CPT

## 2022-08-12 PROCEDURE — 97110 THERAPEUTIC EXERCISES: CPT

## 2022-08-12 PROCEDURE — 74011250636 HC RX REV CODE- 250/636: Performed by: STUDENT IN AN ORGANIZED HEALTH CARE EDUCATION/TRAINING PROGRAM

## 2022-08-12 PROCEDURE — P9047 ALBUMIN (HUMAN), 25%, 50ML: HCPCS | Performed by: INTERNAL MEDICINE

## 2022-08-12 PROCEDURE — 65620000000 HC RM CCU GENERAL

## 2022-08-12 PROCEDURE — 36430 TRANSFUSION BLD/BLD COMPNT: CPT

## 2022-08-12 PROCEDURE — 85027 COMPLETE CBC AUTOMATED: CPT

## 2022-08-12 PROCEDURE — 82962 GLUCOSE BLOOD TEST: CPT

## 2022-08-12 PROCEDURE — 74011636637 HC RX REV CODE- 636/637: Performed by: NURSE PRACTITIONER

## 2022-08-12 PROCEDURE — 74011000250 HC RX REV CODE- 250: Performed by: INTERNAL MEDICINE

## 2022-08-12 PROCEDURE — 74011250636 HC RX REV CODE- 250/636: Performed by: INTERNAL MEDICINE

## 2022-08-12 PROCEDURE — 80202 ASSAY OF VANCOMYCIN: CPT

## 2022-08-12 PROCEDURE — 83735 ASSAY OF MAGNESIUM: CPT

## 2022-08-12 PROCEDURE — 80069 RENAL FUNCTION PANEL: CPT

## 2022-08-12 PROCEDURE — 74011250637 HC RX REV CODE- 250/637: Performed by: INTERNAL MEDICINE

## 2022-08-12 PROCEDURE — 97165 OT EVAL LOW COMPLEX 30 MIN: CPT

## 2022-08-12 PROCEDURE — P9016 RBC LEUKOCYTES REDUCED: HCPCS

## 2022-08-12 PROCEDURE — 74011000250 HC RX REV CODE- 250: Performed by: NURSE PRACTITIONER

## 2022-08-12 PROCEDURE — 84145 PROCALCITONIN (PCT): CPT

## 2022-08-12 PROCEDURE — 97530 THERAPEUTIC ACTIVITIES: CPT

## 2022-08-12 PROCEDURE — 30233N1 TRANSFUSION OF NONAUTOLOGOUS RED BLOOD CELLS INTO PERIPHERAL VEIN, PERCUTANEOUS APPROACH: ICD-10-PCS | Performed by: STUDENT IN AN ORGANIZED HEALTH CARE EDUCATION/TRAINING PROGRAM

## 2022-08-12 PROCEDURE — 2709999900 HC NON-CHARGEABLE SUPPLY

## 2022-08-12 PROCEDURE — 94003 VENT MGMT INPAT SUBQ DAY: CPT

## 2022-08-12 PROCEDURE — 90945 DIALYSIS ONE EVALUATION: CPT

## 2022-08-12 PROCEDURE — 74011250637 HC RX REV CODE- 250/637: Performed by: STUDENT IN AN ORGANIZED HEALTH CARE EDUCATION/TRAINING PROGRAM

## 2022-08-12 PROCEDURE — 36415 COLL VENOUS BLD VENIPUNCTURE: CPT

## 2022-08-12 PROCEDURE — 74011000250 HC RX REV CODE- 250: Performed by: SURGERY

## 2022-08-12 PROCEDURE — 99231 SBSQ HOSP IP/OBS SF/LOW 25: CPT | Performed by: NURSE PRACTITIONER

## 2022-08-12 PROCEDURE — 74011250637 HC RX REV CODE- 250/637: Performed by: NURSE PRACTITIONER

## 2022-08-12 PROCEDURE — 74011000258 HC RX REV CODE- 258: Performed by: STUDENT IN AN ORGANIZED HEALTH CARE EDUCATION/TRAINING PROGRAM

## 2022-08-12 RX ADMIN — HYDROCODONE BITARTRATE AND ACETAMINOPHEN 1 TABLET: 10; 325 TABLET ORAL at 17:05

## 2022-08-12 RX ADMIN — CHOLESTYRAMINE 4 G: 4 POWDER, FOR SUSPENSION ORAL at 12:19

## 2022-08-12 RX ADMIN — MEXILETINE HYDROCHLORIDE 150 MG: 150 CAPSULE ORAL at 15:19

## 2022-08-12 RX ADMIN — MEXILETINE HYDROCHLORIDE 150 MG: 150 CAPSULE ORAL at 03:46

## 2022-08-12 RX ADMIN — VANCOMYCIN HYDROCHLORIDE 750 MG: 750 INJECTION, POWDER, LYOPHILIZED, FOR SOLUTION INTRAVENOUS at 17:05

## 2022-08-12 RX ADMIN — CALCIUM CHLORIDE, MAGNESIUM CHLORIDE, DEXTROSE MONOHYDRATE, LACTIC ACID, SODIUM CHLORIDE, SODIUM BICARBONATE AND POTASSIUM CHLORIDE 1500 ML/HR: 3.68; 3.05; 22; 5.4; 6.46; 3.09; .314 INJECTION INTRAVENOUS at 07:58

## 2022-08-12 RX ADMIN — COLLAGENASE SANTYL: 250 OINTMENT TOPICAL at 09:36

## 2022-08-12 RX ADMIN — Medication 10 ML: at 15:15

## 2022-08-12 RX ADMIN — CHLORHEXIDINE GLUCONATE 15 ML: 1.2 RINSE ORAL at 09:38

## 2022-08-12 RX ADMIN — SODIUM PHOSPHATE, MONOBASIC, MONOHYDRATE AND SODIUM PHOSPHATE, DIBASIC, ANHYDROUS: 276; 142 INJECTION, SOLUTION INTRAVENOUS at 08:02

## 2022-08-12 RX ADMIN — CHLORHEXIDINE GLUCONATE 15 ML: 1.2 RINSE ORAL at 21:20

## 2022-08-12 RX ADMIN — CALCIUM CHLORIDE, MAGNESIUM CHLORIDE, DEXTROSE MONOHYDRATE, LACTIC ACID, SODIUM CHLORIDE, SODIUM BICARBONATE AND POTASSIUM CHLORIDE: 3.68; 3.05; 22; 5.4; 6.46; 3.09; .314 INJECTION INTRAVENOUS at 16:12

## 2022-08-12 RX ADMIN — HYDROMORPHONE HYDROCHLORIDE 0.5 MG: 1 INJECTION, SOLUTION INTRAMUSCULAR; INTRAVENOUS; SUBCUTANEOUS at 21:20

## 2022-08-12 RX ADMIN — MIDODRINE HYDROCHLORIDE 15 MG: 5 TABLET ORAL at 21:20

## 2022-08-12 RX ADMIN — ALBUMIN (HUMAN) 25 G: 0.25 INJECTION, SOLUTION INTRAVENOUS at 15:19

## 2022-08-12 RX ADMIN — PIPERACILLIN AND TAZOBACTAM 3.38 G: 3; .375 INJECTION, POWDER, LYOPHILIZED, FOR SOLUTION INTRAVENOUS at 06:31

## 2022-08-12 RX ADMIN — CALCIUM CHLORIDE, MAGNESIUM CHLORIDE, DEXTROSE MONOHYDRATE, LACTIC ACID, SODIUM CHLORIDE, SODIUM BICARBONATE AND POTASSIUM CHLORIDE: 3.68; 3.05; 22; 5.4; 6.46; 3.09; .314 INJECTION INTRAVENOUS at 21:25

## 2022-08-12 RX ADMIN — MIDODRINE HYDROCHLORIDE 15 MG: 5 TABLET ORAL at 05:08

## 2022-08-12 RX ADMIN — FLUDROCORTISONE ACETATE 0.1 MG: 0.1 TABLET ORAL at 09:36

## 2022-08-12 RX ADMIN — CALCIUM CHLORIDE, MAGNESIUM CHLORIDE, DEXTROSE MONOHYDRATE, LACTIC ACID, SODIUM CHLORIDE, SODIUM BICARBONATE AND POTASSIUM CHLORIDE: 3.68; 3.05; 22; 5.4; 6.46; 3.09; .314 INJECTION INTRAVENOUS at 14:39

## 2022-08-12 RX ADMIN — MIDODRINE HYDROCHLORIDE 15 MG: 5 TABLET ORAL at 15:14

## 2022-08-12 RX ADMIN — CALCIUM CHLORIDE, MAGNESIUM CHLORIDE, DEXTROSE MONOHYDRATE, LACTIC ACID, SODIUM CHLORIDE, SODIUM BICARBONATE AND POTASSIUM CHLORIDE 1500 ML/HR: 3.68; 3.05; 22; 5.4; 6.46; 3.09; .314 INJECTION INTRAVENOUS at 07:56

## 2022-08-12 RX ADMIN — HYDROMORPHONE HYDROCHLORIDE 0.5 MG: 1 INJECTION, SOLUTION INTRAMUSCULAR; INTRAVENOUS; SUBCUTANEOUS at 03:13

## 2022-08-12 RX ADMIN — MINERAL SUPPLEMENT IRON 300 MG / 5 ML STRENGTH LIQUID 100 PER BOX UNFLAVORED 300 MG: at 12:19

## 2022-08-12 RX ADMIN — APIXABAN 5 MG: 5 TABLET, FILM COATED ORAL at 15:14

## 2022-08-12 RX ADMIN — CHOLESTYRAMINE 4 G: 4 POWDER, FOR SUSPENSION ORAL at 17:05

## 2022-08-12 RX ADMIN — CALCIUM CHLORIDE, MAGNESIUM CHLORIDE, DEXTROSE MONOHYDRATE, LACTIC ACID, SODIUM CHLORIDE, SODIUM BICARBONATE AND POTASSIUM CHLORIDE: 3.68; 3.05; 22; 5.4; 6.46; 3.09; .314 INJECTION INTRAVENOUS at 21:34

## 2022-08-12 RX ADMIN — Medication 10 ML: at 21:20

## 2022-08-12 RX ADMIN — CALCIUM CHLORIDE, MAGNESIUM CHLORIDE, DEXTROSE MONOHYDRATE, LACTIC ACID, SODIUM CHLORIDE, SODIUM BICARBONATE AND POTASSIUM CHLORIDE 1500 ML/HR: 3.68; 3.05; 22; 5.4; 6.46; 3.09; .314 INJECTION INTRAVENOUS at 07:55

## 2022-08-12 RX ADMIN — POLYETHYLENE GLYCOL 3350 17 G: 17 POWDER, FOR SOLUTION ORAL at 23:24

## 2022-08-12 RX ADMIN — Medication 10 ML: at 06:31

## 2022-08-12 RX ADMIN — CALCIUM CHLORIDE, MAGNESIUM CHLORIDE, DEXTROSE MONOHYDRATE, LACTIC ACID, SODIUM CHLORIDE, SODIUM BICARBONATE AND POTASSIUM CHLORIDE: 3.68; 3.05; 22; 5.4; 6.46; 3.09; .314 INJECTION INTRAVENOUS at 15:32

## 2022-08-12 RX ADMIN — MINERAL SUPPLEMENT IRON 300 MG / 5 ML STRENGTH LIQUID 100 PER BOX UNFLAVORED 300 MG: at 17:05

## 2022-08-12 RX ADMIN — LEVOTHYROXINE SODIUM 50 MCG: 0.05 TABLET ORAL at 05:08

## 2022-08-12 RX ADMIN — MINERAL SUPPLEMENT IRON 300 MG / 5 ML STRENGTH LIQUID 100 PER BOX UNFLAVORED 300 MG: at 09:36

## 2022-08-12 RX ADMIN — HYDROMORPHONE HYDROCHLORIDE 0.5 MG: 1 INJECTION, SOLUTION INTRAMUSCULAR; INTRAVENOUS; SUBCUTANEOUS at 14:44

## 2022-08-12 RX ADMIN — ATORVASTATIN CALCIUM 40 MG: 40 TABLET, FILM COATED ORAL at 21:20

## 2022-08-12 RX ADMIN — CALCIUM CHLORIDE, MAGNESIUM CHLORIDE, DEXTROSE MONOHYDRATE, LACTIC ACID, SODIUM CHLORIDE, SODIUM BICARBONATE AND POTASSIUM CHLORIDE 1500 ML/HR: 3.68; 3.05; 22; 5.4; 6.46; 3.09; .314 INJECTION INTRAVENOUS at 01:12

## 2022-08-12 RX ADMIN — CHOLESTYRAMINE 4 G: 4 POWDER, FOR SUSPENSION ORAL at 09:36

## 2022-08-12 RX ADMIN — HEPARIN SODIUM AND DEXTROSE 500 UNITS/HR: 10000; 5 INJECTION INTRAVENOUS at 02:07

## 2022-08-12 RX ADMIN — APIXABAN 5 MG: 5 TABLET, FILM COATED ORAL at 03:19

## 2022-08-12 RX ADMIN — HYDROCODONE BITARTRATE AND ACETAMINOPHEN 1 TABLET: 10; 325 TABLET ORAL at 23:28

## 2022-08-12 RX ADMIN — CALCIUM CHLORIDE, MAGNESIUM CHLORIDE, DEXTROSE MONOHYDRATE, LACTIC ACID, SODIUM CHLORIDE, SODIUM BICARBONATE AND POTASSIUM CHLORIDE: 3.68; 3.05; 22; 5.4; 6.46; 3.09; .314 INJECTION INTRAVENOUS at 21:26

## 2022-08-12 RX ADMIN — Medication 5 UNITS: at 09:37

## 2022-08-12 RX ADMIN — PIPERACILLIN AND TAZOBACTAM 3.38 G: 3; .375 INJECTION, POWDER, LYOPHILIZED, FOR SOLUTION INTRAVENOUS at 15:14

## 2022-08-12 RX ADMIN — PIPERACILLIN AND TAZOBACTAM 3.38 G: 3; .375 INJECTION, POWDER, LYOPHILIZED, FOR SOLUTION INTRAVENOUS at 23:24

## 2022-08-12 RX ADMIN — ALBUMIN (HUMAN) 25 G: 0.25 INJECTION, SOLUTION INTRAVENOUS at 05:08

## 2022-08-12 NOTE — PROGRESS NOTES
General Surgery Daily Progress Note    Admit Date: 2022  Post-Operative Day: * No surgery found * from * No surgery found *     Subjective:     Last 24 hrs: pt restless; pulling at trach - WBC nl, on abx      Objective:     Blood pressure 105/66, pulse 99, temperature 97 °F (36.1 °C), resp. rate 20, height 5' 8\" (1.727 m), weight 147 lb 0.8 oz (66.7 kg), SpO2 99 %. Temp (24hrs), Av °F (35.6 °C), Min:93.5 °F (34.2 °C), Max:97.2 °F (36.2 °C)      _____________________  Physical Exam:     Alert and restless,   Cardiovascular: RRR, no peripheral edema  Sacrum not examined      Assessment:   Active Problems:    [facility]            Plan:     Cont local wound care  Will reassess Monday w/ wound care nurse  Cont abx    Data Review:    Recent Labs     22  0436 22  1603 22  0746 22  0400   WBC 8.8 7.3  --  6.7   HGB 8.1* 6.2* 7.1* 6.6*   HCT 26.2* 19.6* 22.9* 21.4*   * 142*  --  162     Recent Labs     22  0436 22  1603 22  0400    140 140   K 3.5 3.1* 3.5    106 106   CO2 28 28 26   * 108* 144*   BUN 21* 23* 28*   CREA 0.98 1.12 1.39*   CA 8.2* 8.1* 8.1*   MG 2.2 2.0 2.2   PHOS 2.2* 2.6 1.9*   ALB 2.0* 1.6* 0.9*     No results for input(s): AML, LPSE in the last 72 hours. ______________________  Medications:    Current Facility-Administered Medications   Medication Dose Route Frequency    Vancomycin - trough due  @ 1700 PRIOR to next vanc dose. RN please draw. Thanks!    Other ONCE    albumin human 25% (BUMINATE) solution 25 g  25 g IntraVENous Q8H    midodrine (PROAMATINE) tablet 15 mg  15 mg Per G Tube Q8H    epoetin darius-epbx (RETACRIT) injection 10,000 Units  10,000 Units SubCUTAneous Q7D    HYDROcodone-acetaminophen (NORCO)  mg tablet 1 Tablet  1 Tablet Oral Q6H PRN    HYDROmorphone (DILAUDID) injection 0.5 mg  0.5 mg IntraVENous Q6H PRN    0.9% sodium chloride infusion 250 mL  250 mL IntraVENous PRN    collagenase (SANTYL) 250 unit/gram ointment   Topical DAILY    vancomycin (VANCOCIN) 750 mg in 0.9% sodium chloride 250 mL ([id number])  750 mg IntraVENous Q24H    bicarbonate dialysis (PRISMASOL) BG K 4/Ca 2.5 5000 ml solution   Extracorporeal DIALYSIS CONTINUOUS    alteplase (CATHFLO) 1 mg in sterile water (preservative free) 1 mL injection  1 mg InterCATHeter PRN    apixaban (ELIQUIS) tablet 5 mg  5 mg Oral Q12H    atorvastatin (LIPITOR) tablet 40 mg  40 mg Oral QHS    fludrocortisone (FLORINEF) tablet 0.1 mg  0.1 mg Oral DAILY    insulin glargine (LANTUS) injection 5 Units  5 Units SubCUTAneous DAILY    [Held by provider] sacubitriL-valsartan (ENTRESTO) 24-26 mg tablet 0.5 Tablet  0.5 Tablet Oral Q12H    ferrous sulfate 300 mg (60 mg iron)/5 mL oral syrup 300 mg  300 mg Oral TID WITH MEALS    mexiletine (MEXITIL) capsule 150 mg  150 mg Oral Q12H    cholestyramine-aspartame (QUESTRAN LIGHT) packet 4 g  4 g Oral TID WITH MEALS    levothyroxine (SYNTHROID) tablet 50 mcg  50 mcg Oral 6am    albuterol-ipratropium (DUO-NEB) 2.5 MG-0.5 MG/3 ML  3 mL Nebulization Q4H PRN    heparin (porcine) 1,000 unit/mL injection 1,400 Units  1,400 Units InterCATHeter DIALYSIS PRN    And    heparin (porcine) 1,000 unit/mL injection 1,400 Units  1,400 Units InterCATHeter DIALYSIS PRN    Vancomycin - pharmacy to dose   Other Rx Dosing/Monitoring    NOREPINephrine (LEVOPHED) 8 mg in 5% dextrose 250mL (32 mcg/mL) infusion  0.5-30 mcg/min IntraVENous TITRATE    piperacillin-tazobactam (ZOSYN) 3.375 g in 0.9% sodium chloride (MBP/ADV) 100 mL MBP  3.375 g IntraVENous Q8H    heparin 25,000 units in D5W 250 ml infusion  500 Units/hr IntraVENous CONTINUOUS    sodium chloride (NS) flush 5-40 mL  5-40 mL IntraVENous Q8H    sodium chloride (NS) flush 5-40 mL  5-40 mL IntraVENous PRN    acetaminophen (TYLENOL) tablet 650 mg  650 mg Oral Q6H PRN    Or    acetaminophen (TYLENOL) suppository 650 mg  650 mg Rectal Q6H PRN polyethylene glycol (MIRALAX) packet 17 g  17 g Oral DAILY PRN    ondansetron (ZOFRAN ODT) tablet 4 mg  4 mg Oral Q8H PRN    Or    ondansetron (ZOFRAN) injection 4 mg  4 mg IntraVENous Q6H PRN    chlorhexidine (PERIDEX) 0.12 % mouthwash 15 mL  15 mL Oral Q12H       oFzia Dodge NP  8/12/2022        I have independently examined the patient and have reviewed the chart. I agree with the above plan. Check wound early next week.     Adri Perales MD

## 2022-08-12 NOTE — PROGRESS NOTES
SLP Contact Note    Attempted to see patient for treatment. Patient sleeping soundly in bed on TCT after being restless and pulling trach this AM. Therefore will defer and follow up as patient available to participate.      Thank you,   FLAKITA Marino Pathologist

## 2022-08-12 NOTE — PROGRESS NOTES
Name: Wilfrido Hammer MRN: [id number]   : 1980 [facility]   Date: 2022        IMPRESSION:   YADY, started on iHD initially. patient became hypotensive and was switched to CRRT. Hypotension on pressors  Hypophosphatemia  Debility with multiple pressure ulcers  Respiratory failure - on vent  Anemia of chronic disease. Hyperkalemia - resolved with RRT  Protein deficiency  wounds      PLAN:   Continue CRRT for now. Continue fluid removal at 50 ml/hr. Mr Alba Lopez remains oliguric and requires ongoing   pressor support. Wean pressors as tolerated. -IV albumin  -I Midodrine to 15 mg tid  Watch for GFR recovery. Prior to starting dialysis patient's Scr was 0.8-low urine output   Epo, IV Iron  Replete phos-.  Tube feeding  Poor overall prognosis. Dose meds for his GFR. Avoid NSAIDs + IV contrast.     Subjective/Interval History:   I have reviewed the flowsheet and previous days notes. Seen on CRRT, awake and alert, denies pain/SOB with nodding head      F/U - YADY , CRRT --> 2022    Remains on CRRT + pressors. No new complaints were offered. Objective:   Vital Signs:    Visit Vitals  /75   Pulse 89   Temp 97 °F (36.1 °C)   Resp 17   Ht 5' 8\" (1.727 m)   Wt 66.7 kg (147 lb 0.8 oz)   SpO2 99%   BMI 22.36 kg/m²       O2 Device: Tracheostomy, Ventilator       Temp (24hrs), Av.2 °F (35.7 °C), Min:93.5 °F (34.2 °C), Max:97.2 °F (36.2 °C)       Intake/Output:   Last shift:       07 - 1900  In: 403 [I.V.:223]  Out: 589   Last 3 shifts: 08/10 1901 -  0700  In: 4828.7 [I.V.:2181.7]  Out: 4845 [Urine:265]    Intake/Output Summary (Last 24 hours) at 2022 1048  Last data filed at 2022 1000  Gross per 24 hour   Intake 3703.17 ml   Output 3385 ml   Net 318.17 ml          Physical Exam:      Seen in CCU - Bed 25. CVVH was in progress during my visit. Members of her care team were in attendance. General:    Alert.     Head:   Normocephalic, bitemporal muscle wasting. Eyes:   Conjunctivae/corneas clear. Neck:  Trach    Lungs:   Clear to auscultation, no wheezes, no rales. Heart:   No S 3 gallop, no pericardial rub  . Abdomen:   Not distended. Extremities: Muscle wasting --> 1 to 2 +                          Leg oedema -> mild     Skin:     Eschar - heel. Psych:  Not anxious or agitated. Neurologic: Responding to questions appropriately  . DATA:  Labs:  Recent Labs     08/12/22  0436 08/11/22  1603 08/11/22  0400    140 140   K 3.5 3.1* 3.5    106 106   CO2 28 28 26   BUN 21* 23* 28*   CREA 0.98 1.12 1.39*   CA 8.2* 8.1* 8.1*   ALB 2.0* 1.6* 0.9*   PHOS 2.2* 2.6 1.9*   MG 2.2 2.0 2.2       Recent Labs     08/12/22  0436 08/11/22  1603 08/11/22  0746 08/11/22  0400   WBC 8.8 7.3  --  6.7   HGB 8.1* 6.2* 7.1* 6.6*   HCT 26.2* 19.6* 22.9* 21.4*   * 142*  --  162       No results for input(s): YAMIL, KU, CLU, CREAU in the last 72 hours.     No lab exists for component: PROU    Total time spent with patient:           Care Plan discussed with:    ICU Attending    Ryland Gonzalez MD

## 2022-08-12 NOTE — PROGRESS NOTES
Patient pulled the whole trach, RT put it back. O2 Sat was  90%, and no respiratory distress. Set up new trach collar 40% 10 L.

## 2022-08-12 NOTE — PROGRESS NOTES
Problem: Self Care Deficits Care Plan (Adult)  Goal: *Acute Goals and Plan of Care (Insert Text)  Description: FUNCTIONAL STATUS PRIOR TO ADMISSION: pt unclear historian, trach and vent. Pt admitted from [facility]. Per chart, prior to start of 2022, pt was living alone with care aides to assist with ADLs. Pt shook head yes/no to questions, reported he was able to transfer himself to power w/c via sliding board. Pt with hx of paraplegia? States he can feel bowel and bladder. HOME SUPPORT: lives alone, care aides daily to assist with ADLs at baseline. Pt admitted to [facility] 2* trach and vent dependent     Occupational Therapy Goals  Initiated 8/12/2022  1. Patient will perform self-feeding with supervision/set-up within 7 day(s). 2.  Patient will perform upper body dressing with moderate assistance  within 7 day(s). 3.  Patient will perform rolling in bed in place for bed pan/toileting with mod A x 2 within 7 days. 4.  Patient will perform anterior bathing in supported sitting chest to thighs with minimal assist within 7 days. 5.  Patient will participate in upper extremity therapeutic exercise/activities with minimal assistance/contact guard assist for 10 minutes within 7 day(s). Outcome: Not Met     OCCUPATIONAL THERAPY EVALUATION  Patient: Caren Yanes (21 y.o. 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 Pt unclear historian but per chart, admitted from [facility], hx of PEA arrest and currently on CRRT. He nodded head yes/no appropriately to all questions.  L UE weaker against gravity compared to R, however, pt states he is L hand dominant. Will continue to follow for OT to address UE strengthening and ADLs as able. Current Level of Function Impacting Discharge (ADLs/self-care): mod to total A    Functional Outcome Measure: The patient scored Total: 15/100 on the Barthel Index outcome measure       Other factors to consider for discharge: from [address]      Patient will benefit from skilled therapy intervention to address the above noted impairments. PLAN :  Recommendations and Planned Interventions: self care training, balance training, therapeutic activities, and endurance activities    Frequency/Duration: Patient will be followed by occupational therapy 3 times a week to address goals. Recommendation for discharge: (in order for the patient to meet his/her long term goals)  TBD    This discharge recommendation:  Has not yet been discussed the attending provider and/or case management    IF patient discharges home will need the following DME: tbd       SUBJECTIVE:   Patient shook head yes/no, attempted to mouth words  OBJECTIVE DATA SUMMARY:   HISTORY:   No past medical history on file. No past surgical history on file. Expanded or extensive additional review of patient history:     Home Situation  Home Environment: Long term care  Support Systems: Other Family Member(s)  Patient Expects to be Discharged to[de-identified] [facility] ([facility])    Hand dominance: Left    EXAMINATION OF PERFORMANCE DEFICITS:  Cognitive/Behavioral Status:  Neurologic State: Alert  Orientation Level: Unable to verbalize  Cognition: Follows commands  Perception: Appears intact  Perseveration: No perseveration noted  Safety/Judgement: Insight into deficits    Skin: pressure ulcers, dry skin.  Floated B heels in Prevalon boots    Edema:     Hearing:       Vision/Perceptual:                           Acuity: Within Defined Limits         Range of Motion:    AROM: Grossly decreased, non-functional  PROM: Grossly decreased, non-functional                      Strength:    Strength: Grossly decreased, non-functional                Coordination:  Coordination: Generally decreased, functional  Fine Motor Skills-Upper: Left Impaired    Gross Motor Skills-Upper: Left Impaired;Right Intact    Tone & Sensation:    Tone: Abnormal (hypotonic bilateral LEs - paraplegic)  Sensation: Impaired (bilateral LEs)                      Balance:       Functional Mobility and Transfers for ADLs:  Bed Mobility:  Rolling: Maximum assistance;Assist x2 (inferred)    Transfers:       ADL Assessment:  Feeding: Moderate assistance    Oral Facial Hygiene/Grooming: Minimum assistance    Bathing: Maximum assistance         Upper Body Dressing: Maximum assistance    Lower Body Dressing: Total assistance    Toileting: Total assistance                ADL Intervention and task modifications:                                          Cognitive Retraining  Safety/Judgement: Insight into deficits      Functional Measure:    Barthel Index:  Bathin  Bladder: 0  Bowels: 5  Groomin  Dressin  Feeding: 10  Mobility: 0  Stairs: 0  Toilet Use: 0  Transfer (Bed to Chair and Back): 0  Total: 15/100      The Barthel ADL Index: Guidelines  1. The index should be used as a record of what a patient does, not as a record of what a patient could do. 2. The main aim is to establish degree of independence from any help, physical or verbal, however minor and for whatever reason. 3. The need for supervision renders the patient not independent. 4. A patient's performance should be established using the best available evidence. Asking the patient, friends/relatives and nurses are the usual sources, but direct observation and common sense are also important. However direct testing is not needed. 5. Usually the patient's performance over the preceding 24-48 hours is important, but occasionally longer periods will be relevant.   6. Middle categories imply that the patient supplies over 50 per cent of the effort. 7. Use of aids to be independent is allowed. Score Interpretation (from [address] [facility])    Independent   60-79 Minimally independent   40-59 Partially dependent   20-39 Very dependent   <20 Totally dependent     -Syed Osei., Barthel, D.W. (1965). Functional evaluation: the Barthel Index. [address] ([address] (1997). The Barthel activities of daily living index: self-reporting versus actual performance in the old (> or = 75 years). Journal of [address] [place] [address] 45(7), 14 [facility], J.STEPHANIEF, Tyrone Crain., Ankush Chávez. (1999). Measuring the change in disability after inpatient rehabilitation; comparison of the responsiveness of the Barthel Index and Functional [place] Measure. Journal of Neurology, Neurosurgery, and Psychiatry, 66(4), 254-985. Faith Ortiz NSivanJ.A, STACI Hendrickson, & Filomena Sanders MSivanA. (2004) Assessment of post-stroke quality of life in cost-effectiveness studies: The usefulness of the Barthel Index and the EuroQoL-5D. Quality of Life Research, 15, 323-21     Occupational Therapy Evaluation Charge Determination   History Examination Decision-Making   HIGH Complexity : Extensive review of history including physical, cognitive and psychosocial history  HIGH Complexity : 5 or more performance deficits relating to physical, cognitive , or psychosocial skils that result in activity limitations and / or participation restrictions HIGH Complexity : Patient presents with comorbidities that affect occupational performance.  Signifigant modification of tasks or assistance (eg, physical or verbal) with assessment (s) is necessary to enable patient to complete evaluation       Based on the above components, the patient evaluation is determined to be of the following complexity level: HIGH   Pain Rating:  No pain with stretching     Activity Tolerance:   Fair- 86% and replaced trach collar over trach with SpO2 recovering >90%    After treatment patient left in no apparent distress:    Supine in bed, Heels elevated for pressure relief, Patient positioned in L sidelying for pressure relief, and Call bell within reach    COMMUNICATION/EDUCATION:   The patients plan of care was discussed with: Physical therapist and Registered nurse. Patient/family have participated as able in goal setting and plan of care. This patients plan of care is appropriate for delegation to JOHANNY.     Thank you for this referral.  Tirso Ruiz OT  Time Calculation: 20 mins

## 2022-08-12 NOTE — PROGRESS NOTES
Progress Note    Patient: Jose Hagen MRN: [id number]  SSN: [id number]    YOB: 1980  Age: 38 y.o. Sex: male      Admit Date: 2022    Sacral and leg ulcers    Subjective:     No acute surgical issues. Leukocytosis is resolving.     Objective:     Visit Vitals  /79   Pulse 75   Temp 97.2 °F (36.2 °C)   Resp 12   Ht 5' 8\" (1.727 m)   Wt 148 lb 8 oz (67.4 kg)   SpO2 98%   BMI 22.58 kg/m²       Temp (24hrs), Av.1 °F (36.2 °C), Min:96.8 °F (36 °C), Max:97.6 °F (36.4 °C)        Physical Exam:    Gen:  NAD  Pulm:  Unlabored  Sacral wounds:  fibrinous exudate and eschars with malodor  Right leg wound:  Eschar unroofed with purulent drainage    Recent Results (from the past 24 hour(s))   CBC W/O DIFF    Collection Time: 22  4:00 AM   Result Value Ref Range    WBC 6.7 4.1 - 11.1 K/uL    RBC 2.42 (L) 4.10 - 5.70 M/uL    HGB 6.6 (L) 12.1 - 17.0 g/dL    HCT 21.4 (L) 36.6 - 50.3 %    MCV 88.4 80.0 - 99.0 FL    MCH 27.3 26.0 - 34.0 PG    MCHC 30.8 30.0 - 36.5 g/dL    RDW 14.8 (H) 11.5 - 14.5 %    PLATELET 178 026 - 628 K/uL    MPV 11.1 8.9 - 12.9 FL    NRBC 0.0 0  WBC    ABSOLUTE NRBC 0.00 0.00 - 0.01 K/uL   MAGNESIUM    Collection Time: 22  4:00 AM   Result Value Ref Range    Magnesium 2.2 1.6 - 2.4 mg/dL   RENAL FUNCTION PANEL    Collection Time: 22  4:00 AM   Result Value Ref Range    Sodium 140 136 - 145 mmol/L    Potassium 3.5 3.5 - 5.1 mmol/L    Chloride 106 97 - 108 mmol/L    CO2 26 21 - 32 mmol/L    Anion gap 8 5 - 15 mmol/L    Glucose 144 (H) 65 - 100 mg/dL    BUN 28 (H) 6 - 20 MG/DL    Creatinine 1.39 (H) 0.70 - 1.30 MG/DL    BUN/Creatinine ratio 20 12 - 20      GFR est AA >60 >60 ml/min/1.73m2    GFR est non-AA 56 (L) >60 ml/min/1.73m2    Calcium 8.1 (L) 8.5 - 10.1 MG/DL    Phosphorus 1.9 (L) 2.6 - 4.7 MG/DL    Albumin 0.9 (L) 3.5 - 5.0 g/dL   PROCALCITONIN    Collection Time: 22  4:00 AM   Result Value Ref Range    Procalcitonin 3.38 ng/mL   TYPE & SCREEN Collection Time: 08/11/22  6:04 AM   Result Value Ref Range    Crossmatch Expiration 08/14/2022,2359     ABO/Rh(D) B POSITIVE     Antibody screen POS     Antibody ID NO ADDITIONAL ANTIBODIES DETECTED     TAYLOR Poly POS     Comment       Previously identified Anti Fya, Anti Jka, Anti S and Nonspecific Antibody    TAYLOR IgG POS     TAYLOR C3b/C3d NEG     Unit number [id number]     Blood component type RC LR     Unit division 00     Status of unit REL FROM ALLOC     ANTIGEN/ANTIBODY INFO FY(A) NEGATIVE,  Jk(A) NEGATIVE,  S NEGATIVE,       Crossmatch result Incompatible     Unit number [id number]     Blood component type RC LR     Unit division 00     Status of unit REL FROM ALLOC     ANTIGEN/ANTIBODY INFO FY(A) NEGATIVE,  Jk(A) NEGATIVE,  S NEGATIVE,       Crossmatch result Incompatible     Unit number [id number]     Blood component type RC LR,1     Unit division 00     Status of unit REL FROM ALLOC     ANTIGEN/ANTIBODY INFO FY(A) NEGATIVE,  Jk(A) NEGATIVE,  S NEGATIVE,       Crossmatch result Incompatible     Unit number [id number]     Blood component type RC LR,2     Unit division 00     Status of unit ALLOCATED     ANTIGEN/ANTIBODY INFO FY(A) NEGATIVE,  Jk(A) NEGATIVE,  S NEGATIVE,       Crossmatch result Compatible     Unit number [id number]     Blood component type RC LR,1     Unit division 00     Status of unit ISSUED     ANTIGEN/ANTIBODY INFO FY(A) NEGATIVE,  Jk(A) NEGATIVE,  S NEGATIVE,       Crossmatch result Compatible    HGB & HCT    Collection Time: 08/11/22  7:46 AM   Result Value Ref Range    HGB 7.1 (L) 12.1 - 17.0 g/dL    HCT 22.9 (L) 36.6 - 50.3 %   GLUCOSE, POC    Collection Time: 08/11/22  8:23 AM   Result Value Ref Range    Glucose (POC) 125 (H) 65 - 117 mg/dL    Performed by Ty Sawyer    CBC W/O DIFF    Collection Time: 08/11/22  4:03 PM   Result Value Ref Range    WBC 7.3 4.1 - 11.1 K/uL    RBC 2.22 (L) 4.10 - 5.70 M/uL    HGB 6.2 (L) 12.1 - 17.0 g/dL    HCT 19.6 (L) 36.6 - 50.3 % MCV 88.3 80.0 - 99.0 FL    MCH 27.9 26.0 - 34.0 PG    MCHC 31.6 30.0 - 36.5 g/dL    RDW 14.8 (H) 11.5 - 14.5 %    PLATELET 784 (L) 475 - 400 K/uL    MPV 11.6 8.9 - 12.9 FL    NRBC 0.0 0  WBC    ABSOLUTE NRBC 0.00 0.00 - 0.01 K/uL   RENAL FUNCTION PANEL    Collection Time: 08/11/22  4:03 PM   Result Value Ref Range    Sodium 140 136 - 145 mmol/L    Potassium 3.1 (L) 3.5 - 5.1 mmol/L    Chloride 106 97 - 108 mmol/L    CO2 28 21 - 32 mmol/L    Anion gap 6 5 - 15 mmol/L    Glucose 108 (H) 65 - 100 mg/dL    BUN 23 (H) 6 - 20 MG/DL    Creatinine 1.12 0.70 - 1.30 MG/DL    BUN/Creatinine ratio 21 (H) 12 - 20      GFR est AA >60 >60 ml/min/1.73m2    GFR est non-AA >60 >60 ml/min/1.73m2    Calcium 8.1 (L) 8.5 - 10.1 MG/DL    Phosphorus 2.6 2.6 - 4.7 MG/DL    Albumin 1.6 (L) 3.5 - 5.0 g/dL   MAGNESIUM    Collection Time: 08/11/22  4:03 PM   Result Value Ref Range    Magnesium 2.0 1.6 - 2.4 mg/dL   VANCOMYCIN, RANDOM    Collection Time: 08/11/22  4:03 PM   Result Value Ref Range    Vancomycin, random 18.5 UG/ML   CULTURE, WOUND W GRAM STAIN    Collection Time: 08/11/22  4:03 PM    Specimen: Leg; Wound   Result Value Ref Range    Special Requests: NO SPECIAL REQUESTS      GRAM STAIN NO WBC'S SEEN      GRAM STAIN NO ORGANISMS SEEN      Culture result: PENDING    RBC, ALLOCATE    Collection Time: 08/11/22  5:15 PM   Result Value Ref Range    HISTORY CHECKED?  Historical check performed            Assessment:     Hospital Problems  Never Reviewed            Codes Class Noted POA    YADY (acute kidney injury) [facility]) ICD-10-CM: N17.9  ICD-9-CM: 584.9  8/9/2022 Unknown           Plan/Recommendations/Medical Decision Making:     - Sacral and left ulcers:  Debrided at bedside with NP and wound care nurse  - Continue local wound care  - Will likely need further debridement with wound care team  - Continue antibiotic therapy

## 2022-08-12 NOTE — PROGRESS NOTES
Pharmacist Note - Vancomycin Dosing  Therapy day 4  Indication: HAP  CVVH patient  Current regimen: 750 mg q24h    Recent Labs     08/12/22  1706 08/12/22  0436 08/11/22  1603 08/11/22  0400   WBC  --  8.8 7.3 6.7   CREA 0.92 0.98 1.12 1.39*   BUN 18 21* 23* 28*       A 24 hour vancomycin level of 17.8 mcg/mL was obtained today     Goal target range Trough 10-15 mcg/mL      Plan: Change to 500 mg q24h. Pharmacy will continue to monitor this patient daily for changes in clinical status and renal function.

## 2022-08-12 NOTE — WOUND CARE
[facility] specialty bed ordered from [facility], [phone], Confirmation #[id number]    Edel Tello" RAYA Bustamante, RN, [facility]  Office x [phone]  Fasted way to contact me is Perfect Serve

## 2022-08-12 NOTE — PROGRESS NOTES
Problem: Mobility Impaired (Adult and Pediatric)  Goal: *Acute Goals and Plan of Care (Insert Text)  Description: FUNCTIONAL STATUS PRIOR TO ADMISSION: Patient's recent PLOF has been max A - total A for all self-care and mobility due to illness. At beginning of 2022, patient reports he was living alone in his home with private aids who assisted him with ADLs, but endorses that he was able to independently transfer bed <> power wheelchair with sliding board. Patient is paraplegic at unknown level, demonstrates ability to use UEs but endorses no active movement of bilateral LEs. HOME SUPPORT PRIOR TO ADMISSION: The patient lived at [facility] and required maximal assistance- total assistance for all ADLs/ mobility. Prior to [facility] patient living alone with private aids (see above). Physical Therapy Goals  Initiated 8/12/2022  1. Patient will move from supine to sit and sit to supine  in bed with moderate assistance  within 7 day(s). 2.  Patient will maintain static sitting balance at EOB x 1 minute with minimal assistance within 7 day(s). 3.  Patient will transfer from bed to chair and chair to bed with maximal assistance using the least restrictive device within 7 day(s). Outcome: Progressing Towards Goal  PHYSICAL THERAPY EVALUATION  Patient: Wes Leung (26 y.o. male)  Date: 8/12/2022  Primary Diagnosis: YADY (acute kidney injury) ([facility] [address] [N17.9]       Precautions: paraplegia         ASSESSMENT  Based on the objective data described below, the patient presents with paraplegia of unknown level, gross weakness, impaired sensation, and need for increased assistance. Patient found supine in bed on CRRT therapy, alert and able to nod yes/no and mouth words in response to questions.  Patient endorsed being in [facility] recently requiring max-total A for all ADLs and mobility, however in early 2022 patient endorsed living alone at home with private aids who assisted with most ADLs, but endorsed ability to transfer mod-I bed <> power wheelchair with sliding board. Patient states he has received no PT/OT at [facility]. As patient on CRRT, no rolling or supine <> sit attempted. Patient with no active movement of bilateral LEs 2/2 paraplegia, and PROM completed at all joints bilateral LEs. Prevalon boots donned bilateral feet for pressure relief of heels, with skin breakdown and exudate noted from R heel; RN aware. Patient's LEs elevated on pillows and positioned neutral hip rotation. Patient will require extensive rehabilitation to return to mod-I for bed <> wheelchair transfers. Recommend SNF at discharge for continued rehabilitation. Current Level of Function Impacting Discharge (mobility/balance): max A - total A     Functional Outcome Measure: The patient scored Total: 15/100 on the Barthel Index outcome measure which is indicative of being totally dependent in basic self-care. Other factors to consider for discharge: extended [facility] stay recently receiving no PT/OT     Patient will benefit from skilled therapy intervention to address the above noted impairments. PLAN :  Recommendations and Planned Interventions: bed mobility training, transfer training, gait training, therapeutic exercises, neuromuscular re-education, patient and family training/education, and therapeutic activities      Frequency/Duration: Patient will be followed by physical therapy:  5 times a week to address goals. Recommendation for discharge: (in order for the patient to meet his/her long term goals)  Therapy up to 5 days/week in SNF setting    This discharge recommendation:  A follow-up discussion with the attending provider and/or case management is planned    IF patient discharges home will need the following DME: to be determined (TBD)         SUBJECTIVE:   Patient shook his head \"no\" when asked if he was assisted out of bed or participated in PT/OT at [facility]. OBJECTIVE DATA SUMMARY:   HISTORY:    No past medical history on file. No past surgical history on file. Personal factors and/or comorbidities impacting plan of care: Paraplegia of unknown level     Home Situation  Home Environment: Long term care  Support Systems: Other Family Member(s)  [facility]    EXAMINATION/PRESENTATION/DECISION MAKING:   Critical Behavior:  Neurologic State: Alert  Orientation Level: Unable to verbalize  Cognition: Follows commands  Safety/Judgement: Insight into deficits  Hearing:     Skin:  skin breakdown noted grossly across bilateral LEs - RN aware   Edema: none noted  Range Of Motion:  AROM: Grossly decreased, non-functional           PROM: Grossly decreased, non-functional           Strength:    Strength: Grossly decreased, non-functional                    Tone & Sensation:   Tone: Abnormal (hypotonic bilateral LEs - paraplegic)              Sensation: Impaired (bilateral LEs)               Coordination:  Coordination: Generally decreased, functional  Vision:      Functional Mobility:  Bed Mobility:  Rolling: Maximum assistance;Assist x2 (inferred)            Therapeutic Exercises:   PROM of all joints of bilateral LEs     Functional Measure:  Barthel Index:    Bathin  Bladder: 0  Bowels: 5  Groomin  Dressin  Feeding: 10  Mobility: 0  Stairs: 0  Toilet Use: 0  Transfer (Bed to Chair and Back): 0  Total: 15/100       The Barthel ADL Index: Guidelines  1. The index should be used as a record of what a patient does, not as a record of what a patient could do. 2. The main aim is to establish degree of independence from any help, physical or verbal, however minor and for whatever reason. 3. The need for supervision renders the patient not independent. 4. A patient's performance should be established using the best available evidence. Asking the patient, friends/relatives and nurses are the usual sources, but direct observation and common sense are also important.  However direct testing is not needed. 5. Usually the patient's performance over the preceding 24-48 hours is important, but occasionally longer periods will be relevant. 6. Middle categories imply that the patient supplies over 50 per cent of the effort. 7. Use of aids to be independent is allowed. Score Interpretation (from [address])    Independent   60-79 Minimally independent   40-59 Partially dependent   20-39 Very dependent   <20 Totally dependent     -Syed Osei., Barthel, DMICHAEL. (1965). Functional evaluation: the Barthel Index. [address] ([address] (). The Barthel activities of daily living index: self-reporting versus actual performance in the old (> or = 75 years). Journal [address] 45(7), 14 [facility], J.DREADF, Maynor Abad., Abdirahman Estefani. (1999). Measuring the change in disability after inpatient rehabilitation; comparison of the responsiveness of the Barthel Index and Functional [place] Measure. Journal of Neurology, Neurosurgery, and Psychiatry, 66(4), 433-652. Heather Stephens, N.J.A, STACI Hendrickson, & Fabian Bauman, M.A. (2004) Assessment of post-stroke quality of life in cost-effectiveness studies: The usefulness of the Barthel Index and the EuroQoL-5D.  Quality of Life Research, 15, 392-71        Physical Therapy Evaluation Charge Determination   History Examination Presentation Decision-Making   HIGH Complexity :3+ comorbidities / personal factors will impact the outcome/ POC  LOW Complexity : 1-2 Standardized tests and measures addressing body structure, function, activity limitation and / or participation in recreation  LOW Complexity : Stable, uncomplicated  Other outcome measures Barthel Index  HIGH       Based on the above components, the patient evaluation is determined to be of the following complexity level: LOW     Pain Ratin/10    Activity Tolerance:   Good    After treatment patient left in no apparent distress:   Supine in bed, Heels elevated for pressure relief, and Call bell within reach    COMMUNICATION/EDUCATION:   The patients plan of care was discussed with: Occupational therapist and Registered nurse. Fall prevention education was provided and the patient/caregiver indicated understanding.     Thank you for this referral.  Rupal Ratliff, PT   Time Calculation: 20 mins

## 2022-08-12 NOTE — PROGRESS NOTES
Bedside and Verbal shift change report given to Virginia (oncoming nurse) by Susanna Ferro (offgoing nurse). Report included the following information SBAR, Kardex, Intake/Output, MAR, Med Rec Status, Cardiac Rhythm Sinus Rhythm, and Alarm Parameters . 2000 Dialysis lines switched  0000 Blood ready  0030 Placed on bear hugger, blood started  0230 Blood completed  0300 Disconnecting vent circuit, complaining of pain at trach site. 0.5mg diladuid IV given.   0400 Rectal temp 93.5F, bear hugger increased to 43C for 1 hour

## 2022-08-12 NOTE — PROGRESS NOTES
CRITICAL CARE NOTE      Name: Ashish Ponce   : 1980   MRN: [id number]   Date: 2022      REASON FOR ICU ADMISSION:  Acute renal failure, chronic vent dependence, septic shock     PRINCIPAL ICU DIAGNOSIS   Septic shock, unclear etiology  Acute renal injury  Chronic hypoxic respiratory failure, s/p trach, vent dependent  Chronic decubitus pressure injury ulcers, present on admission  HFrEF (20-25%)  Afib  Chronic hypotension  Paraplegia    BRIEF PATIENT SUMMARY   76-year-old male with known past medical history of multiple chronic issues as listed in problem list, who presented to [facility] ED after being sent from [facility] for possible need for CRRT for YADY due to inability to tolerate iHD due to sepsis/septic shock from unclear source. COMPREHENSIVE ASSESSMENT & PLAN:SYSTEM BASED     24 HOUR EVENTS:   No acute overnight events, on minimal levo. Remains on CRRT. Tolerating trach collar trials. Tolerating p.o.     NEUROLOGICAL:   -Awake and alert in no acute distress  -history of paraplegia  - PT/OT SLP consulted  - Dysphagia with GJ tube in place, however cleared for PO by SLP    PULMONOLOGY:   -Chronic respiratory failure with trach in place ( placed 2022)  -Trial as tolerated, place back on PSV if tires out  -PRN nebs/suctioning  -ABCDEF protocol    CARDIOVASCULAR:   - wean levophed as tolerated  - Atrial fibrillation on eliquis  -Non-ischemic cardiomyopathy with EF 15-20%  -Recent PEA arrest (multiple)  -Pulmonary hypertension with right ventricular dysfunction  -Continue midodrine, florinef, mexiletine  - holding entresto due to hypotension    GASTROINTESTINAL   -Colostomy care  -Continue questran  - Start PO, can stop TF if adequate intake     RENAL/ELECTROLYTE/FLUIDS:   - CRRT per nephology, appreciate assistance   -Trend lytes and replete as needed  -Florinef, midodrine, EPO  -Suprapubic catheter care    ENDOCRINE:   -Continue synthroid    HEMATOLOGY/ONCOLOGY:   - No signs of acute bleeding  - on eliquis    INFECTIOUS DISEASE:   Continue Vanc/Zosyn  Bcx NGTD  Sputum Cs light GNR    PCT downtrending    Unclear source, HAP vs CAUTI vs wound infection  Wound care eval, Gen surg consulted for debridement for purulence in several of wounds    ICU DAILY CHECKLIST     Code Status:Partial (No Shocks, No Compressions) Meds okay  DVT Prophylaxis:Eliquis  T/L/D: Ye, GJ tube, Colostomy, suprapubic catheter, PIV  SUP: None  Diet: Tube Feed,PO as tolerated  Activity Level:Paraplegia  ABCDEF Bundle/Checklist Completed:Yes  Disposition: Stay in ICU  Multidisciplinary Rounds Completed:  Pending  Patient/Family Updated: Yes       HOSPITAL COURSE/DAILY EVENT LOG   8/ pan Cx started on Vanc/Zosyn  8/9 started n CRRT levophed    SUBJECTIVE   Review of Systems   Constitutional:  Negative for chills, diaphoresis and fever. HENT:  Negative for congestion, ear pain, hearing loss, nosebleeds, sinus pain and sore throat. Eyes:  Negative for blurred vision, double vision and pain. Respiratory:  Negative for cough, sputum production, shortness of breath and wheezing. Cardiovascular:  Negative for chest pain, palpitations and orthopnea. Gastrointestinal:  Negative for abdominal pain, constipation, heartburn, nausea and vomiting. Genitourinary:  Negative for hematuria. Musculoskeletal:  Negative for falls, joint pain and myalgias. Skin:  Negative for itching and rash. Neurological:  Negative for dizziness, tremors, speech change, focal weakness, weakness and headaches. Endo/Heme/Allergies:  Does not bruise/bleed easily. Psychiatric/Behavioral:  Negative for depression. OBJECTIVE     Labs and Data: Reviewed 08/12/22  Medications: Reviewed 08/12/22  Imaging: Reviewed 08/12/22    Physical Exam  Vitals and nursing note reviewed. Constitutional:       General: He is awake. He is not in acute distress. Appearance: He is underweight. HENT:      Head: Normocephalic and atraumatic.       Nose: Nose normal.      Mouth/Throat:      Comments: trach  Eyes:      General: Lids are normal.      Conjunctiva/sclera: Conjunctivae normal.      Pupils: Pupils are equal, round, and reactive to light. Neck:     Cardiovascular:      Rate and Rhythm: Normal rate. Rhythm irregularly irregular. Heart sounds: Normal heart sounds, S1 normal and S2 normal. No murmur heard. No friction rub. No gallop. Pulmonary:      Effort: Pulmonary effort is normal. No tachypnea. Breath sounds: Normal breath sounds and air entry. Comments: On vent  Abdominal:      General: The ostomy site is clean. Bowel sounds are normal.       Musculoskeletal:      Right lower leg: No edema. Left lower leg: No edema. Comments: Chronic numerous pressure ulcer wounds throughout, present on admission see wound care note for further details   Skin:     General: Skin is warm and dry. Capillary Refill: Capillary refill takes 2 to 3 seconds. Coloration: Skin is pale. Findings: Wound present. Neurological:      Mental Status: He is alert, oriented to person, place, and time and easily aroused. Mental status is at baseline. GCS: GCS eye subscore is 4. GCS verbal subscore is 5. GCS motor subscore is 6. Psychiatric:         Behavior: Behavior is cooperative.         Visit Vitals  /71   Pulse 92   Temp (!) 96.5 °F (35.8 °C)   Resp 25   Ht 5' 8\" (1.727 m)   Wt 66.7 kg (147 lb 0.8 oz)   SpO2 99%   BMI 22.36 kg/m²      O2 Device: Tracheostomy, Ventilator Temp (24hrs), Av.1 °F (35.6 °C), Min:93.5 °F (34.2 °C), Max:97.2 °F (36.2 °C)           Intake/Output:     Intake/Output Summary (Last 24 hours) at 2022 1327  Last data filed at 2022 1300  Gross per 24 hour   Intake 4057.17 ml   Output 3531 ml   Net 526.17 ml         Imaging       Pertinent imaging reviewed and interpreted independently as noted above    CRITICAL CARE DOCUMENTATION  I had a face to face encounter with the patient, reviewed and interpreted patient data including clinical events, labs, images, vital signs, I/O's, and examined patient. I have discussed the case and the plan and management of the patient's care with the consulting services, the bedside nurses and the respiratory therapist.      NOTE OF PERSONAL INVOLVEMENT IN CARE   This patient has a high probability of imminent, clinically significant deterioration, which requires the highest level of preparedness to intervene urgently. I participated in the decision-making and personally managed or directed the management of the following life and organ supporting interventions that required my frequent assessment to treat or prevent imminent deterioration. I personally spent 45 minutes of critical care time. This is time spent at this critically ill patient's bedside actively involved in patient care as well as the coordination of care. This does not include any procedural time which has been billed separately. Abe Fox MD  Staff [facility]

## 2022-08-12 NOTE — DIALYSIS
CRRT / [id number]           Orders   Mode: CVVH    Blood Flow Rate: 200 ml/min   Prismasol Dose: 1,500 ml/hr  PBP: 750 ml/hr  Replacement: 750 ml/hr   Prismasol Concentrate: 4K / 2.5Ca   Blood Warmer Temp: 37*C   Net Fluid Removal: 50 ml/hr            Metrics   BP: 88/66   HR: 72   Access Pressure: -85   Filter Pressure: 145   Return Pressure: 60   TMP: 61   Pressure Drop: 55            Access   Type & Location: RIJ nontunneled CVC: tegaderm dressing with CHG patch C/D/I, dated 8/10/22. Lines reversed - positional curved lumen CVC. Labs   HBsAg (Antigen) / date: Negative 8/9/22                 HBsAb (Antibody) / date: Susceptible 8/9/22   Source: katena            Safety:   Time Out Done:   0200   Consent obtained/signed: Verified   Primary Nurse Rpt: EVIE Arroyo RN      Comments / Plan:   Code status, labs, notes and orders reviewed. In at bedside to assess filter, no indication for change, running well at this time. Lines reversed, visible and connections secure with blood warmer supported on return line and set at 37*C. Heparin infusing @ 500 units/hr. Education & pre/post to primary RN.

## 2022-08-13 LAB
ALBUMIN SERPL-MCNC: 2 G/DL (ref 3.5–5)
ALBUMIN SERPL-MCNC: 2.3 G/DL (ref 3.5–5)
ANION GAP SERPL CALC-SCNC: 5 MMOL/L (ref 5–15)
ANION GAP SERPL CALC-SCNC: 6 MMOL/L (ref 5–15)
BACTERIA SPEC CULT: ABNORMAL
BUN SERPL-MCNC: 14 MG/DL (ref 6–20)
BUN SERPL-MCNC: 15 MG/DL (ref 6–20)
BUN/CREAT SERPL: 18 (ref 12–20)
BUN/CREAT SERPL: 19 (ref 12–20)
CALCIUM SERPL-MCNC: 7.7 MG/DL (ref 8.5–10.1)
CALCIUM SERPL-MCNC: 7.9 MG/DL (ref 8.5–10.1)
CHLORIDE SERPL-SCNC: 105 MMOL/L (ref 97–108)
CHLORIDE SERPL-SCNC: 105 MMOL/L (ref 97–108)
CO2 SERPL-SCNC: 26 MMOL/L (ref 21–32)
CO2 SERPL-SCNC: 27 MMOL/L (ref 21–32)
CREAT SERPL-MCNC: 0.78 MG/DL (ref 0.7–1.3)
CREAT SERPL-MCNC: 0.79 MG/DL (ref 0.7–1.3)
ERYTHROCYTE [DISTWIDTH] IN BLOOD BY AUTOMATED COUNT: 14.5 % (ref 11.5–14.5)
GLUCOSE BLD STRIP.AUTO-MCNC: 124 MG/DL (ref 65–117)
GLUCOSE SERPL-MCNC: 140 MG/DL (ref 65–100)
GLUCOSE SERPL-MCNC: 77 MG/DL (ref 65–100)
GRAM STN SPEC: ABNORMAL
HCT VFR BLD AUTO: 22.6 % (ref 36.6–50.3)
HGB BLD-MCNC: 7.3 G/DL (ref 12.1–17)
MAGNESIUM SERPL-MCNC: 2.4 MG/DL (ref 1.6–2.4)
MAGNESIUM SERPL-MCNC: 2.5 MG/DL (ref 1.6–2.4)
MCH RBC QN AUTO: 28.3 PG (ref 26–34)
MCHC RBC AUTO-ENTMCNC: 32.3 G/DL (ref 30–36.5)
MCV RBC AUTO: 87.6 FL (ref 80–99)
NRBC # BLD: 0 K/UL (ref 0–0.01)
NRBC BLD-RTO: 0 PER 100 WBC
PHOSPHATE SERPL-MCNC: 1.7 MG/DL (ref 2.6–4.7)
PHOSPHATE SERPL-MCNC: 1.8 MG/DL (ref 2.6–4.7)
PLATELET # BLD AUTO: 115 K/UL (ref 150–400)
PMV BLD AUTO: 11.4 FL (ref 8.9–12.9)
POTASSIUM SERPL-SCNC: 3.4 MMOL/L (ref 3.5–5.1)
POTASSIUM SERPL-SCNC: 3.6 MMOL/L (ref 3.5–5.1)
PROCALCITONIN SERPL-MCNC: 1.5 NG/ML
RBC # BLD AUTO: 2.58 M/UL (ref 4.1–5.7)
SERVICE CMNT-IMP: ABNORMAL
SERVICE CMNT-IMP: ABNORMAL
SODIUM SERPL-SCNC: 137 MMOL/L (ref 136–145)
SODIUM SERPL-SCNC: 137 MMOL/L (ref 136–145)
WBC # BLD AUTO: 5.6 K/UL (ref 4.1–11.1)

## 2022-08-13 PROCEDURE — 74011636637 HC RX REV CODE- 636/637: Performed by: NURSE PRACTITIONER

## 2022-08-13 PROCEDURE — 80069 RENAL FUNCTION PANEL: CPT

## 2022-08-13 PROCEDURE — 85027 COMPLETE CBC AUTOMATED: CPT

## 2022-08-13 PROCEDURE — 90945 DIALYSIS ONE EVALUATION: CPT

## 2022-08-13 PROCEDURE — 77030018798 HC PMP KT ENTRL FED COVD -A

## 2022-08-13 PROCEDURE — 74011000258 HC RX REV CODE- 258: Performed by: STUDENT IN AN ORGANIZED HEALTH CARE EDUCATION/TRAINING PROGRAM

## 2022-08-13 PROCEDURE — 36415 COLL VENOUS BLD VENIPUNCTURE: CPT

## 2022-08-13 PROCEDURE — 83735 ASSAY OF MAGNESIUM: CPT

## 2022-08-13 PROCEDURE — 74011250637 HC RX REV CODE- 250/637: Performed by: INTERNAL MEDICINE

## 2022-08-13 PROCEDURE — P9047 ALBUMIN (HUMAN), 25%, 50ML: HCPCS | Performed by: INTERNAL MEDICINE

## 2022-08-13 PROCEDURE — 74011000250 HC RX REV CODE- 250: Performed by: NURSE PRACTITIONER

## 2022-08-13 PROCEDURE — 74011250636 HC RX REV CODE- 250/636: Performed by: STUDENT IN AN ORGANIZED HEALTH CARE EDUCATION/TRAINING PROGRAM

## 2022-08-13 PROCEDURE — 74011000250 HC RX REV CODE- 250: Performed by: INTERNAL MEDICINE

## 2022-08-13 PROCEDURE — 74011250637 HC RX REV CODE- 250/637: Performed by: NURSE PRACTITIONER

## 2022-08-13 PROCEDURE — 74011250636 HC RX REV CODE- 250/636: Performed by: NURSE PRACTITIONER

## 2022-08-13 PROCEDURE — 74011250636 HC RX REV CODE- 250/636: Performed by: INTERNAL MEDICINE

## 2022-08-13 PROCEDURE — 84145 PROCALCITONIN (PCT): CPT

## 2022-08-13 PROCEDURE — 94003 VENT MGMT INPAT SUBQ DAY: CPT

## 2022-08-13 PROCEDURE — 74011250637 HC RX REV CODE- 250/637: Performed by: STUDENT IN AN ORGANIZED HEALTH CARE EDUCATION/TRAINING PROGRAM

## 2022-08-13 PROCEDURE — 82962 GLUCOSE BLOOD TEST: CPT

## 2022-08-13 PROCEDURE — 65620000000 HC RM CCU GENERAL

## 2022-08-13 RX ORDER — SODIUM,POTASSIUM PHOSPHATES 280-250MG
1 POWDER IN PACKET (EA) ORAL 2 TIMES DAILY
Status: COMPLETED | OUTPATIENT
Start: 2022-08-13 | End: 2022-08-16

## 2022-08-13 RX ADMIN — MINERAL SUPPLEMENT IRON 300 MG / 5 ML STRENGTH LIQUID 100 PER BOX UNFLAVORED 300 MG: at 11:45

## 2022-08-13 RX ADMIN — Medication 10 ML: at 06:51

## 2022-08-13 RX ADMIN — CHOLESTYRAMINE 4 G: 4 POWDER, FOR SUSPENSION ORAL at 11:45

## 2022-08-13 RX ADMIN — Medication 5 UNITS: at 09:00

## 2022-08-13 RX ADMIN — Medication 10 ML: at 22:14

## 2022-08-13 RX ADMIN — APIXABAN 5 MG: 5 TABLET, FILM COATED ORAL at 03:09

## 2022-08-13 RX ADMIN — CHOLESTYRAMINE 4 G: 4 POWDER, FOR SUSPENSION ORAL at 08:21

## 2022-08-13 RX ADMIN — CALCIUM CHLORIDE, MAGNESIUM CHLORIDE, DEXTROSE MONOHYDRATE, LACTIC ACID, SODIUM CHLORIDE, SODIUM BICARBONATE AND POTASSIUM CHLORIDE: 3.68; 3.05; 22; 5.4; 6.46; 3.09; .314 INJECTION INTRAVENOUS at 17:05

## 2022-08-13 RX ADMIN — PIPERACILLIN AND TAZOBACTAM 3.38 G: 3; .375 INJECTION, POWDER, LYOPHILIZED, FOR SOLUTION INTRAVENOUS at 08:21

## 2022-08-13 RX ADMIN — MINERAL SUPPLEMENT IRON 300 MG / 5 ML STRENGTH LIQUID 100 PER BOX UNFLAVORED 300 MG: at 08:20

## 2022-08-13 RX ADMIN — CALCIUM CHLORIDE, MAGNESIUM CHLORIDE, DEXTROSE MONOHYDRATE, LACTIC ACID, SODIUM CHLORIDE, SODIUM BICARBONATE AND POTASSIUM CHLORIDE: 3.68; 3.05; 22; 5.4; 6.46; 3.09; .314 INJECTION INTRAVENOUS at 09:44

## 2022-08-13 RX ADMIN — APIXABAN 5 MG: 5 TABLET, FILM COATED ORAL at 15:07

## 2022-08-13 RX ADMIN — CALCIUM CHLORIDE, MAGNESIUM CHLORIDE, DEXTROSE MONOHYDRATE, LACTIC ACID, SODIUM CHLORIDE, SODIUM BICARBONATE AND POTASSIUM CHLORIDE: 3.68; 3.05; 22; 5.4; 6.46; 3.09; .314 INJECTION INTRAVENOUS at 05:00

## 2022-08-13 RX ADMIN — ALBUMIN (HUMAN) 25 G: 0.25 INJECTION, SOLUTION INTRAVENOUS at 00:18

## 2022-08-13 RX ADMIN — CALCIUM CHLORIDE, MAGNESIUM CHLORIDE, DEXTROSE MONOHYDRATE, LACTIC ACID, SODIUM CHLORIDE, SODIUM BICARBONATE AND POTASSIUM CHLORIDE: 3.68; 3.05; 22; 5.4; 6.46; 3.09; .314 INJECTION INTRAVENOUS at 11:16

## 2022-08-13 RX ADMIN — CALCIUM CHLORIDE, MAGNESIUM CHLORIDE, DEXTROSE MONOHYDRATE, LACTIC ACID, SODIUM CHLORIDE, SODIUM BICARBONATE AND POTASSIUM CHLORIDE: 3.68; 3.05; 22; 5.4; 6.46; 3.09; .314 INJECTION INTRAVENOUS at 09:45

## 2022-08-13 RX ADMIN — PIPERACILLIN AND TAZOBACTAM 3.38 G: 3; .375 INJECTION, POWDER, LYOPHILIZED, FOR SOLUTION INTRAVENOUS at 22:13

## 2022-08-13 RX ADMIN — ONDANSETRON HYDROCHLORIDE 4 MG: 2 INJECTION, SOLUTION INTRAMUSCULAR; INTRAVENOUS at 11:51

## 2022-08-13 RX ADMIN — MEXILETINE HYDROCHLORIDE 150 MG: 150 CAPSULE ORAL at 05:15

## 2022-08-13 RX ADMIN — PIPERACILLIN AND TAZOBACTAM 3.38 G: 3; .375 INJECTION, POWDER, LYOPHILIZED, FOR SOLUTION INTRAVENOUS at 15:07

## 2022-08-13 RX ADMIN — SODIUM CHLORIDE 250 ML: 9 INJECTION, SOLUTION INTRAVENOUS at 05:25

## 2022-08-13 RX ADMIN — MIDODRINE HYDROCHLORIDE 15 MG: 5 TABLET ORAL at 05:13

## 2022-08-13 RX ADMIN — MEXILETINE HYDROCHLORIDE 150 MG: 150 CAPSULE ORAL at 15:07

## 2022-08-13 RX ADMIN — SODIUM CHLORIDE 250 ML: 9 INJECTION, SOLUTION INTRAVENOUS at 05:28

## 2022-08-13 RX ADMIN — MIDODRINE HYDROCHLORIDE 15 MG: 5 TABLET ORAL at 15:07

## 2022-08-13 RX ADMIN — Medication 10 ML: at 15:07

## 2022-08-13 RX ADMIN — MIDODRINE HYDROCHLORIDE 15 MG: 5 TABLET ORAL at 22:14

## 2022-08-13 RX ADMIN — CHOLESTYRAMINE 4 G: 4 POWDER, FOR SUSPENSION ORAL at 18:56

## 2022-08-13 RX ADMIN — MINERAL SUPPLEMENT IRON 300 MG / 5 ML STRENGTH LIQUID 100 PER BOX UNFLAVORED 300 MG: at 18:56

## 2022-08-13 RX ADMIN — LEVOTHYROXINE SODIUM 50 MCG: 0.05 TABLET ORAL at 05:13

## 2022-08-13 RX ADMIN — COLLAGENASE SANTYL: 250 OINTMENT TOPICAL at 08:21

## 2022-08-13 RX ADMIN — CHLORHEXIDINE GLUCONATE 15 ML: 1.2 RINSE ORAL at 22:14

## 2022-08-13 RX ADMIN — ATORVASTATIN CALCIUM 40 MG: 40 TABLET, FILM COATED ORAL at 22:13

## 2022-08-13 RX ADMIN — POTASSIUM & SODIUM PHOSPHATES POWDER PACK 280-160-250 MG 1 PACKET: 280-160-250 PACK at 22:16

## 2022-08-13 RX ADMIN — CHLORHEXIDINE GLUCONATE 15 ML: 1.2 RINSE ORAL at 08:21

## 2022-08-13 RX ADMIN — FLUDROCORTISONE ACETATE 0.1 MG: 0.1 TABLET ORAL at 08:20

## 2022-08-13 RX ADMIN — HYDROCODONE BITARTRATE AND ACETAMINOPHEN 1 TABLET: 10; 325 TABLET ORAL at 11:45

## 2022-08-13 RX ADMIN — VANCOMYCIN HYDROCHLORIDE 500 MG: 500 INJECTION, POWDER, LYOPHILIZED, FOR SOLUTION INTRAVENOUS at 18:56

## 2022-08-13 NOTE — DIALYSIS
CRRT / [id number]           Orders   Mode: CVVH    Blood Flow Rate: 200 ml/min   Prismasol Dose: 1,500 ml/hr  PBP: 750 ml/hr  Replacement: 750 ml/hr   Prismasol Concentrate: 4K / 2.5Ca   Blood Warmer Temp: 37*C   Net Fluid Removal: 50 ml/hr            Metrics   BP: 117/79   HR: 77   Access Pressure: -83   Filter Pressure: 140   Return Pressure: 52   TMP: 65   Pressure Drop: 57            Access   Type & Location: RIJ nontunneled CVC: tegaderm dressing with CHG patch C/D/I, dated 8/10/22. Lines reversed - positional curved lumen CVC. Labs   HBsAg (Antigen) / date: Negative 8/9/22                 HBsAb (Antibody) / date: Susceptible 8/9/22   Source: [employer]            Safety:   Time Out Done:   0040   Consent obtained/signed: Verified   Primary Nurse Rpt: Linus Mauricio RN      Comments / Plan:   Code status, labs, notes and orders reviewed. In at bedside to assess filter, no indication for change, running well at this time. Lines reversed, visible and connections secure with blood warmer supported on return line and set at 37*C. Heparin infusing @ 500 units/hr. Education & pre/post to primary RN.

## 2022-08-13 NOTE — PROGRESS NOTES
2000-Bedside and Verbal shift change report given to Jorge (oncoming nurse) by Angely Cardona (offgoing nurse). Report included the following information SBAR, Intake/Output, MAR, Recent Results, Cardiac Rhythm SR, and Alarm Parameters . 0600- Patient did not sleep much through night, upset he was restrained. Patient reminded he pulled his trach out earlier in the day and would calm down. 0800- Bedside and Verbal shift change report given to Gaby Crabtree (oncoming nurse) by Jorge (offgoing nurse). Report included the following information SBAR, Intake/Output, Recent Results, Cardiac Rhythm SR, and Alarm Parameters .

## 2022-08-13 NOTE — PROGRESS NOTES
08/13/22 1058   Oxygen Therapy   O2 Sat (%) 100 %   Pulse via Oximetry 85 beats per minute   O2 Device Tracheal collar   O2 Flow Rate (L/min) 6 l/min   FIO2 (%) 28 %   Pre-Treatment   Breathing Pattern Regular   Cough Productive   Sputum amount Moderate   Sputum color/odor White   Sputum consistency Thick   Sputum method obtained Tracheal   Respirations 14       Patient suctioned, cuff deflated and placed on Trach collar.

## 2022-08-13 NOTE — PROGRESS NOTES
CRITICAL CARE NOTE      Name: eDmetria Agosto   : 1980   MRN: [id number]   Date: 2022      REASON FOR ICU ADMISSION:  Acute renal failure, chronic vent dependence, septic shock     PRINCIPAL ICU DIAGNOSIS   Septic shock, unclear etiology  Acute renal injury  Chronic hypoxic respiratory failure, s/p trach, vent dependent  Chronic decubitus pressure injury ulcers, present on admission  HFrEF (20-25%)  Afib  Chronic hypotension  Paraplegia    BRIEF PATIENT SUMMARY   68-year-old male with known past medical history of multiple chronic issues as listed in problem list, who presented to [facility] ED after being sent from [address] [place] [address] for possible need for CRRT for YADY due to inability to tolerate iHD due to sepsis/septic shock from unclear source. COMPREHENSIVE ASSESSMENT & PLAN:SYSTEM BASED     24 HOUR EVENTS:   No acute overnight events, off norepi. Remains on CRRT. NEUROLOGICAL:   -Awake and alert in no acute distress  -history of paraplegia  - PT/OT SLP consulted  - Dysphagia with GJ tube in place, however cleared for PO by SLP    PULMONOLOGY:   -Chronic respiratory failure with trach in place ( placed 2022)  -Trial as tolerated. -PRN nebs/suctioning  -ABCDEF protocol    CARDIOVASCULAR:     - Atrial fibrillation on eliquis  -Non-ischemic cardiomyopathy with EF 15-20%  -Recent PEA arrest (multiple)  -Pulmonary hypertension with right ventricular dysfunction  -Continue midodrine, florinef, mexiletine  - holding entresto due to hypotension    GASTROINTESTINAL   -Colostomy care  -Continue questran  - Start PO, can stop TF if adequate intake     RENAL/ELECTROLYTE/FLUIDS:   - CRRT per nephology, appreciate assistance   -Hopeful transition to IHD as off norepinephrine gtt.    -Trend lytes and replete as needed  -Florinef, midodrine, EPO  -Suprapubic catheter care    ENDOCRINE:   -Continue synthroid    HEMATOLOGY/ONCOLOGY:   - No signs of acute bleeding  - on eliquis    INFECTIOUS DISEASE:   Continue Vanc/Zosyn  Bcx NGTD    PCT downtrending    Unclear source, HAP vs wound infection  General surgery follow-up appreciated for decubitus ulcers. ICU DAILY CHECKLIST     Code Status:Partial (No Shocks, No Compressions) Meds okay  DVT Prophylaxis:Eliquis  T/L/D: Ye, GJ tube, Colostomy, suprapubic catheter, PIV  SUP: None  Diet: Tube Feed,PO as tolerated  Activity Level:Paraplegia  ABCDEF Bundle/Checklist Completed:Yes  Disposition: Stay in ICU  Multidisciplinary Rounds Completed:  Pending  Patient/Family Updated: Yes       HOSPITAL COURSE/DAILY EVENT LOG   8/ pan Cx started on Vanc/Zosyn  8/9 started n CRRT levophed    SUBJECTIVE   Review of Systems   Constitutional:  Negative for chills, diaphoresis and fever. HENT:  Negative for congestion, ear pain, hearing loss, nosebleeds, sinus pain and sore throat. Eyes:  Negative for blurred vision, double vision and pain. Respiratory:  Negative for cough, sputum production, shortness of breath and wheezing. Cardiovascular:  Negative for chest pain, palpitations and orthopnea. Gastrointestinal:  Negative for abdominal pain, constipation, heartburn, nausea and vomiting. Genitourinary:  Negative for hematuria. Musculoskeletal:  Negative for falls, joint pain and myalgias. Skin:  Negative for itching and rash. Neurological:  Negative for dizziness, tremors, speech change, focal weakness, weakness and headaches. Endo/Heme/Allergies:  Does not bruise/bleed easily. Psychiatric/Behavioral:  Negative for depression. OBJECTIVE     Labs and Data: Reviewed 08/13/22  Medications: Reviewed 08/13/22  Imaging: Reviewed 08/13/22    Physical Exam  Vitals and nursing note reviewed. Constitutional:       General: He is awake. He is not in acute distress. Appearance: He is underweight. HENT:      Head: Normocephalic and atraumatic.       Nose: Nose normal.      Mouth/Throat:      Comments: trach  Eyes:      General: Lids are normal. Conjunctiva/sclera: Conjunctivae normal.      Pupils: Pupils are equal, round, and reactive to light. Neck:     Cardiovascular:      Rate and Rhythm: Normal rate. Rhythm irregularly irregular. Heart sounds: Normal heart sounds, S1 normal and S2 normal. No murmur heard. No friction rub. No gallop. Pulmonary:      Effort: Pulmonary effort is normal. No tachypnea. Breath sounds: Normal breath sounds and air entry. Comments: On vent  Abdominal:      General: The ostomy site is clean. Bowel sounds are normal.       Musculoskeletal:      Right lower leg: No edema. Left lower leg: No edema. Comments: Chronic numerous pressure ulcer wounds throughout, present on admission see wound care note for further details   Skin:     General: Skin is warm and dry. Capillary Refill: Capillary refill takes 2 to 3 seconds. Coloration: Skin is pale. Findings: Wound present. Neurological:      Mental Status: He is alert, oriented to person, place, and time and easily aroused. Mental status is at baseline. GCS: GCS eye subscore is 4. GCS verbal subscore is 5. GCS motor subscore is 6. Psychiatric:         Behavior: Behavior is cooperative.         Visit Vitals  BP 92/64   Pulse 88   Temp 96.8 °F (36 °C)   Resp 11   Ht 5' 8\" (1.727 m)   Wt 65 kg (143 lb 4.8 oz)   SpO2 97%   BMI 21.79 kg/m²    O2 Flow Rate (L/min): 6 l/min O2 Device: Ventilator, Tracheostomy Temp (24hrs), Av.6 °F (35.9 °C), Min:96 °F (35.6 °C), Max:97 °F (36.1 °C)           Intake/Output:     Intake/Output Summary (Last 24 hours) at 2022 1439  Last data filed at 2022 1400  Gross per 24 hour   Intake 1989.99 ml   Output 2884 ml   Net -894.01 ml         Imaging       Pertinent imaging reviewed and interpreted independently as noted above    CRITICAL CARE DOCUMENTATION  I had a face to face encounter with the patient, reviewed and interpreted patient data including clinical events, labs, images, vital signs, I/O's, and examined patient. I have discussed the case and the plan and management of the patient's care with the consulting services, the bedside nurses and the respiratory therapist.      NOTE OF PERSONAL INVOLVEMENT IN CARE   This patient has a high probability of imminent, clinically significant deterioration, which requires the highest level of preparedness to intervene urgently. I participated in the decision-making and personally managed or directed the management of the following life and organ supporting interventions that required my frequent assessment to treat or prevent imminent deterioration. I personally spent 30 minutes of critical care time. This is time spent at this critically ill patient's bedside actively involved in patient care as well as the coordination of care. This does not include any procedural time which has been billed separately.     Keren Rosenberg MD  Staff [facility]

## 2022-08-14 LAB
ALBUMIN SERPL-MCNC: 1.8 G/DL (ref 3.5–5)
ANION GAP SERPL CALC-SCNC: 5 MMOL/L (ref 5–15)
BACTERIA SPEC CULT: NORMAL
BUN SERPL-MCNC: 16 MG/DL (ref 6–20)
BUN/CREAT SERPL: 22 (ref 12–20)
CALCIUM SERPL-MCNC: 7.5 MG/DL (ref 8.5–10.1)
CHLORIDE SERPL-SCNC: 106 MMOL/L (ref 97–108)
CO2 SERPL-SCNC: 27 MMOL/L (ref 21–32)
CREAT SERPL-MCNC: 0.74 MG/DL (ref 0.7–1.3)
ERYTHROCYTE [DISTWIDTH] IN BLOOD BY AUTOMATED COUNT: 14.6 % (ref 11.5–14.5)
GLUCOSE BLD STRIP.AUTO-MCNC: 138 MG/DL (ref 65–117)
GLUCOSE SERPL-MCNC: 126 MG/DL (ref 65–100)
HCT VFR BLD AUTO: 25.6 % (ref 36.6–50.3)
HGB BLD-MCNC: 8 G/DL (ref 12.1–17)
MAGNESIUM SERPL-MCNC: 2.4 MG/DL (ref 1.6–2.4)
MCH RBC QN AUTO: 27.5 PG (ref 26–34)
MCHC RBC AUTO-ENTMCNC: 31.3 G/DL (ref 30–36.5)
MCV RBC AUTO: 88 FL (ref 80–99)
NRBC # BLD: 0 K/UL (ref 0–0.01)
NRBC BLD-RTO: 0 PER 100 WBC
PHOSPHATE SERPL-MCNC: 1.7 MG/DL (ref 2.6–4.7)
PLATELET # BLD AUTO: 160 K/UL (ref 150–400)
PMV BLD AUTO: 11.5 FL (ref 8.9–12.9)
POTASSIUM SERPL-SCNC: 3.5 MMOL/L (ref 3.5–5.1)
PROCALCITONIN SERPL-MCNC: 1.17 NG/ML
RBC # BLD AUTO: 2.91 M/UL (ref 4.1–5.7)
SERVICE CMNT-IMP: ABNORMAL
SERVICE CMNT-IMP: NORMAL
SODIUM SERPL-SCNC: 138 MMOL/L (ref 136–145)
WBC # BLD AUTO: 8.3 K/UL (ref 4.1–11.1)

## 2022-08-14 PROCEDURE — 65620000000 HC RM CCU GENERAL

## 2022-08-14 PROCEDURE — 74011250637 HC RX REV CODE- 250/637: Performed by: NURSE PRACTITIONER

## 2022-08-14 PROCEDURE — 74011250637 HC RX REV CODE- 250/637: Performed by: INTERNAL MEDICINE

## 2022-08-14 PROCEDURE — 74011000250 HC RX REV CODE- 250: Performed by: INTERNAL MEDICINE

## 2022-08-14 PROCEDURE — 74011250636 HC RX REV CODE- 250/636: Performed by: STUDENT IN AN ORGANIZED HEALTH CARE EDUCATION/TRAINING PROGRAM

## 2022-08-14 PROCEDURE — 74011000250 HC RX REV CODE- 250: Performed by: NURSE PRACTITIONER

## 2022-08-14 PROCEDURE — 36415 COLL VENOUS BLD VENIPUNCTURE: CPT

## 2022-08-14 PROCEDURE — 82962 GLUCOSE BLOOD TEST: CPT

## 2022-08-14 PROCEDURE — 84145 PROCALCITONIN (PCT): CPT

## 2022-08-14 PROCEDURE — 80069 RENAL FUNCTION PANEL: CPT

## 2022-08-14 PROCEDURE — 74011000258 HC RX REV CODE- 258: Performed by: STUDENT IN AN ORGANIZED HEALTH CARE EDUCATION/TRAINING PROGRAM

## 2022-08-14 PROCEDURE — 74011250636 HC RX REV CODE- 250/636: Performed by: INTERNAL MEDICINE

## 2022-08-14 PROCEDURE — 90945 DIALYSIS ONE EVALUATION: CPT

## 2022-08-14 PROCEDURE — 83735 ASSAY OF MAGNESIUM: CPT

## 2022-08-14 PROCEDURE — 74011000258 HC RX REV CODE- 258: Performed by: INTERNAL MEDICINE

## 2022-08-14 PROCEDURE — 94003 VENT MGMT INPAT SUBQ DAY: CPT

## 2022-08-14 PROCEDURE — 74011636637 HC RX REV CODE- 636/637: Performed by: NURSE PRACTITIONER

## 2022-08-14 PROCEDURE — 85027 COMPLETE CBC AUTOMATED: CPT

## 2022-08-14 PROCEDURE — 74011250637 HC RX REV CODE- 250/637: Performed by: STUDENT IN AN ORGANIZED HEALTH CARE EDUCATION/TRAINING PROGRAM

## 2022-08-14 RX ORDER — MIDODRINE HYDROCHLORIDE 5 MG/1
20 TABLET ORAL EVERY 8 HOURS
Status: DISCONTINUED | OUTPATIENT
Start: 2022-08-14 | End: 2022-09-01 | Stop reason: HOSPADM

## 2022-08-14 RX ADMIN — Medication 10 ML: at 15:32

## 2022-08-14 RX ADMIN — Medication 10 ML: at 20:56

## 2022-08-14 RX ADMIN — MIDODRINE HYDROCHLORIDE 20 MG: 5 TABLET ORAL at 20:56

## 2022-08-14 RX ADMIN — APIXABAN 5 MG: 5 TABLET, FILM COATED ORAL at 15:20

## 2022-08-14 RX ADMIN — MEXILETINE HYDROCHLORIDE 150 MG: 150 CAPSULE ORAL at 15:26

## 2022-08-14 RX ADMIN — PIPERACILLIN AND TAZOBACTAM 3.38 G: 3; .375 INJECTION, POWDER, LYOPHILIZED, FOR SOLUTION INTRAVENOUS at 06:59

## 2022-08-14 RX ADMIN — APIXABAN 5 MG: 5 TABLET, FILM COATED ORAL at 03:49

## 2022-08-14 RX ADMIN — MINERAL SUPPLEMENT IRON 300 MG / 5 ML STRENGTH LIQUID 100 PER BOX UNFLAVORED 300 MG: at 07:00

## 2022-08-14 RX ADMIN — CALCIUM CHLORIDE, MAGNESIUM CHLORIDE, DEXTROSE MONOHYDRATE, LACTIC ACID, SODIUM CHLORIDE, SODIUM BICARBONATE AND POTASSIUM CHLORIDE: 3.68; 3.05; 22; 5.4; 6.46; 3.09; .314 INJECTION INTRAVENOUS at 01:17

## 2022-08-14 RX ADMIN — CHLORHEXIDINE GLUCONATE 15 ML: 1.2 RINSE ORAL at 20:56

## 2022-08-14 RX ADMIN — ATORVASTATIN CALCIUM 40 MG: 40 TABLET, FILM COATED ORAL at 20:56

## 2022-08-14 RX ADMIN — POTASSIUM & SODIUM PHOSPHATES POWDER PACK 280-160-250 MG 1 PACKET: 280-160-250 PACK at 17:54

## 2022-08-14 RX ADMIN — MEXILETINE HYDROCHLORIDE 150 MG: 150 CAPSULE ORAL at 03:49

## 2022-08-14 RX ADMIN — COLLAGENASE SANTYL: 250 OINTMENT TOPICAL at 09:43

## 2022-08-14 RX ADMIN — HEPARIN SODIUM AND DEXTROSE 500 UNITS/HR: 10000; 5 INJECTION INTRAVENOUS at 07:00

## 2022-08-14 RX ADMIN — MIDODRINE HYDROCHLORIDE 20 MG: 5 TABLET ORAL at 15:20

## 2022-08-14 RX ADMIN — NOREPINEPHRINE BITARTRATE 3 MCG/MIN: 1 INJECTION, SOLUTION, CONCENTRATE INTRAVENOUS at 04:49

## 2022-08-14 RX ADMIN — LEVOTHYROXINE SODIUM 50 MCG: 0.05 TABLET ORAL at 07:00

## 2022-08-14 RX ADMIN — CALCIUM CHLORIDE, MAGNESIUM CHLORIDE, DEXTROSE MONOHYDRATE, LACTIC ACID, SODIUM CHLORIDE, SODIUM BICARBONATE AND POTASSIUM CHLORIDE: 3.68; 3.05; 22; 5.4; 6.46; 3.09; .314 INJECTION INTRAVENOUS at 07:57

## 2022-08-14 RX ADMIN — HYDROCODONE BITARTRATE AND ACETAMINOPHEN 1 TABLET: 10; 325 TABLET ORAL at 09:22

## 2022-08-14 RX ADMIN — Medication 5 UNITS: at 09:10

## 2022-08-14 RX ADMIN — CHOLESTYRAMINE 4 G: 4 POWDER, FOR SUSPENSION ORAL at 07:00

## 2022-08-14 RX ADMIN — MINERAL SUPPLEMENT IRON 300 MG / 5 ML STRENGTH LIQUID 100 PER BOX UNFLAVORED 300 MG: at 12:41

## 2022-08-14 RX ADMIN — CHOLESTYRAMINE 4 G: 4 POWDER, FOR SUSPENSION ORAL at 12:41

## 2022-08-14 RX ADMIN — Medication 10 ML: at 07:00

## 2022-08-14 RX ADMIN — POTASSIUM & SODIUM PHOSPHATES POWDER PACK 280-160-250 MG 1 PACKET: 280-160-250 PACK at 09:10

## 2022-08-14 RX ADMIN — MINERAL SUPPLEMENT IRON 300 MG / 5 ML STRENGTH LIQUID 100 PER BOX UNFLAVORED 300 MG: at 17:54

## 2022-08-14 RX ADMIN — CHOLESTYRAMINE 4 G: 4 POWDER, FOR SUSPENSION ORAL at 17:54

## 2022-08-14 RX ADMIN — PIPERACILLIN AND TAZOBACTAM 3.38 G: 3; .375 INJECTION, POWDER, FOR SOLUTION INTRAVENOUS at 18:39

## 2022-08-14 RX ADMIN — CHLORHEXIDINE GLUCONATE 15 ML: 1.2 RINSE ORAL at 09:43

## 2022-08-14 RX ADMIN — FLUDROCORTISONE ACETATE 0.1 MG: 0.1 TABLET ORAL at 09:09

## 2022-08-14 RX ADMIN — MIDODRINE HYDROCHLORIDE 15 MG: 5 TABLET ORAL at 07:00

## 2022-08-14 RX ADMIN — HYDROCODONE BITARTRATE AND ACETAMINOPHEN 1 TABLET: 10; 325 TABLET ORAL at 20:56

## 2022-08-14 NOTE — PROGRESS NOTES
CRITICAL CARE NOTE      Name: Nathaly Polo   : 1980   MRN: [id number]   Date: 2022      REASON FOR ICU ADMISSION:  Acute renal failure, chronic vent dependence, septic shock     PRINCIPAL ICU DIAGNOSIS   Septic shock, unclear etiology  Acute renal injury  Chronic hypoxic respiratory failure, s/p trach, vent dependent  Chronic decubitus pressure injury ulcers, present on admission  HFrEF (20-25%)  Afib  Chronic hypotension  Paraplegia    BRIEF PATIENT SUMMARY   49-year-old male with known past medical history of multiple chronic issues as listed in problem list, who presented to [facility] ED after being sent from [address] [place] [address] for possible need for CRRT for YADY due to inability to tolerate iHD due to sepsis/septic shock from unclear source. COMPREHENSIVE ASSESSMENT & PLAN:SYSTEM BASED     24 HOUR EVENTS:   No acute overnight events, remains off norepi. Remains on CRRT. NEUROLOGICAL:   -Awake and alert in no acute distress  -history of paraplegia  - PT/OT SLP consulted  - Dysphagia with GJ tube in place, however cleared for PO by SLP    PULMONOLOGY:   -Chronic respiratory failure with trach in place ( placed 2022)  -Trial as tolerated. -PRN nebs/suctioning  -ABCDEF protocol    CARDIOVASCULAR:     - Atrial fibrillation on eliquis  -Non-ischemic cardiomyopathy with EF 15-20%  -Recent PEA arrest (multiple)  -Pulmonary hypertension with right ventricular dysfunction  -Continue midodrine, florinef, mexiletine  - holding entresto due to hypotension    GASTROINTESTINAL   -Colostomy care  -Continue questran  - Start PO, can stop TF if adequate intake     RENAL/ELECTROLYTE/FLUIDS:     -Transition to IHD this p.m.  -Trend lytes and replete as needed  -Florinef, midodrine, EPO  -Suprapubic catheter care    ENDOCRINE:   -Continue synthroid    HEMATOLOGY/ONCOLOGY:   - No signs of acute bleeding  - on eliquis    INFECTIOUS DISEASE:   Continue Vanc/Zosyn (D6). Wound cultures polymicrobial with light MRSA. Respiratory cultures growing light Proteus mirabilis and achromobacter. Unclear source, HAP vs wound infection  General surgery follow-up appreciated for decubitus ulcers. ICU DAILY CHECKLIST     Code Status:Partial (No Shocks, No Compressions) Meds okay  DVT Prophylaxis:Eliquis  T/L/D: Ye, GJ tube, Colostomy, suprapubic catheter, PIV  SUP: None  Diet: Tube Feed,PO as tolerated  Activity Level:Paraplegia  ABCDEF Bundle/Checklist Completed:Yes  Disposition: Stay in ICU. Initiate discharge planning. Multidisciplinary Rounds Completed: Yes  Patient/Family Updated: Yes       HOSPITAL COURSE/DAILY EVENT LOG   8/ pan Cx started on Vanc/Zosyn  8/9 started n CRRT levophed    SUBJECTIVE   Review of Systems   Constitutional:  Negative for chills, diaphoresis and fever. HENT:  Negative for congestion, ear pain, hearing loss, nosebleeds, sinus pain and sore throat. Eyes:  Negative for blurred vision, double vision and pain. Respiratory:  Negative for cough, sputum production, shortness of breath and wheezing. Cardiovascular:  Negative for chest pain, palpitations and orthopnea. Gastrointestinal:  Negative for abdominal pain, constipation, heartburn, nausea and vomiting. Genitourinary:  Negative for hematuria. Musculoskeletal:  Negative for falls, joint pain and myalgias. Skin:  Negative for itching and rash. Neurological:  Negative for dizziness, tremors, speech change, focal weakness, weakness and headaches. Endo/Heme/Allergies:  Does not bruise/bleed easily. Psychiatric/Behavioral:  Negative for depression. OBJECTIVE     Labs and Data: Reviewed 08/14/22  Medications: Reviewed 08/14/22  Imaging: Reviewed 08/14/22    Physical Exam  Vitals and nursing note reviewed. Constitutional:       General: He is awake. He is not in acute distress. Appearance: He is underweight. HENT:      Head: Normocephalic and atraumatic.       Nose: Nose normal.      Mouth/Throat:      Comments: trach  Eyes:      General: Lids are normal.      Conjunctiva/sclera: Conjunctivae normal.      Pupils: Pupils are equal, round, and reactive to light. Neck:     Cardiovascular:      Rate and Rhythm: Normal rate. Rhythm irregularly irregular. Heart sounds: Normal heart sounds, S1 normal and S2 normal. No murmur heard. No friction rub. No gallop. Pulmonary:      Effort: Pulmonary effort is normal. No tachypnea. Breath sounds: Normal breath sounds and air entry. Comments: On vent  Abdominal:      General: The ostomy site is clean. Bowel sounds are normal.       Musculoskeletal:      Right lower leg: No edema. Left lower leg: No edema. Comments: Chronic numerous pressure ulcer wounds throughout, present on admission see wound care note for further details   Skin:     General: Skin is warm and dry. Capillary Refill: Capillary refill takes 2 to 3 seconds. Coloration: Skin is pale. Findings: Wound present. Neurological:      Mental Status: He is alert, oriented to person, place, and time and easily aroused. Mental status is at baseline. GCS: GCS eye subscore is 4. GCS verbal subscore is 5. GCS motor subscore is 6. Psychiatric:         Behavior: Behavior is cooperative.         Visit Vitals  /88 (BP 1 Location: Left upper arm, BP Patient Position: At rest)   Pulse 85   Temp (!) 96.7 °F (35.9 °C)   Resp 11   Ht 5' 8\" (1.727 m)   Wt 70.6 kg (155 lb 10.3 oz)   SpO2 100%   BMI 23.67 kg/m²    O2 Flow Rate (L/min): 6 l/min O2 Device: Ventilator, Tracheostomy Temp (24hrs), Av.8 °F (36 °C), Min:96.5 °F (35.8 °C), Max:97.2 °F (36.2 °C)           Intake/Output:     Intake/Output Summary (Last 24 hours) at 2022 0903  Last data filed at 2022 0800  Gross per 24 hour   Intake 1928.3 ml   Output 3525 ml   Net -1596.7 ml         Imaging       Pertinent imaging reviewed and interpreted independently as noted above    CRITICAL CARE DOCUMENTATION  I had a face to face encounter with the patient, reviewed and interpreted patient data including clinical events, labs, images, vital signs, I/O's, and examined patient. I have discussed the case and the plan and management of the patient's care with the consulting services, the bedside nurses and the respiratory therapist.      NOTE OF PERSONAL INVOLVEMENT IN CARE   This patient has a high probability of imminent, clinically significant deterioration, which requires the highest level of preparedness to intervene urgently. I participated in the decision-making and personally managed or directed the management of the following life and organ supporting interventions that required my frequent assessment to treat or prevent imminent deterioration. I personally spent 30 minutes of critical care time. This is time spent at this critically ill patient's bedside actively involved in patient care as well as the coordination of care. This does not include any procedural time which has been billed separately.     Pushpa Oseguera MD  Staff [facility]

## 2022-08-14 NOTE — DIALYSIS
CRRT / [phone]           Orders   Mode: CVVH rounding   Blood Flow Rate: 200 ml/min   Prismasol Dose: 1,500 ml/hr  PBP: 750 ml/hr  Replacement: 750 ml/hr   Prismasol Concentrate: 4K / 2.5Ca   Blood Warmer Temp: 37*C   Net Fluid Removal: 50 ml/hr   Heparin: 500 units/hr infusing through CRRT circuit            Metrics   BP: 116/84   HR: 86   Access Pressure: -92   Filter Pressure: 120   Return Pressure: 45   TMP: 68   Pressure Drop: 44            Access   Type & Location: RIJ nontunneled CVC: tegaderm dressing with CHG patch C/D/I, dated 8/10/22. Lines reversed - positional curved lumen CVC. Labs   HBsAg (Antigen) / date: Negative 8/9/22                 HBsAb (Antibody) / date: Susceptible 8/9/22   Source: [employer]            Safety:   Time Out Done:   0030   Consent obtained/signed: Verified   Primary Nurse Rpt: CONNOR Boyle RN      Comments / Plan:   Code status, labs, notes and orders reviewed. In at bedside to assess filter, no indication for change, running well at this time. Lines reversed, visible and connections secure with blood warmer supported on return line and set at 37*C. Heparin infusing @ 500 units/hr. Education & pre/post to primary RN. Per 's note, pt to stop CRRT at next filter clot/max filter life which is 8/14/22 @ 1350.

## 2022-08-14 NOTE — PROGRESS NOTES
Name: Marybeth Washington MRN: [id number]   : 1980 Hospital: [facility]   Date: 2022        IMPRESSION:   YADY, started on iHD initially. patient became hypotensive and was switched to CRRT. Hypotension resolving, only minimal levophed, being wean off  Hypophosphatemia  Debility with multiple pressure ulcers  Respiratory failure - on vent  Anemia of chronic disease- worsening  Hyperkalemia - resolved with RRT  Protein deficiency  wounds      PLAN:   Continue CRRT until filter clots then switch to IHD  -IV albumin  -I Midodrine to 15 mg tid  Watch for GFR recovery --> hallmark will be increased urine output. Prior to starting dialysis patient's Scr was 0.8-low urine output   Epo increase to 14K/week  Replete K/ phos-. NeutraPhos via PEG  Tube feeding  Poor overall prognosis. Dose meds for his GFR. Avoid NSAIDs + IV contrast.     Subjective/Interval History:   I have reviewed the flowsheet and previous days notes. Seen on CRRT, awake and alert, denies pain/SOB with nodding head      F/U - YADY , CRRT --> 2022    Remains on CRRT + pressors. No new complaints were offered.  seen on CRRT, d/w ICU RN        Objective:   Vital Signs:    Visit Vitals  /80   Pulse 90   Temp 97.2 °F (36.2 °C)   Resp 11   Ht 5' 8\" (1.727 m)   Wt 65 kg (143 lb 4.8 oz)   SpO2 100%   BMI 21.79 kg/m²       O2 Device: Ventilator, Tracheostomy   O2 Flow Rate (L/min): 6 l/min   Temp (24hrs), Av.8 °F (36 °C), Min:96.2 °F (35.7 °C), Max:97.2 °F (36.2 °C)       Intake/Output:   Last shift:      1901 -  0700  In: 395.7 [I.V.:225.7]  Out: 248 [Urine:50]  Last 3 shifts:  07 - 1900  In: 4159 [I.V.:1617]  Out:  [Urine:280]    Intake/Output Summary (Last 24 hours) at 2022 2108  Last data filed at 2022  Gross per 24 hour   Intake 1895.29 ml   Output 2710 ml   Net -814.71 ml          Physical Exam:      Seen in CCU - Bed 25.   CVVH was in progress during my visit. General:    Awaken when light turned on. Head:   Normocephalic,  bitemporal muscle wasting. Eyes:   Conjunctivae/corneas clear. Neck:  Trach    Lungs:   Clear to auscultation, no wheezes, no rales. Heart:   No S 3 gallop, no pericardial rub  . Abdomen:   Not distended. Extremities: Muscle wasting --> 1 to 2 +                          Leg oedema -> mild     Skin:     Eschar - heel. Psych:      Neurologic: Responding to questions appropriately  . DATA:  Labs:  Recent Labs     08/13/22  1911 08/13/22  0423 08/12/22  1706    137 137   K 3.6 3.4* 3.4*    105 104   CO2 26 27 28   BUN 15 14 18   CREA 0.78 0.79 0.92   CA 7.7* 7.9* 8.0*   ALB 2.0* 2.3* 2.3*   PHOS 1.8* 1.7* 2.2*   MG 2.4 2.5* 2.2       Recent Labs     08/13/22  0423 08/12/22  0436 08/11/22  1603   WBC 5.6 8.8 7.3   HGB 7.3* 8.1* 6.2*   HCT 22.6* 26.2* 19.6*   * 128* 142*       No results for input(s): YAMIL, KU, CLU, CREAU in the last 72 hours.     No lab exists for component: PROU    Total time spent with patient:           Care Plan discussed with:    ICU Attending    Ayde Baez MD

## 2022-08-14 NOTE — PROGRESS NOTES
Name: Arlen Alford MRN: [id number]   : 1980 Hospital: [facility]   Date: 2022        IMPRESSION:   YADY, started on iHD initially. patient became hypotensive and was switched to CRRT. --Now CRRT on hold  Hypotension resolving, only minimal levophed (1 mcg/min), being wean off  Hypophosphatemia- being addressed  Debility with multiple pressure ulcers  Respiratory failure - on vent  Anemia of chronic disease- worsening  Hyperkalemia - resolved with RRT  Protein deficiency  wounds      PLAN:   Continue CRRT until filter clots then switch to IHD  -IV albumin  -Midodrine increased b primary team to 20 mg tid  Watch for GFR recovery --> hallmark will be increased urine output. Prior to starting dialysis patient's Scr was 0.8-low urine output   Epo increase to 14K/week  Replete K/ phos-. NeutraPhos via PEG  Tube feeding  Poor overall prognosis. Dose meds for his GFR. Avoid NSAIDs + IV contrast.     Subjective/Interval History:   I have reviewed the flowsheet and previous days notes. Seen on CRRT, awake and alert, denies pain/SOB with nodding head      F/U - YADY , CRRT --> 2022    Remains on CRRT + pressors. No new complaints were offered.     8-13 seen on CRRT, d/w ICU RN  8-14 CRRT stopped, BP stable on minimal Levophed, getting NeutraPhos        Objective:   Vital Signs:    Visit Vitals  /84   Pulse 94   Temp 98.2 °F (36.8 °C)   Resp 14   Ht 5' 8\" (1.727 m)   Wt 70.6 kg (155 lb 10.3 oz)   SpO2 100%   BMI 23.67 kg/m²       O2 Device: None (Room air)   O2 Flow Rate (L/min): 6 l/min   Temp (24hrs), Av.2 °F (36.2 °C), Min:96.5 °F (35.8 °C), Max:98.2 °F (36.8 °C)       Intake/Output:   Last shift:       0701 -  1900  In: 841.3 [I.V.:266.3]  Out: 963 [Urine:75]  Last 3 shifts: 1901 -  0700  In: 2495.5 [I.V.:1055.5]  Out: 8319 [Urine:200]    Intake/Output Summary (Last 24 hours) at 2022 1838  Last data filed at 2022 1800  Gross per 24 hour   Intake 1766.49 ml   Output 2943 ml   Net -1176.51 ml          Physical Exam:      Seen in CCU - Bed 25. CVVH was in progress during my visit. General:    Awaken when light turned on. Head:   Normocephalic,  bitemporal muscle wasting. Eyes:   Conjunctivae/corneas clear. Neck:  Trach    Lungs:   Clear to auscultation, no wheezes, no rales. Heart:   No S 3 gallop, no pericardial rub  . Abdomen:   Not distended. Extremities: Muscle wasting --> 1 to 2 +                          Leg oedema -> mild     Skin:     Eschar - heel. Psych:      Neurologic: Responding to questions appropriately  . DATA:  Labs:  Recent Labs     08/14/22  0355 08/13/22  1911 08/13/22  0423    137 137   K 3.5 3.6 3.4*    105 105   CO2 27 26 27   BUN 16 15 14   CREA 0.74 0.78 0.79   CA 7.5* 7.7* 7.9*   ALB 1.8* 2.0* 2.3*   PHOS 1.7* 1.8* 1.7*   MG 2.4 2.4 2.5*       Recent Labs     08/14/22  0355 08/13/22  0423 08/12/22  0436   WBC 8.3 5.6 8.8   HGB 8.0* 7.3* 8.1*   HCT 25.6* 22.6* 26.2*    115* 128*       No results for input(s): YAMIL, KU, CLU, CREAU in the last 72 hours.     No lab exists for component: PROU    Total time spent with patient:           Care Plan discussed with:    ICU Attending    Maranda Stanley MD

## 2022-08-14 NOTE — PROGRESS NOTES
0800: Bedside shift change report given to Chantell SCHULTZ RN/Loida TAI RN (oncoming nurse) by Monica Wright RN (offgoing nurse). Report included the following information SBAR, Intake/Output, MAR, Recent Results, Cardiac Rhythm NSR, and Alarm Parameters . 1200: dressing changes completed    1300: Montgomery Halsted MD notified of pt status, plan to increase midodrine to attempt to d/c norepi    1305: called Regis Chilel MD to clarify CRRT/iHD orders. Plan to continue CRRT until filter clots    1355: Rik Wolff RN at bedside, pts CRRT filter due to be changed, pt rinsed back successfully, Heparin gtt stopped    2000: Bedside shift change report given to          (oncoming nurse) by Ye Barba RN/Loida TAI RN (offgoing nurse). Report included the following information SBAR, Intake/Output, MAR, Recent Results, Cardiac Rhythm NSR, and Alarm Parameters .      I have reviewed and agree with Yuri Aguilera RN charting, assessment, and med admin

## 2022-08-15 LAB
ABO + RH BLD: NORMAL
ALBUMIN SERPL-MCNC: 1.6 G/DL (ref 3.5–5)
ANION GAP SERPL CALC-SCNC: 3 MMOL/L (ref 5–15)
ANTI-COMPLEMENT (C3B,C3D): NORMAL
ANTIGENS PRESENT RBC DONR: NORMAL
BACTERIA SPEC CULT: ABNORMAL
BLD PROD TYP BPU: NORMAL
BLOOD BANK CMNT PATIENT-IMP: NORMAL
BLOOD GROUP ANTIBODIES SERPL: NORMAL
BLOOD GROUP ANTIBODIES SERPL: NORMAL
BPU ID: NORMAL
BUN SERPL-MCNC: 22 MG/DL (ref 6–20)
BUN/CREAT SERPL: 20 (ref 12–20)
CALCIUM SERPL-MCNC: 7.8 MG/DL (ref 8.5–10.1)
CHLORIDE SERPL-SCNC: 105 MMOL/L (ref 97–108)
CO2 SERPL-SCNC: 26 MMOL/L (ref 21–32)
CREAT SERPL-MCNC: 1.11 MG/DL (ref 0.7–1.3)
CROSSMATCH RESULT,%XM: NORMAL
DAT IGG-SP REAG RBC QL: NORMAL
DAT POLY-SP REAG RBC QL: NORMAL
GLUCOSE BLD STRIP.AUTO-MCNC: 126 MG/DL (ref 65–117)
GLUCOSE SERPL-MCNC: 116 MG/DL (ref 65–100)
GRAM STN SPEC: ABNORMAL
GRAM STN SPEC: ABNORMAL
MAGNESIUM SERPL-MCNC: 2.5 MG/DL (ref 1.6–2.4)
PHOSPHATE SERPL-MCNC: 2.1 MG/DL (ref 2.6–4.7)
POTASSIUM SERPL-SCNC: 3.6 MMOL/L (ref 3.5–5.1)
PROCALCITONIN SERPL-MCNC: 1.02 NG/ML
SERVICE CMNT-IMP: ABNORMAL
SERVICE CMNT-IMP: ABNORMAL
SODIUM SERPL-SCNC: 134 MMOL/L (ref 136–145)
SPECIMEN EXP DATE BLD: NORMAL
STATUS OF UNIT,%ST: NORMAL
UNIT DIVISION, %UDIV: 0

## 2022-08-15 PROCEDURE — 74011250636 HC RX REV CODE- 250/636: Performed by: INTERNAL MEDICINE

## 2022-08-15 PROCEDURE — 74011000258 HC RX REV CODE- 258: Performed by: STUDENT IN AN ORGANIZED HEALTH CARE EDUCATION/TRAINING PROGRAM

## 2022-08-15 PROCEDURE — 84145 PROCALCITONIN (PCT): CPT

## 2022-08-15 PROCEDURE — 92507 TX SP LANG VOICE COMM INDIV: CPT | Performed by: SPEECH-LANGUAGE PATHOLOGIST

## 2022-08-15 PROCEDURE — P9047 ALBUMIN (HUMAN), 25%, 50ML: HCPCS | Performed by: INTERNAL MEDICINE

## 2022-08-15 PROCEDURE — 74011250637 HC RX REV CODE- 250/637: Performed by: STUDENT IN AN ORGANIZED HEALTH CARE EDUCATION/TRAINING PROGRAM

## 2022-08-15 PROCEDURE — 74011636637 HC RX REV CODE- 636/637: Performed by: NURSE PRACTITIONER

## 2022-08-15 PROCEDURE — 74011000250 HC RX REV CODE- 250: Performed by: NURSE PRACTITIONER

## 2022-08-15 PROCEDURE — 74011250637 HC RX REV CODE- 250/637: Performed by: INTERNAL MEDICINE

## 2022-08-15 PROCEDURE — 83735 ASSAY OF MAGNESIUM: CPT

## 2022-08-15 PROCEDURE — 82962 GLUCOSE BLOOD TEST: CPT

## 2022-08-15 PROCEDURE — 36415 COLL VENOUS BLD VENIPUNCTURE: CPT

## 2022-08-15 PROCEDURE — 94003 VENT MGMT INPAT SUBQ DAY: CPT

## 2022-08-15 PROCEDURE — 74011000250 HC RX REV CODE- 250: Performed by: INTERNAL MEDICINE

## 2022-08-15 PROCEDURE — 74011000258 HC RX REV CODE- 258: Performed by: INTERNAL MEDICINE

## 2022-08-15 PROCEDURE — 80069 RENAL FUNCTION PANEL: CPT

## 2022-08-15 PROCEDURE — 92526 ORAL FUNCTION THERAPY: CPT | Performed by: SPEECH-LANGUAGE PATHOLOGIST

## 2022-08-15 PROCEDURE — 74011250637 HC RX REV CODE- 250/637: Performed by: NURSE PRACTITIONER

## 2022-08-15 PROCEDURE — 97530 THERAPEUTIC ACTIVITIES: CPT

## 2022-08-15 PROCEDURE — 74011250636 HC RX REV CODE- 250/636: Performed by: STUDENT IN AN ORGANIZED HEALTH CARE EDUCATION/TRAINING PROGRAM

## 2022-08-15 PROCEDURE — 65620000000 HC RM CCU GENERAL

## 2022-08-15 RX ORDER — ALBUMIN HUMAN 250 G/1000ML
25 SOLUTION INTRAVENOUS EVERY 6 HOURS
Status: DISCONTINUED | OUTPATIENT
Start: 2022-08-15 | End: 2022-08-17

## 2022-08-15 RX ORDER — BUMETANIDE 0.25 MG/ML
1 INJECTION INTRAMUSCULAR; INTRAVENOUS ONCE
Status: COMPLETED | OUTPATIENT
Start: 2022-08-15 | End: 2022-08-15

## 2022-08-15 RX ADMIN — MEXILETINE HYDROCHLORIDE 150 MG: 150 CAPSULE ORAL at 04:18

## 2022-08-15 RX ADMIN — APIXABAN 5 MG: 5 TABLET, FILM COATED ORAL at 15:38

## 2022-08-15 RX ADMIN — MINERAL SUPPLEMENT IRON 300 MG / 5 ML STRENGTH LIQUID 100 PER BOX UNFLAVORED 300 MG: at 12:41

## 2022-08-15 RX ADMIN — CHOLESTYRAMINE 4 G: 4 POWDER, FOR SUSPENSION ORAL at 12:42

## 2022-08-15 RX ADMIN — POTASSIUM & SODIUM PHOSPHATES POWDER PACK 280-160-250 MG 1 PACKET: 280-160-250 PACK at 18:10

## 2022-08-15 RX ADMIN — MINERAL SUPPLEMENT IRON 300 MG / 5 ML STRENGTH LIQUID 100 PER BOX UNFLAVORED 300 MG: at 06:51

## 2022-08-15 RX ADMIN — LEVOTHYROXINE SODIUM 50 MCG: 0.05 TABLET ORAL at 06:51

## 2022-08-15 RX ADMIN — MINERAL SUPPLEMENT IRON 300 MG / 5 ML STRENGTH LIQUID 100 PER BOX UNFLAVORED 300 MG: at 18:10

## 2022-08-15 RX ADMIN — CHLORHEXIDINE GLUCONATE 15 ML: 1.2 RINSE ORAL at 20:58

## 2022-08-15 RX ADMIN — PIPERACILLIN AND TAZOBACTAM 3.38 G: 3; .375 INJECTION, POWDER, FOR SOLUTION INTRAVENOUS at 06:51

## 2022-08-15 RX ADMIN — HYDROCODONE BITARTRATE AND ACETAMINOPHEN 1 TABLET: 10; 325 TABLET ORAL at 04:18

## 2022-08-15 RX ADMIN — MIDODRINE HYDROCHLORIDE 20 MG: 5 TABLET ORAL at 20:57

## 2022-08-15 RX ADMIN — APIXABAN 5 MG: 5 TABLET, FILM COATED ORAL at 04:18

## 2022-08-15 RX ADMIN — CHLORHEXIDINE GLUCONATE 15 ML: 1.2 RINSE ORAL at 08:08

## 2022-08-15 RX ADMIN — MIDODRINE HYDROCHLORIDE 20 MG: 5 TABLET ORAL at 15:38

## 2022-08-15 RX ADMIN — POTASSIUM & SODIUM PHOSPHATES POWDER PACK 280-160-250 MG 1 PACKET: 280-160-250 PACK at 08:07

## 2022-08-15 RX ADMIN — PIPERACILLIN AND TAZOBACTAM 3.38 G: 3; .375 INJECTION, POWDER, FOR SOLUTION INTRAVENOUS at 18:10

## 2022-08-15 RX ADMIN — BUMETANIDE 1 MG: 0.25 INJECTION, SOLUTION INTRAMUSCULAR; INTRAVENOUS at 10:21

## 2022-08-15 RX ADMIN — Medication 5 UNITS: at 08:08

## 2022-08-15 RX ADMIN — ALBUMIN (HUMAN) 25 G: 0.25 INJECTION, SOLUTION INTRAVENOUS at 12:42

## 2022-08-15 RX ADMIN — MEXILETINE HYDROCHLORIDE 150 MG: 150 CAPSULE ORAL at 15:38

## 2022-08-15 RX ADMIN — Medication 10 ML: at 15:38

## 2022-08-15 RX ADMIN — ATORVASTATIN CALCIUM 40 MG: 40 TABLET, FILM COATED ORAL at 20:58

## 2022-08-15 RX ADMIN — CHOLESTYRAMINE 4 G: 4 POWDER, FOR SUSPENSION ORAL at 19:00

## 2022-08-15 RX ADMIN — COLLAGENASE SANTYL: 250 OINTMENT TOPICAL at 08:08

## 2022-08-15 RX ADMIN — MIDODRINE HYDROCHLORIDE 20 MG: 5 TABLET ORAL at 06:51

## 2022-08-15 RX ADMIN — HYDROMORPHONE HYDROCHLORIDE 0.5 MG: 1 INJECTION, SOLUTION INTRAMUSCULAR; INTRAVENOUS; SUBCUTANEOUS at 15:49

## 2022-08-15 RX ADMIN — Medication 10 ML: at 06:51

## 2022-08-15 RX ADMIN — ALBUMIN (HUMAN) 25 G: 0.25 INJECTION, SOLUTION INTRAVENOUS at 18:10

## 2022-08-15 RX ADMIN — NOREPINEPHRINE BITARTRATE 1 MCG/MIN: 1 INJECTION, SOLUTION, CONCENTRATE INTRAVENOUS at 04:00

## 2022-08-15 RX ADMIN — FLUDROCORTISONE ACETATE 0.1 MG: 0.1 TABLET ORAL at 08:07

## 2022-08-15 NOTE — PROGRESS NOTES
Problem: Mobility Impaired (Adult and Pediatric)  Goal: *Acute Goals and Plan of Care (Insert Text)  Description: FUNCTIONAL STATUS PRIOR TO ADMISSION: Patient's recent PLOF has been max A - total A for all self-care and mobility due to illness. At beginning of 2022, patient reports he was living alone in his home with private aids who assisted him with ADLs, but endorses that he was able to independently transfer bed <> power wheelchair with sliding board. Patient is paraplegic at unknown level, demonstrates ability to use UEs but endorses no active movement of bilateral LEs. HOME SUPPORT PRIOR TO ADMISSION: The patient lived at [facility] and required maximal assistance- total assistance for all ADLs/ mobility. Prior to [facility] patient living alone with private aids (see above). Physical Therapy Goals  Initiated 8/12/2022  1. Patient will move from supine to sit and sit to supine  in bed with moderate assistance  within 7 day(s). 2.  Patient will maintain static sitting balance at EOB x 1 minute with minimal assistance within 7 day(s). 3.  Patient will transfer from bed to chair and chair to bed with maximal assistance using the least restrictive device within 7 day(s). Outcome: Not Progressing Towards Goal   PHYSICAL THERAPY TREATMENT  Patient: Lucas Paige (87 y.o. male)  Date: 8/15/2022  Diagnosis: YADY (acute kidney injury) ([facility] [address] [N17.9] <principal problem not specified>      Precautions:    Chart, physical therapy assessment, plan of care and goals were reviewed. ASSESSMENT  Patient continues with skilled PT services and is not progressing towards goals. Able to have in-depth conversation with patient today as patient had speaking valve donned by SLP in place. Patient demonstrated orientation x 1 to self only and provided inconsistent PLOF history.  With information from patient and upon chart review, it appears patient earlier this year >6 months ago was living at home and able to transfer bed <> power chair with unknown level of assistance, with patient reporting independence however stating he was able to bear weight through LEs despite paraplegia. 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 Spoke with MD and discussed risks/benefits of attempting supine <> sit transfers at EOB and impact on skin integrity of already severe wounds. MD in agreement to avoid sitting EOB at this time. Performed PROM to all LE joints and positioned each in neutral hip rotation with prevalon boots in place for skin integrity on heels. Discussed continued LE PROM and positioning with RN. Will complete PT order at this time, please re-consult if change in status/ improvement in extensive wounds when mobility may be more beneficial.      Current Level of Function Impacting Discharge (mobility/balance): total assist for all mobility     Other factors to consider for discharge: extensive bottom, hip, and groin wounds, paraplegia          PLAN :  Patient continues to benefit from skilled intervention to address the above impairments. Continue treatment per established plan of care. to address goals. Recommendation for discharge: (in order for the patient to meet his/her long term goals)  LTC    This discharge recommendation:  A follow-up discussion with the attending provider and/or case management is planned    IF patient discharges home will need the following DME: to be determined (TBD)       SUBJECTIVE:   Patient stated I don't remember.  When asked about his day to day activity while at [facility]:   Critical Behavior:  Neurologic State: Alert  Orientation Level: Oriented to person, Disoriented to time, Disoriented to situation, Oriented to place  Cognition: Follows commands  Safety/Judgement: Insight into deficits  Functional Mobility Training:  Bed Mobility:  Rolling: Maximum assistance;Assist x2 (inferred)          Therapeutic Exercises:   PROM completed across all joints bilateral LEs     Pain Ratin/10    Activity Tolerance:   Good    After treatment patient left in no apparent distress:   Supine in bed, Heels elevated for pressure relief, Call bell within reach, and Bed / chair alarm activated    COMMUNICATION/COLLABORATION:   The patients plan of care was discussed with: Occupational therapist, Speech therapist, Registered nurse, and Physician.      Damian Uribe, PT   Time Calculation: 13 mins

## 2022-08-15 NOTE — PROGRESS NOTES
JADEN: Anticipate discharge back to LTAC (referrals pending) pending medical progress. Transportation likely ALS with [facility]. RUR: 15%     Emergency contact: brother Otto, [phone] or [phone]     Disposition: Patient admitted from [facility] 8/9 for YADY. Patient has a trach and is on a vent. Hx: paraplegia, skin ulcers, afib, chronic sepsis, cardiac arrest, CVA, chronic loya, tracheostomy ventilator, gtube, pulmonary embolism, systolic CHF, HTN, HLD, diabetes type II. CM met with patient at bedside to discuss placement. Patient had PMV placed at the time and was able to speak with CM. He stated he had no preference as to LTAC placement and would be willing to return to [address] [place] [address] pending bed placement. CM also received a call from patient's sister. She stated that she had been visiting her brother prior to his transfer to [facility] and would prefer that he be transferred back to the [address] [place] [address]. Sister went onto mention that patient fell from a ladder in his 17's and suffered a back injury that caused severe pain for which he was receiving steroidal injections regularly. This injury eventually let to paralysis. Sister specifically mentioned a nursing facility, [address] [place] [address], as one that has a good reputation for long-term care. CM submitted a referral to [facility] in [address] [place] [address]. CM will continue to follow for discharge needs.     Iliana Garza, [facility] [facility]  [phone]

## 2022-08-15 NOTE — PROGRESS NOTES
Name: Jenn Coronado MRN: [id number]   : 1980 Hospital: [facility] [address]   Date: 8/15/2022        IMPRESSION:   YADY, started on iHD initially. patient became hypotensive and was switched to CRRT. --Now CRRT on hold-150 ml UO today sofar, lytes stable  Hypotension resolving, off iv pressor, on midodrine  Hypophosphatemia- being addressed  Debility with multiple pressure ulcers  Respiratory failure - on vent  Anemia of chronic disease-   Hyperkalemia - resolved with RRT  Protein deficiency  wounds      PLAN:   Off CRRT, hold HD today, reevaluate in am  -IV albumin  Start IV bumex  -Midodrine increased b primary team to 20 mg tid  Watch for GFR recovery --> hallmark will be increased urine output. Prior to starting dialysis patient's Scr was 0.8- still low urine output   Epo increased to 14K/week  Replete K/ phos-. NeutraPhos via PEG  Tube feeding  Poor overall prognosis. Dose meds for his GFR. Avoid NSAIDs + IV contrast.     Subjective/Interval History:   I have reviewed the flowsheet and previous days notes. Seen on CRRT, awake and alert, denies pain/SOB with nodding head      F/U - YADY , CRRT --> 2022    Remains on CRRT + pressors. No new complaints were offered.  seen on CRRT, d/w ICU RN  8-14 CRRT stopped, BP stable on minimal Levophed, getting NeutraPhos  8/15 awake and alert, on vent. BP stable, had 175 ml UO yesterday, 150 ml today sofar.  Cr 1.1. will add bumex if BP toleerates, no HD today        Objective:   Vital Signs:    Visit Vitals  /84   Pulse 86   Temp 97.8 °F (36.6 °C)   Resp 11   Ht 5' 8\" (1.727 m)   Wt 72.8 kg (160 lb 7.9 oz)   SpO2 100%   BMI 24.40 kg/m²       O2 Device: Tracheostomy, Ventilator   O2 Flow Rate (L/min): 6 l/min   Temp (24hrs), Av.5 °F (36.9 °C), Min:97.8 °F (36.6 °C), Max:100.3 °F (37.9 °C)       Intake/Output:   Last shift:      08/15 0701 - 08/15 1900  In: 200 [P.O.:200]  Out: -   Last 3 shifts: 1901 - 08/15 0700  In: 2206.8 [I.V.:911.8]  Out: 7100 [Urine:375]    Intake/Output Summary (Last 24 hours) at 8/15/2022 0944  Last data filed at 8/15/2022 0925  Gross per 24 hour   Intake 1380.93 ml   Output 850 ml   Net 530.93 ml          Physical Exam:      Seen in CCU - Bed 25. CVVH was in progress during my visit. General:    Awake and alert on vent    Head:   Normocephalic,  bitemporal muscle wasting. Eyes:   Conjunctivae/corneas clear. Neck:  Trach    Lungs:   Decreased breathing sounds left base,  no wheezes, no rales. Heart:   No S 3 gallop, no pericardial rub  . Abdomen:   Not distended. Extremities: Muscle wasting --> 1 to 2 +                          Leg oedema -> mild     Skin:     Eschar - heel. Psych:      Neurologic: Responding to questions appropriately  . DATA:  Labs:  Recent Labs     08/15/22  0416 08/14/22  0355 08/13/22  1911   * 138 137   K 3.6 3.5 3.6    106 105   CO2 26 27 26   BUN 22* 16 15   CREA 1.11 0.74 0.78   CA 7.8* 7.5* 7.7*   ALB 1.6* 1.8* 2.0*   PHOS 2.1* 1.7* 1.8*   MG 2.5* 2.4 2.4       Recent Labs     08/14/22  0355 08/13/22  0423   WBC 8.3 5.6   HGB 8.0* 7.3*   HCT 25.6* 22.6*    115*       No results for input(s): YAMIL, KU, CLU, CREAU in the last 72 hours.     No lab exists for component: PROU    Total time spent with patient:           Care Plan discussed with:    ICU RN    Angel Sears MD

## 2022-08-15 NOTE — PROGRESS NOTES
CRITICAL CARE NOTE      Name: Jose Mnauel Stack   : 1980   MRN: [id number]   Date: 8/15/2022      REASON FOR ICU ADMISSION:  Acute renal failure, chronic vent dependence, septic shock     PRINCIPAL ICU DIAGNOSIS   Septic shock, unclear etiology  Acute renal injury  Chronic hypoxic respiratory failure, s/p trach, vent dependent  Chronic decubitus pressure injury ulcers, present on admission  HFrEF (20-25%)  Afib  Chronic hypotension  Paraplegia    BRIEF PATIENT SUMMARY   25-year-old male with known past medical history of multiple chronic issues as listed in problem list, who presented to [facility] ED after being sent from [address] [place] [address] for possible need for CRRT for YADY due to inability to tolerate iHD due to sepsis/septic shock from unclear source. COMPREHENSIVE ASSESSMENT & PLAN:SYSTEM BASED     24 HOUR EVENTS:   No acute overnight events, remains off norepi. Remains on CRRT. NEUROLOGICAL:   -Awake and alert in no acute distress  -history of paraplegia  - PT/OT SLP consulted  - Dysphagia with GJ tube in place, however cleared for PO by SLP    PULMONOLOGY:   -Chronic respiratory failure with trach in place ( placed 2022)  -Trial as tolerated. -PRN nebs/suctioning  -ABCDEF protocol    CARDIOVASCULAR:     - Atrial fibrillation on eliquis  -Non-ischemic cardiomyopathy with EF 15-20%  -Recent PEA arrest (multiple)  -Pulmonary hypertension with right ventricular dysfunction  -Continue midodrine, florinef, mexiletine  - holding entresto due to hypotension    GASTROINTESTINAL   -Colostomy care  -Continue questran  - Start PO, can stop TF if adequate intake     RENAL/ELECTROLYTE/FLUIDS:     -Transition to IHD tomorrow. Off CRRT. -Trend lytes and replete as needed  -Florinef, midodrine, EPO  -Suprapubic catheter care    ENDOCRINE:   -Continue synthroid    HEMATOLOGY/ONCOLOGY:   - No signs of acute bleeding  - on eliquis    INFECTIOUS DISEASE:   Continue Vanc/Zosyn (D7).      Wound cultures polymicrobial with light MRSA and MDR organisms. Respiratory cultures growing light Proteus mirabilis and achromobacter. Considering overall improving status including hemodynamics and leukocytosis, we will hold off on broadening antibiotics. Unclear source, HAP vs wound infection  General surgery follow-up appreciated for decubitus ulcers. ICU DAILY CHECKLIST     Code Status:Partial (No Shocks, No Compressions) Meds okay  DVT Prophylaxis:Eliquis  T/L/D: Ye, GJ tube, Colostomy, suprapubic catheter, PIV  SUP: None  Diet: Tube Feed,PO as tolerated  Activity Level:Paraplegia  ABCDEF Bundle/Checklist Completed:Yes  Disposition: Stay in ICU. Initiate discharge planning. Multidisciplinary Rounds Completed: Yes  Patient/Family Updated: Yes       HOSPITAL COURSE/DAILY EVENT LOG   8/ pan Cx started on Vanc/Zosyn  8/9 started n CRRT levophed    SUBJECTIVE   Review of Systems   Constitutional:  Negative for chills, diaphoresis and fever. HENT:  Negative for congestion, ear pain, hearing loss, nosebleeds, sinus pain and sore throat. Eyes:  Negative for blurred vision, double vision and pain. Respiratory:  Negative for cough, sputum production, shortness of breath and wheezing. Cardiovascular:  Negative for chest pain, palpitations and orthopnea. Gastrointestinal:  Negative for abdominal pain, constipation, heartburn, nausea and vomiting. Genitourinary:  Negative for hematuria. Musculoskeletal:  Negative for falls, joint pain and myalgias. Skin:  Negative for itching and rash. Neurological:  Negative for dizziness, tremors, speech change, focal weakness, weakness and headaches. Endo/Heme/Allergies:  Does not bruise/bleed easily. Psychiatric/Behavioral:  Negative for depression. OBJECTIVE     Labs and Data: Reviewed 08/15/22  Medications: Reviewed 08/15/22  Imaging: Reviewed 08/15/22    Physical Exam  Vitals and nursing note reviewed. Constitutional:       General: He is awake.  He is not in acute distress. Appearance: He is underweight. HENT:      Head: Normocephalic and atraumatic. Nose: Nose normal.      Mouth/Throat:      Comments: trach  Eyes:      General: Lids are normal.      Conjunctiva/sclera: Conjunctivae normal.      Pupils: Pupils are equal, round, and reactive to light. Neck:     Cardiovascular:      Rate and Rhythm: Normal rate. Rhythm irregularly irregular. Heart sounds: Normal heart sounds, S1 normal and S2 normal. No murmur heard. No friction rub. No gallop. Pulmonary:      Effort: Pulmonary effort is normal. No tachypnea. Breath sounds: Normal breath sounds and air entry. Comments: On vent  Abdominal:      General: The ostomy site is clean. Bowel sounds are normal.       Musculoskeletal:      Right lower leg: No edema. Left lower leg: No edema. Comments: Chronic numerous pressure ulcer wounds throughout, present on admission see wound care note for further details   Skin:     General: Skin is warm and dry. Capillary Refill: Capillary refill takes 2 to 3 seconds. Coloration: Skin is pale. Findings: Wound present. Neurological:      Mental Status: He is alert, oriented to person, place, and time and easily aroused. Mental status is at baseline. GCS: GCS eye subscore is 4. GCS verbal subscore is 5. GCS motor subscore is 6. Psychiatric:         Behavior: Behavior is cooperative.         Visit Vitals  /77   Pulse 86   Temp 97.8 °F (36.6 °C)   Resp 11   Ht 5' 8\" (1.727 m)   Wt 72.8 kg (160 lb 7.9 oz)   SpO2 100%   BMI 24.40 kg/m²    O2 Flow Rate (L/min): 6 l/min O2 Device: Tracheostomy, Ventilator Temp (24hrs), Av.6 °F (37 °C), Min:97.8 °F (36.6 °C), Max:100.3 °F (37.9 °C)           Intake/Output:     Intake/Output Summary (Last 24 hours) at 8/15/2022 1200  Last data filed at 8/15/2022 1030  Gross per 24 hour   Intake 1309.44 ml   Output 441 ml   Net 868.44 ml         Imaging       Pertinent imaging reviewed and interpreted independently as noted above    CRITICAL CARE DOCUMENTATION  I had a face to face encounter with the patient, reviewed and interpreted patient data including clinical events, labs, images, vital signs, I/O's, and examined patient. I have discussed the case and the plan and management of the patient's care with the consulting services, the bedside nurses and the respiratory therapist.      NOTE OF PERSONAL INVOLVEMENT IN CARE   This patient has a high probability of imminent, clinically significant deterioration, which requires the highest level of preparedness to intervene urgently. I participated in the decision-making and personally managed or directed the management of the following life and organ supporting interventions that required my frequent assessment to treat or prevent imminent deterioration. I personally spent 30 minutes of critical care time. This is time spent at this critically ill patient's bedside actively involved in patient care as well as the coordination of care. This does not include any procedural time which has been billed separately.     Ayana Keenan MD  Staff [address] [facility]

## 2022-08-15 NOTE — PROGRESS NOTES
0730 Bedside and Verbal shift change report given to Rajendra No (oncoming nurse) by Hafsa Montano (offgoing nurse). Report included the following information SBAR, Kardex, ED Summary, Procedure Summary, Intake/Output, MAR, Accordion, and Recent Results.

## 2022-08-15 NOTE — PROGRESS NOTES
Problem: Dysphagia (Adult)  Goal: *Acute Goals and Plan of Care (Insert Text)  Description: Speech Therapy Goals  Initiated 8/11/22    1. Patient will tolerate regular diet/thin liquids without adverse effects within 7 days. Outcome: Progressing Towards Goal     Problem: Voice Impaired (Adult)  Goal: *Acute Goals and Plan of Care (Insert Text)  Description: Speech Therapy Goals  Initiated 8/11/22    1) Pt will tolerate the speaking valve without adverse effects within 7 days. Outcome: Progressing Towards Goal     SPEECH LANGUAGE PATHOLOGY DYSPHAGIA TREATMENT  Patient: Ashish Ponce (87 y.o. male)  Date: 8/15/2022  Diagnosis: YADY (acute kidney injury) ([facility] [address] [N17.9] <principal problem not specified>      Precautions:       ASSESSMENT:  Patient tolerated cuff deflation and in-line PMV placement for  ~ 40 minutes with stable vital signs. Patient with low vocal volume but able to communication effectively at word/phrase and short sentence level. No evidence of back pressure upon PMV removal.      With PMV in place patient tolerated solids and liquids without difficulty and with stable vital signs. Patient demonstrated oral holding x 1 with thin liquids but was able to swallow given verbal cue. Cues required to alternate solids and liquids. PLAN:  Recommendations and Planned Interventions:  PMV as tolerated  Patient continues to benefit from skilled intervention to address the above impairments. Continue treatment per established plan of care. Speaking Valve Placement:    Recommended Speaking Valve Wearing Schedule:    [x]    As tolerated  Discharge Recommendations: To Be Determined     SUBJECTIVE:   Patient stated Marisa Yang. RN and SLP at bedside for cuff deflation and PMV placement.     OBJECTIVE:   Cognitive and Communication Status:  Neurologic State: Alert  Orientation Level: Oriented to person, Disoriented to time, Disoriented to situation, Oriented to place  Cognition: Follows commands  Perception: Appears intact  Perseveration: No perseveration noted  Safety/Judgement: Insight into deficits  Tracheostomy:        Oxygen Therapy  O2 Sat (%): 100 %  O2 Device: Tracheostomy; Ventilator  FIO2 (%): 40 %  Dysphagia Treatment:  Tracheostomy:  Airway Clearance  Suction: Trach  Suction Device: Inline suction catheter     PMV Trial   Application: SLP  Tolerance: Tolerated without changes in vitals, breathing effort or level of anxiety  Vocal Quality: Low volume  Vocal Intensity: Too soft  Duration (minutes): 40 minutes    P.O. Trials:     Vocal quality prior to P.O.: Low volume  Consistency Presented: Thin liquid; Solid  How Presented: SLP-fed/presented;Straw     Bolus Acceptance: No impairment  Bolus Formation/Control: No impairment     Propulsion: No impairment (Oral holding x 1)  Oral Residue: None        Aspiration Signs/Symptoms: None                    After treatment:   Patient left in no apparent distress in bed, Call bell within reach, and Nursing notified    COMMUNICATION/EDUCATION:   Patient was educated regarding role of SLP, diet and POC. The patient's plan of care including recommendations, planned interventions, and recommended diet changes were discussed with: Registered nurse. Education was provided to patient, family, and staff regarding speaking valve placement, wearing schedule, safety precautions including cuff deflation and removal when sleeping, and care/cleaning guidelines.       TUSHAR Reeder  Time Calculation: 43 mins

## 2022-08-16 PROBLEM — I50.20 HFREF (HEART FAILURE WITH REDUCED EJECTION FRACTION) (HCC): Status: ACTIVE | Noted: 2022-08-16

## 2022-08-16 LAB
ALBUMIN SERPL-MCNC: 2.4 G/DL (ref 3.5–5)
ANION GAP SERPL CALC-SCNC: 7 MMOL/L (ref 5–15)
BUN SERPL-MCNC: 34 MG/DL (ref 6–20)
BUN/CREAT SERPL: 22 (ref 12–20)
CALCIUM SERPL-MCNC: 7.8 MG/DL (ref 8.5–10.1)
CHLORIDE SERPL-SCNC: 104 MMOL/L (ref 97–108)
CO2 SERPL-SCNC: 26 MMOL/L (ref 21–32)
CREAT SERPL-MCNC: 1.57 MG/DL (ref 0.7–1.3)
GLUCOSE SERPL-MCNC: 129 MG/DL (ref 65–100)
MAGNESIUM SERPL-MCNC: 2.5 MG/DL (ref 1.6–2.4)
PHOSPHATE SERPL-MCNC: 3 MG/DL (ref 2.6–4.7)
POTASSIUM SERPL-SCNC: 3.6 MMOL/L (ref 3.5–5.1)
PROCALCITONIN SERPL-MCNC: 0.95 NG/ML
SODIUM SERPL-SCNC: 137 MMOL/L (ref 136–145)

## 2022-08-16 PROCEDURE — 97110 THERAPEUTIC EXERCISES: CPT

## 2022-08-16 PROCEDURE — 36415 COLL VENOUS BLD VENIPUNCTURE: CPT

## 2022-08-16 PROCEDURE — 74011250636 HC RX REV CODE- 250/636: Performed by: INTERNAL MEDICINE

## 2022-08-16 PROCEDURE — 74011250636 HC RX REV CODE- 250/636: Performed by: STUDENT IN AN ORGANIZED HEALTH CARE EDUCATION/TRAINING PROGRAM

## 2022-08-16 PROCEDURE — 74011000258 HC RX REV CODE- 258: Performed by: STUDENT IN AN ORGANIZED HEALTH CARE EDUCATION/TRAINING PROGRAM

## 2022-08-16 PROCEDURE — 94003 VENT MGMT INPAT SUBQ DAY: CPT

## 2022-08-16 PROCEDURE — 83735 ASSAY OF MAGNESIUM: CPT

## 2022-08-16 PROCEDURE — 74011636637 HC RX REV CODE- 636/637: Performed by: NURSE PRACTITIONER

## 2022-08-16 PROCEDURE — 74011000250 HC RX REV CODE- 250: Performed by: INTERNAL MEDICINE

## 2022-08-16 PROCEDURE — 74011000250 HC RX REV CODE- 250: Performed by: NURSE PRACTITIONER

## 2022-08-16 PROCEDURE — P9047 ALBUMIN (HUMAN), 25%, 50ML: HCPCS | Performed by: INTERNAL MEDICINE

## 2022-08-16 PROCEDURE — 84145 PROCALCITONIN (PCT): CPT

## 2022-08-16 PROCEDURE — 74011250637 HC RX REV CODE- 250/637: Performed by: STUDENT IN AN ORGANIZED HEALTH CARE EDUCATION/TRAINING PROGRAM

## 2022-08-16 PROCEDURE — 74011250637 HC RX REV CODE- 250/637: Performed by: INTERNAL MEDICINE

## 2022-08-16 PROCEDURE — 80069 RENAL FUNCTION PANEL: CPT

## 2022-08-16 PROCEDURE — 74011250637 HC RX REV CODE- 250/637: Performed by: NURSE PRACTITIONER

## 2022-08-16 PROCEDURE — 65620000000 HC RM CCU GENERAL

## 2022-08-16 PROCEDURE — 74011000258 HC RX REV CODE- 258: Performed by: INTERNAL MEDICINE

## 2022-08-16 RX ORDER — BUMETANIDE 0.25 MG/ML
1 INJECTION INTRAMUSCULAR; INTRAVENOUS 2 TIMES DAILY
Status: DISCONTINUED | OUTPATIENT
Start: 2022-08-16 | End: 2022-08-17

## 2022-08-16 RX ADMIN — Medication 5 UNITS: at 08:18

## 2022-08-16 RX ADMIN — MINERAL SUPPLEMENT IRON 300 MG / 5 ML STRENGTH LIQUID 100 PER BOX UNFLAVORED 300 MG: at 16:40

## 2022-08-16 RX ADMIN — APIXABAN 5 MG: 5 TABLET, FILM COATED ORAL at 14:10

## 2022-08-16 RX ADMIN — BUMETANIDE 1 MG: 0.25 INJECTION, SOLUTION INTRAMUSCULAR; INTRAVENOUS at 09:42

## 2022-08-16 RX ADMIN — CHLORHEXIDINE GLUCONATE 15 ML: 1.2 RINSE ORAL at 09:49

## 2022-08-16 RX ADMIN — MIDODRINE HYDROCHLORIDE 20 MG: 5 TABLET ORAL at 21:02

## 2022-08-16 RX ADMIN — POTASSIUM & SODIUM PHOSPHATES POWDER PACK 280-160-250 MG 1 PACKET: 280-160-250 PACK at 08:18

## 2022-08-16 RX ADMIN — APIXABAN 5 MG: 5 TABLET, FILM COATED ORAL at 04:18

## 2022-08-16 RX ADMIN — MEXILETINE HYDROCHLORIDE 150 MG: 150 CAPSULE ORAL at 04:18

## 2022-08-16 RX ADMIN — MINERAL SUPPLEMENT IRON 300 MG / 5 ML STRENGTH LIQUID 100 PER BOX UNFLAVORED 300 MG: at 12:01

## 2022-08-16 RX ADMIN — MEXILETINE HYDROCHLORIDE 150 MG: 150 CAPSULE ORAL at 16:40

## 2022-08-16 RX ADMIN — MIDODRINE HYDROCHLORIDE 20 MG: 5 TABLET ORAL at 14:10

## 2022-08-16 RX ADMIN — ALBUMIN (HUMAN) 25 G: 0.25 INJECTION, SOLUTION INTRAVENOUS at 12:01

## 2022-08-16 RX ADMIN — MIDODRINE HYDROCHLORIDE 20 MG: 5 TABLET ORAL at 06:24

## 2022-08-16 RX ADMIN — Medication 10 ML: at 16:40

## 2022-08-16 RX ADMIN — FLUDROCORTISONE ACETATE 0.1 MG: 0.1 TABLET ORAL at 08:18

## 2022-08-16 RX ADMIN — ALBUMIN (HUMAN) 25 G: 0.25 INJECTION, SOLUTION INTRAVENOUS at 01:50

## 2022-08-16 RX ADMIN — HYDROMORPHONE HYDROCHLORIDE 0.5 MG: 1 INJECTION, SOLUTION INTRAMUSCULAR; INTRAVENOUS; SUBCUTANEOUS at 14:10

## 2022-08-16 RX ADMIN — BUMETANIDE 1 MG: 0.25 INJECTION, SOLUTION INTRAMUSCULAR; INTRAVENOUS at 18:47

## 2022-08-16 RX ADMIN — CHOLESTYRAMINE 4 G: 4 POWDER, FOR SUSPENSION ORAL at 08:18

## 2022-08-16 RX ADMIN — HYDROCODONE BITARTRATE AND ACETAMINOPHEN 1 TABLET: 10; 325 TABLET ORAL at 21:09

## 2022-08-16 RX ADMIN — Medication 10 ML: at 06:24

## 2022-08-16 RX ADMIN — PIPERACILLIN AND TAZOBACTAM 3.38 G: 3; .375 INJECTION, POWDER, FOR SOLUTION INTRAVENOUS at 06:26

## 2022-08-16 RX ADMIN — HYDROCODONE BITARTRATE AND ACETAMINOPHEN 1 TABLET: 10; 325 TABLET ORAL at 01:50

## 2022-08-16 RX ADMIN — LEVOTHYROXINE SODIUM 50 MCG: 0.05 TABLET ORAL at 06:21

## 2022-08-16 RX ADMIN — CHOLESTYRAMINE 4 G: 4 POWDER, FOR SUSPENSION ORAL at 18:46

## 2022-08-16 RX ADMIN — COLLAGENASE SANTYL: 250 OINTMENT TOPICAL at 09:49

## 2022-08-16 RX ADMIN — MINERAL SUPPLEMENT IRON 300 MG / 5 ML STRENGTH LIQUID 100 PER BOX UNFLAVORED 300 MG: at 08:18

## 2022-08-16 RX ADMIN — CHOLESTYRAMINE 4 G: 4 POWDER, FOR SUSPENSION ORAL at 13:31

## 2022-08-16 RX ADMIN — ALBUMIN (HUMAN) 25 G: 0.25 INJECTION, SOLUTION INTRAVENOUS at 06:24

## 2022-08-16 RX ADMIN — ALBUMIN (HUMAN) 25 G: 0.25 INJECTION, SOLUTION INTRAVENOUS at 18:47

## 2022-08-16 RX ADMIN — CHLORHEXIDINE GLUCONATE 15 ML: 1.2 RINSE ORAL at 21:03

## 2022-08-16 RX ADMIN — PIPERACILLIN AND TAZOBACTAM 3.38 G: 3; .375 INJECTION, POWDER, FOR SOLUTION INTRAVENOUS at 18:47

## 2022-08-16 RX ADMIN — ATORVASTATIN CALCIUM 40 MG: 40 TABLET, FILM COATED ORAL at 21:03

## 2022-08-16 NOTE — PROGRESS NOTES
Name: Wes Leung MRN: [id number]   : 1980 [facility]: [facility]   Date: 2022        IMPRESSION:   YADY, started on iHD initially. patient became hypotensive and was switched to CRRT. --Now CRRT on hold-urine output increasing, had 1 lit last 24 hrs  Hypotension resolved, off iv pressor, on midodrine  Hypophosphatemia- being addressed  Debility with multiple pressure ulcers  Respiratory failure - on vent  Anemia of chronic disease-   Hyperkalemia - resolved with RRT  Protein deficiency  wounds      PLAN:   Off CRRT, hold HD again today with increase in UO, reevaluate in am  -IV albumin  IV bumex 1 mg bid  -Midodrine increased b primary team to 20 mg tid  Watch for GFR recovery --> hallmark will be increased urine output. Prior to starting dialysis patient's Scr was 0.8- UO increasing   Epo increased to 14K/week  Replete K/ phos-. NeutraPhos via PEG  Tube feeding  Poor overall prognosis. Dose meds for his GFR. Avoid NSAIDs + IV contrast.     Subjective/Interval History:   I have reviewed the flowsheet and previous days notes. Seen on CRRT, awake and alert, denies pain/SOB with nodding head      F/U - YADY , CRRT --> 2022    Remains on CRRT + pressors. No new complaints were offered.  seen on CRRT, d/w ICU RN   CRRT stopped, BP stable on minimal Levophed, getting NeutraPhos  8/15 awake and alert, on vent. BP stable, had 175 ml UO yesterday, 150 ml today sofar. Cr 1.1. will add bumex if BP toleerates, no HD today  , awake on vent. UO has increased had 1 lit yesterday with bumex, cr increasing.  BP stable, will hold HD today again          Objective:   Vital Signs:    Visit Vitals  /74 (BP 1 Location: Left arm, BP Patient Position: At rest)   Pulse 91   Temp (!) 96.7 °F (35.9 °C)   Resp 15   Ht 5' 8\" (1.727 m)   Wt 72.8 kg (160 lb 7.9 oz)   SpO2 100%   BMI 24.40 kg/m²       O2 Device: Tracheostomy, Ventilator   O2 Flow Rate (L/min): 6 l/min   Temp (24hrs), Av.6 °F (36.4 °C), Min:96.7 °F (35.9 °C), Max:98.3 °F (36.8 °C)       Intake/Output:   Last shift:      No intake/output data recorded. Last 3 shifts: 08/14 1901 - 08/16 0700  In: 2355.3 [P.O.:200; I.V.:425.3]  Out: 1001 [Urine:1000]    Intake/Output Summary (Last 24 hours) at 8/16/2022 0844  Last data filed at 8/16/2022 0600  Gross per 24 hour   Intake 1543.07 ml   Output 851 ml   Net 692.07 ml          Physical Exam:      Seen in CCU - Bed 25. CVVH was in progress during my visit. General:    Awake and alert on vent    Head:   Normocephalic,  bitemporal muscle wasting. Eyes:   Conjunctivae/corneas clear. Neck:  Trach    Lungs:   Decreased breathing sounds left base,  no wheezes, no rales. Heart:   No S 3 gallop, no pericardial rub  . Abdomen:   Not distended. Extremities: Muscle wasting --> 2 +                          Leg oedema -> mild     Skin:     Eschar - heel. Psych:      Neurologic: Responding to questions appropriately  . DATA:  Labs:  Recent Labs     08/16/22  0420 08/15/22  0416 08/14/22  0355    134* 138   K 3.6 3.6 3.5    105 106   CO2 26 26 27   BUN 34* 22* 16   CREA 1.57* 1.11 0.74   CA 7.8* 7.8* 7.5*   ALB 2.4* 1.6* 1.8*   PHOS 3.0 2.1* 1.7*   MG 2.5* 2.5* 2.4       Recent Labs     08/14/22  0355   WBC 8.3   HGB 8.0*   HCT 25.6*          No results for input(s): YAMIL, KU, CLU, CREAU in the last 72 hours.     No lab exists for component: PROU    Total time spent with patient:           Care Plan discussed with:    ICU RN    Kashif Mcdainel MD

## 2022-08-16 NOTE — PROGRESS NOTES
0730 Bedside and Verbal shift change report given to Jailene Parmar (oncoming nurse) by Tatiana Wyatt (offgoing nurse). Report included the following information SBAR, Kardex, Procedure Summary, Intake/Output, MAR, Accordion, and Recent Results.       0800 Monica @ bedside

## 2022-08-16 NOTE — PROGRESS NOTES
Problem: Self Care Deficits Care Plan (Adult)  Goal: *Acute Goals and Plan of Care (Insert Text)  Description: FUNCTIONAL STATUS PRIOR TO ADMISSION: pt unclear historian, sina and alejandro. Pt admitted from [facility]. Per chart, prior to start of , pt was living alone with care aides to assist with ADLs. Pt shook head yes/no to questions, reported he was able to transfer himself to power w/c via sliding board. Pt with hx of paraplegia? States he can feel bowel and bladder. HOME SUPPORT: lives alone, care aides daily to assist with ADLs at baseline. Pt admitted to [address] [place] [address] 2* trach and vent dependent     Occupational Therapy Goals  Initiated 2022  1. Patient will perform self-feeding with supervision/set-up within 7 day(s). 2.  Patient will perform upper body dressing with moderate assistance  within 7 day(s). 3.  Patient will perform rolling in bed in place for bed pan/toileting with mod A x 2 within 7 days. 4.  Patient will perform anterior bathing in supported sitting chest to thighs with minimal assist within 7 days. 5.  Patient will participate in upper extremity therapeutic exercise/activities with minimal assistance/contact guard assist for 10 minutes within 7 day(s). Outcome: Not Met    OCCUPATIONAL THERAPY TREATMENT  Patient: Rodrigo Sullivan (81 y.o. male)  Date: 2022  Diagnosis: YADY (acute kidney injury) ([facility] [address] [N17.9] <principal problem not specified>      Precautions:    Chart, occupational therapy assessment, plan of care, and goals were reviewed. ASSESSMENT  Patient continues with skilled OT services and is slowly progressing towards goals. RN donned PMV today with VSS during OT session. Pt oriented to name, , and type of place. Re-oriented pt to year, hospital name and city. Pt does not recall what level SCI he is, hx of spinal injury in . He reported he is usually able to feed himself at home, needs assist from care aides for dressing and bathing.  Pt stated he uses sliding board at home to get from bed to w/c. This date, pt participated in  UE ARROM exercises, improved strength/active movement observed in L UE. Pt able to move UEs against gravity and washed face with R hand with min A. Pt required encouragement to attempt to wash face as independently as possible. Will follow pt peripherally 1x/week for OT to address feeding and UE exercises. Pt will likely only need 1 more session. Current Level of Function Impacting Discharge (ADLs): min to total A ADLs, extensive wounds to sacrum, hips, heels    Other factors to consider for discharge: from [address] :  Patient continues to benefit from skilled intervention to address the above impairments. Continue treatment per established plan of care to address goals. Recommend with staff: skin protection, elevate    Recommend next OT session: self feeding, built up foam    Recommendation for discharge: (in order for the patient to meet his/her long term goals)  LTACH    This discharge recommendation:  Has been made in collaboration with the attending provider and/or case management    IF patient discharges home will need the following DME: tbd       SUBJECTIVE:   Patient stated I'm in [facility].     OBJECTIVE DATA SUMMARY:   Cognitive/Behavioral Status:  Neurologic State: Alert  Orientation Level: Unable to verbalize  Cognition: Follows commands             Functional Mobility and Transfers for ADLs:  Bed Mobility:   Max A x 2 using B hands on bed rails to pull trunk forward to try to sit [place] [facility]    Transfers:             Balance:       ADL Intervention:       Grooming  Washing Hands: Minimum assistance (R hand, A for thoroughness)         Type of Bath: Chlorhexidine (CHG)                             Therapeutic Exercises:   10 reps of AAROM elbow flexion/extension  10 reps digit flexion/extension  5 reps of L UE arm punches  10 reps AAROM shoulder flexion     Pain:  C/o itchiness at R IV central     Activity Tolerance:   Good on PMV    After treatment patient left in no apparent distress:   Supine in bed and Heels elevated for pressure relief    COMMUNICATION/COLLABORATION:   The patients plan of care was discussed with: Registered nurse.      Jun Raya OT  Time Calculation: 19 mins

## 2022-08-16 NOTE — PROGRESS NOTES
Pt continues to be vent dependent. Pt has periods of being anxious and pulls at trach and touches lines. Pt medicated with Hydrocodone and also explained several times to patient plan of care. Pt had a significant bowel movement that required additional wound care. Also cleaned suprapubic, peg, and trach sites. Pt also had complaints of neck itching.

## 2022-08-16 NOTE — PROGRESS NOTES
CRITICAL CARE NOTE      Name: Jabari Blanco   : 1980   MRN: [id number]   Date: 2022      REASON FOR ICU ADMISSION:  Acute renal failure, chronic vent dependence, septic shock     PRINCIPAL ICU DIAGNOSIS   Septic shock, unclear etiology  Acute renal injury  Chronic hypoxic respiratory failure, s/p trach, vent dependent  Chronic decubitus pressure injury ulcers, present on admission  HFrEF (20-25%)  Afib  Chronic hypotension  Paraplegia    BRIEF PATIENT SUMMARY   33-year-old male with known past medical history of multiple chronic issues as listed in problem list, who presented to [facility] ED after being sent from [facility] for possible need for CRRT for YADY due to inability to tolerate iHD due to sepsis/septic shock from unclear source. COMPREHENSIVE ASSESSMENT & PLAN:SYSTEM BASED     24 HOUR EVENTS:   No acute overnight events. Remains off vasopressors. NEUROLOGICAL:   -Awake and alert in no acute distress  -history of paraplegia  - PT/OT SLP consulted  - Dysphagia with GJ tube in place, however cleared for PO by SLP    PULMONOLOGY:   -Chronic respiratory failure with trach in place ( placed 2022)  -Trial as tolerated. -PRN nebs/suctioning  -ABCDEF protocol    CARDIOVASCULAR:     - Atrial fibrillation on eliquis  -Non-ischemic cardiomyopathy with EF 15-20%  -Recent PEA arrest (multiple)  -Pulmonary hypertension with right ventricular dysfunction  -Continue midodrine, florinef, mexiletine  - holding entresto due to hypotension    GASTROINTESTINAL     -Continue tube feeds as ordered    RENAL/ELECTROLYTE/FLUIDS:     -Responding to Bumex challenge with acceptable urine output. No indications for dialysis yet. Nephrology follow-up appreciated. -Trend lytes and replete as needed  -Florinef, midodrine, EPO  -Suprapubic catheter care    ENDOCRINE:   -Continue synthroid    HEMATOLOGY/ONCOLOGY:   - No signs of acute bleeding  - on eliquis    INFECTIOUS DISEASE:   Continue Vanc/Zosyn (D8/10).      Wound cultures polymicrobial with light MRSA and MDR organisms. Respiratory cultures growing light Proteus mirabilis and achromobacter. Considering overall improving status including hemodynamics and leukocytosis, we will hold off on broadening antibiotics. Unclear source, likely HAP. General surgery follow-up appreciated for decubitus ulcers. ICU DAILY CHECKLIST     Code Status:Partial (No Shocks, No Compressions) Meds okay  DVT Prophylaxis:Eliquis  T/L/D: Ye, GJ tube, Colostomy, suprapubic catheter, PIV  SUP: None  Diet: Tube Feed,PO as tolerated  Activity Level:Paraplegia  ABCDEF Bundle/Checklist Completed:Yes  Disposition: Stay in ICU. Initiate discharge planning. Multidisciplinary Rounds Completed: Yes  Patient/Family Updated: Yes       HOSPITAL COURSE/DAILY EVENT LOG   8/ pan Cx started on Vanc/Zosyn  8/9 started n CRRT levophed    SUBJECTIVE   Review of Systems   Constitutional:  Negative for chills, diaphoresis and fever. HENT:  Negative for congestion, ear pain, hearing loss, nosebleeds, sinus pain and sore throat. Eyes:  Negative for blurred vision, double vision and pain. Respiratory:  Negative for cough, sputum production, shortness of breath and wheezing. Cardiovascular:  Negative for chest pain, palpitations and orthopnea. Gastrointestinal:  Negative for abdominal pain, constipation, heartburn, nausea and vomiting. Genitourinary:  Negative for hematuria. Musculoskeletal:  Negative for falls, joint pain and myalgias. Skin:  Negative for itching and rash. Neurological:  Negative for dizziness, tremors, speech change, focal weakness, weakness and headaches. Endo/Heme/Allergies:  Does not bruise/bleed easily. Psychiatric/Behavioral:  Negative for depression. OBJECTIVE     Labs and Data: Reviewed 08/16/22  Medications: Reviewed 08/16/22  Imaging: Reviewed 08/16/22    Physical Exam  Vitals and nursing note reviewed. Constitutional:       General: He is awake.  He is not in acute distress. Appearance: He is underweight. HENT:      Head: Normocephalic and atraumatic. Nose: Nose normal.      Mouth/Throat:      Comments: trach  Eyes:      General: Lids are normal.      Conjunctiva/sclera: Conjunctivae normal.      Pupils: Pupils are equal, round, and reactive to light. Neck:     Cardiovascular:      Rate and Rhythm: Normal rate. Rhythm irregularly irregular. Heart sounds: Normal heart sounds, S1 normal and S2 normal. No murmur heard. No friction rub. No gallop. Pulmonary:      Effort: Pulmonary effort is normal. No tachypnea. Breath sounds: Normal breath sounds and air entry. Comments: On vent  Abdominal:      General: The ostomy site is clean. Bowel sounds are normal.       Musculoskeletal:      Right lower leg: No edema. Left lower leg: No edema. Comments: Chronic numerous pressure ulcer wounds throughout, present on admission see wound care note for further details   Skin:     General: Skin is warm and dry. Capillary Refill: Capillary refill takes 2 to 3 seconds. Coloration: Skin is pale. Findings: Wound present. Neurological:      Mental Status: He is alert, oriented to person, place, and time and easily aroused. Mental status is at baseline. GCS: GCS eye subscore is 4. GCS verbal subscore is 5. GCS motor subscore is 6. Psychiatric:         Behavior: Behavior is cooperative.         Visit Vitals  /69   Pulse 94   Temp (!) 96.7 °F (35.9 °C)   Resp 16   Ht 5' 8\" (1.727 m)   Wt 72.8 kg (160 lb 7.9 oz)   SpO2 100%   BMI 24.40 kg/m²    O2 Flow Rate (L/min): 6 l/min O2 Device: Tracheostomy, Ventilator Temp (24hrs), Av.6 °F (36.4 °C), Min:96.7 °F (35.9 °C), Max:98.3 °F (36.8 °C)           Intake/Output:     Intake/Output Summary (Last 24 hours) at 2022 1145  Last data filed at 2022 0950  Gross per 24 hour   Intake 1550 ml   Output 951 ml   Net 599 ml         Imaging       Pertinent imaging reviewed and interpreted independently as noted above    CRITICAL CARE DOCUMENTATION  I had a face to face encounter with the patient, reviewed and interpreted patient data including clinical events, labs, images, vital signs, I/O's, and examined patient. I have discussed the case and the plan and management of the patient's care with the consulting services, the bedside nurses and the respiratory therapist.      NOTE OF PERSONAL INVOLVEMENT IN CARE   This patient has a high probability of imminent, clinically significant deterioration, which requires the highest level of preparedness to intervene urgently. I participated in the decision-making and personally managed or directed the management of the following life and organ supporting interventions that required my frequent assessment to treat or prevent imminent deterioration. I personally spent 30 minutes of critical care time. This is time spent at this critically ill patient's bedside actively involved in patient care as well as the coordination of care. This does not include any procedural time which has been billed separately.     Janey Ponce MD  Staff [address] [facility]

## 2022-08-16 NOTE — PROGRESS NOTES
Spiritual Care Assessment/Progress Note  [facility]      NAME: Leti Freedman      MRN: [id number]  AGE: 38 y.o. SEX: male  Religion Affiliation: No Synagogue   Language: English     8/16/2022     Total Time (in minutes): 5     Spiritual Assessment begun in [facility] CORONARY CARE through conversation with:         []Patient        [] Family    [] Friend(s)        Reason for Consult: Initial/Spiritual assessment, critical care     Spiritual beliefs: (Please include comment if needed)     [] Identifies with a jevon tradition:         [] Supported by a jevon community:            [] Claims no spiritual orientation:           [] Seeking spiritual identity:                [] Adheres to an individual form of spirituality:           [x] Not able to assess:                           Identified resources for coping:      [] Prayer                               [] Music                  [] Guided Imagery     [] Family/friends                 [] Pet visits     [] Devotional reading                         [] Unknown     [] Other:                                               Interventions offered during this visit: (See comments for more details)                Plan of Care:     [] Support spiritual and/or cultural needs    [] Support AMD and/or advance care planning process      [] Support grieving process   [] Coordinate Rites and/or Rituals    [] Coordination with community clergy   [] No spiritual needs identified at this time   [] Detailed Plan of Care below (See Comments)  [] Make referral to Music Therapy  [] Make referral to Pet Therapy     [] Make referral to Addiction services  [] Make referral to [facility]  [] Make referral to Spiritual Care Partner  [] No future visits requested        [] Contact Spiritual Care for further referrals     Comments: Attempted to visit Mr Pinky Dietrich in [address] [place] [address] for initial spiritual assessment. Staff was providing care; unable to do assessment at that time. : Rev. Juma Troncoso.  Geovani Cool; [facility], to contact [id number] [address] call: [phone]

## 2022-08-16 NOTE — PROGRESS NOTES
SLP Contact Note    Discussed with RN Juvencio mcpherson, appreciate his assistance. Pt tolerating in-line PMV and diet without difficulty. Dr. Teofilo Chen and [place] discussing about changing vent setting to work towards trach collar. Am in agreement with this plan. Will continue to follow.       Thank you,  GHASSAN Coles.Ed, [id number] [facility]  Speech-Language Pathologist

## 2022-08-16 NOTE — PROGRESS NOTES
JADEN: Anticipate discharge back to LTAC (referrals pending) pending medical progress. Transportation likely ALS with [facility]. RUR: 15%     Emergency contact: Jp Walden, libbyer, [phone] or [phone]     Disposition: Patient admitted from [address] [place] [address] 8/9 for YADY. Patient has a trach and is on a vent. Hx: paraplegia, skin ulcers, afib, chronic sepsis, cardiac arrest, CVA, chronic loya, tracheostomy ventilator, gtube, pulmonary embolism, systolic CHF, HTN, HLD, diabetes type II. [facility] in [place] is willing to accept patient. At this time, nephrology is still determining whether dialysis is needed. Cancelled dialysis for second day in a row. Wound vac to be placed on Thursday. CM will continue to follow for discharge needs.     Lamin Qiu, [address] [place] [address],[address]  [phone]

## 2022-08-17 ENCOUNTER — APPOINTMENT (OUTPATIENT)
Dept: GENERAL RADIOLOGY | Age: 42
DRG: 853 | End: 2022-08-17
Attending: INTERNAL MEDICINE
Payer: MEDICARE

## 2022-08-17 LAB
ALBUMIN SERPL-MCNC: 2.9 G/DL (ref 3.5–5)
ANION GAP SERPL CALC-SCNC: 7 MMOL/L (ref 5–15)
BUN SERPL-MCNC: 41 MG/DL (ref 6–20)
BUN/CREAT SERPL: 23 (ref 12–20)
CALCIUM SERPL-MCNC: 7.9 MG/DL (ref 8.5–10.1)
CHLORIDE SERPL-SCNC: 103 MMOL/L (ref 97–108)
CO2 SERPL-SCNC: 26 MMOL/L (ref 21–32)
CREAT SERPL-MCNC: 1.81 MG/DL (ref 0.7–1.3)
GLUCOSE SERPL-MCNC: 124 MG/DL (ref 65–100)
MAGNESIUM SERPL-MCNC: 2.6 MG/DL (ref 1.6–2.4)
PHOSPHATE SERPL-MCNC: 3.5 MG/DL (ref 2.6–4.7)
POTASSIUM SERPL-SCNC: 3.5 MMOL/L (ref 3.5–5.1)
PROCALCITONIN SERPL-MCNC: 0.95 NG/ML
SODIUM SERPL-SCNC: 136 MMOL/L (ref 136–145)
VANCOMYCIN SERPL-MCNC: 14.1 UG/ML

## 2022-08-17 PROCEDURE — 90935 HEMODIALYSIS ONE EVALUATION: CPT

## 2022-08-17 PROCEDURE — 74011636637 HC RX REV CODE- 636/637: Performed by: NURSE PRACTITIONER

## 2022-08-17 PROCEDURE — 71045 X-RAY EXAM CHEST 1 VIEW: CPT

## 2022-08-17 PROCEDURE — 74011250637 HC RX REV CODE- 250/637: Performed by: STUDENT IN AN ORGANIZED HEALTH CARE EDUCATION/TRAINING PROGRAM

## 2022-08-17 PROCEDURE — 74011250636 HC RX REV CODE- 250/636: Performed by: NURSE PRACTITIONER

## 2022-08-17 PROCEDURE — 36415 COLL VENOUS BLD VENIPUNCTURE: CPT

## 2022-08-17 PROCEDURE — 74011250636 HC RX REV CODE- 250/636: Performed by: STUDENT IN AN ORGANIZED HEALTH CARE EDUCATION/TRAINING PROGRAM

## 2022-08-17 PROCEDURE — 83735 ASSAY OF MAGNESIUM: CPT

## 2022-08-17 PROCEDURE — P9047 ALBUMIN (HUMAN), 25%, 50ML: HCPCS | Performed by: INTERNAL MEDICINE

## 2022-08-17 PROCEDURE — 74011000258 HC RX REV CODE- 258: Performed by: INTERNAL MEDICINE

## 2022-08-17 PROCEDURE — 74011000250 HC RX REV CODE- 250: Performed by: NURSE PRACTITIONER

## 2022-08-17 PROCEDURE — 94003 VENT MGMT INPAT SUBQ DAY: CPT

## 2022-08-17 PROCEDURE — 74011250637 HC RX REV CODE- 250/637: Performed by: INTERNAL MEDICINE

## 2022-08-17 PROCEDURE — 65620000000 HC RM CCU GENERAL

## 2022-08-17 PROCEDURE — 80202 ASSAY OF VANCOMYCIN: CPT

## 2022-08-17 PROCEDURE — 74011250636 HC RX REV CODE- 250/636: Performed by: INTERNAL MEDICINE

## 2022-08-17 PROCEDURE — 74011000250 HC RX REV CODE- 250: Performed by: INTERNAL MEDICINE

## 2022-08-17 PROCEDURE — 84145 PROCALCITONIN (PCT): CPT

## 2022-08-17 PROCEDURE — 74011250637 HC RX REV CODE- 250/637: Performed by: NURSE PRACTITIONER

## 2022-08-17 PROCEDURE — 80069 RENAL FUNCTION PANEL: CPT

## 2022-08-17 RX ORDER — MIDODRINE HYDROCHLORIDE 5 MG/1
10 TABLET ORAL DAILY PRN
Status: DISCONTINUED | OUTPATIENT
Start: 2022-08-17 | End: 2022-08-27

## 2022-08-17 RX ORDER — MIDODRINE HYDROCHLORIDE 5 MG/1
10 TABLET ORAL
Status: COMPLETED | OUTPATIENT
Start: 2022-08-17 | End: 2022-08-17

## 2022-08-17 RX ORDER — BUMETANIDE 0.25 MG/ML
1 INJECTION INTRAMUSCULAR; INTRAVENOUS DAILY
Status: DISCONTINUED | OUTPATIENT
Start: 2022-08-18 | End: 2022-08-18

## 2022-08-17 RX ORDER — ALBUMIN HUMAN 250 G/1000ML
12.5 SOLUTION INTRAVENOUS DAILY PRN
Status: DISCONTINUED | OUTPATIENT
Start: 2022-08-17 | End: 2022-08-27

## 2022-08-17 RX ADMIN — LEVOTHYROXINE SODIUM 50 MCG: 0.05 TABLET ORAL at 05:29

## 2022-08-17 RX ADMIN — PIPERACILLIN AND TAZOBACTAM 3.38 G: 3; .375 INJECTION, POWDER, FOR SOLUTION INTRAVENOUS at 19:11

## 2022-08-17 RX ADMIN — ONDANSETRON HYDROCHLORIDE 4 MG: 2 INJECTION, SOLUTION INTRAMUSCULAR; INTRAVENOUS at 13:47

## 2022-08-17 RX ADMIN — APIXABAN 5 MG: 5 TABLET, FILM COATED ORAL at 15:15

## 2022-08-17 RX ADMIN — Medication 10 ML: at 21:18

## 2022-08-17 RX ADMIN — HEPARIN SODIUM 1400 UNITS: 1000 INJECTION INTRAVENOUS; SUBCUTANEOUS at 14:29

## 2022-08-17 RX ADMIN — CHOLESTYRAMINE 4 G: 4 POWDER, FOR SUSPENSION ORAL at 11:12

## 2022-08-17 RX ADMIN — MIDODRINE HYDROCHLORIDE 10 MG: 5 TABLET ORAL at 10:39

## 2022-08-17 RX ADMIN — CHLORHEXIDINE GLUCONATE 15 ML: 1.2 RINSE ORAL at 08:38

## 2022-08-17 RX ADMIN — VANCOMYCIN HYDROCHLORIDE 750 MG: 750 INJECTION, POWDER, LYOPHILIZED, FOR SOLUTION INTRAVENOUS at 16:04

## 2022-08-17 RX ADMIN — MIDODRINE HYDROCHLORIDE 20 MG: 5 TABLET ORAL at 05:29

## 2022-08-17 RX ADMIN — ALBUMIN (HUMAN) 25 G: 0.25 INJECTION, SOLUTION INTRAVENOUS at 05:29

## 2022-08-17 RX ADMIN — MEXILETINE HYDROCHLORIDE 150 MG: 150 CAPSULE ORAL at 16:03

## 2022-08-17 RX ADMIN — ALBUMIN (HUMAN) 25 G: 0.25 INJECTION, SOLUTION INTRAVENOUS at 11:11

## 2022-08-17 RX ADMIN — ALBUMIN (HUMAN) 25 G: 0.25 INJECTION, SOLUTION INTRAVENOUS at 00:07

## 2022-08-17 RX ADMIN — MIDODRINE HYDROCHLORIDE 20 MG: 5 TABLET ORAL at 13:36

## 2022-08-17 RX ADMIN — HEPARIN SODIUM 1400 UNITS: 1000 INJECTION INTRAVENOUS; SUBCUTANEOUS at 14:28

## 2022-08-17 RX ADMIN — MINERAL SUPPLEMENT IRON 300 MG / 5 ML STRENGTH LIQUID 100 PER BOX UNFLAVORED 300 MG: at 08:38

## 2022-08-17 RX ADMIN — MEXILETINE HYDROCHLORIDE 150 MG: 150 CAPSULE ORAL at 03:30

## 2022-08-17 RX ADMIN — COLLAGENASE SANTYL: 250 OINTMENT TOPICAL at 08:39

## 2022-08-17 RX ADMIN — APIXABAN 5 MG: 5 TABLET, FILM COATED ORAL at 03:30

## 2022-08-17 RX ADMIN — CHOLESTYRAMINE 4 G: 4 POWDER, FOR SUSPENSION ORAL at 16:03

## 2022-08-17 RX ADMIN — PIPERACILLIN AND TAZOBACTAM 3.38 G: 3; .375 INJECTION, POWDER, FOR SOLUTION INTRAVENOUS at 06:07

## 2022-08-17 RX ADMIN — Medication 10 ML: at 13:47

## 2022-08-17 RX ADMIN — FLUDROCORTISONE ACETATE 0.1 MG: 0.1 TABLET ORAL at 08:38

## 2022-08-17 RX ADMIN — HYDROMORPHONE HYDROCHLORIDE 0.5 MG: 1 INJECTION, SOLUTION INTRAMUSCULAR; INTRAVENOUS; SUBCUTANEOUS at 23:39

## 2022-08-17 RX ADMIN — Medication 5 UNITS: at 08:38

## 2022-08-17 RX ADMIN — HYDROCODONE BITARTRATE AND ACETAMINOPHEN 1 TABLET: 10; 325 TABLET ORAL at 13:36

## 2022-08-17 RX ADMIN — HYDROCODONE BITARTRATE AND ACETAMINOPHEN 1 TABLET: 10; 325 TABLET ORAL at 21:17

## 2022-08-17 RX ADMIN — HYDROMORPHONE HYDROCHLORIDE 0.5 MG: 1 INJECTION, SOLUTION INTRAMUSCULAR; INTRAVENOUS; SUBCUTANEOUS at 01:30

## 2022-08-17 RX ADMIN — BUMETANIDE 1 MG: 0.25 INJECTION, SOLUTION INTRAMUSCULAR; INTRAVENOUS at 08:38

## 2022-08-17 RX ADMIN — ATORVASTATIN CALCIUM 40 MG: 40 TABLET, FILM COATED ORAL at 21:17

## 2022-08-17 RX ADMIN — CHOLESTYRAMINE 4 G: 4 POWDER, FOR SUSPENSION ORAL at 08:38

## 2022-08-17 RX ADMIN — MINERAL SUPPLEMENT IRON 300 MG / 5 ML STRENGTH LIQUID 100 PER BOX UNFLAVORED 300 MG: at 11:12

## 2022-08-17 RX ADMIN — MINERAL SUPPLEMENT IRON 300 MG / 5 ML STRENGTH LIQUID 100 PER BOX UNFLAVORED 300 MG: at 16:04

## 2022-08-17 RX ADMIN — CHLORHEXIDINE GLUCONATE 15 ML: 1.2 RINSE ORAL at 21:18

## 2022-08-17 RX ADMIN — MIDODRINE HYDROCHLORIDE 20 MG: 5 TABLET ORAL at 21:17

## 2022-08-17 NOTE — PROCEDURES
[facility] Dialysis Team [facility]  [phone]    Vitals   Pre   Post   Assessment   Pre   Post     Temp  Temp: 97.7 °F (36.5 °C) (08/17/22 1120)  98.2 LOC  Lethargic, did open eyes to voice, did nod in to questioning appropriately Alert appropriate complaining of being hungry   HR   Pulse (Heart Rate): 89 (08/17/22 1120) 92 Lungs   Trach collar on ventilator   Trach collar on ventilator   B/P   BP: 124/82 (08/17/22 1120) 129/88 Cardiac   Bedside telemetry regular   Bedside telemetry rate 90s regular    Resp   Resp Rate: 16 (08/17/22 1120) 17 Skin   Warm and dry multiple wounds   Warm and dry    Pain level  Pain Intensity 1: 0 (08/17/22 0800) Only verbal complaint is of hunger and he is ready for lunch Edema    No edema appreciated    No edema appreciated   Orders:    Duration:   Start:    1120 End:    1450 Total:   3.5   Dialyzer:   Dialyzer/Set Up Inspection: Revaclear (08/17/22 1120)   K Bath:   Dialysate K (mEq/L): 4 (08/17/22 1120)   Ca Bath:   Dialysate CA (mEq/L): 2.5 (08/17/22 1120)   Na/Bicarb:   Dialysate NA (mEq/L): 138 (08/17/22 1120)   Target Fluid Removal:   Goal/Amount of Fluid to Remove (mL): 1000 mL (08/17/22 1120)   Access     Type & Location:   RIJ non tunneled CVC, dressing clean, dry and intact dated 08/16/22, Each catheter limb disinfected per p&p, caps removed, hubs disinfected per p&p, aspirated 5 ml each limb, flushed easily.    Labs     Obtained/Reviewed   Critical Results Called   Date when labs were drawn-  Hgb-    HGB   Date Value Ref Range Status   08/14/2022 8.0 (L) 12.1 - 17.0 g/dL Final     K-    Potassium   Date Value Ref Range Status   08/17/2022 3.5 3.5 - 5.1 mmol/L Final     Ca-   Calcium   Date Value Ref Range Status   08/17/2022 7.9 (L) 8.5 - 10.1 MG/DL Final     Bun-   BUN   Date Value Ref Range Status   08/17/2022 41 (H) 6 - 20 MG/DL Final     Creat-   Creatinine   Date Value Ref Range Status   08/17/2022 1.81 (H) 0.70 - 1.30 MG/DL Final        Medications/ Blood Products Given     Name   Dose   Route and Time     heparin 1:1000 1.4 red port and 1.4 blue port              Blood Volume Processed (BVP):    60 Net Fluid   Removed:  1000   Comments   Time Out Done: 0067  Primary Nurse Rpt Pre: Power Caceres RN   Primary Nurse Rpt Post: Power Caceres RN   Pt Education: infection control and procedural   Care Plan: continue current HD plan of care   Tx Summary: orders, code status, labs and timeout reviewed before initiation  1120 HD initiated as ordered. 1450 treatment completed all possible blood returned, Each dialysis catheter limb disinfected per p&p, blood returned per p&p, each dialysis hub disinfected per p&p, post dialysis catheter dwell instilled per order, and caps applied. diaphoresis  All dialysis related medications have been reviewed.     Admiting Diagnosis: hypotension  Pt's previous clinic- n/a patient is currently residing at [facility]   Consent signed - Informed Consent Verified:  (continuation of care from crrt to HD MD signed consent) (08/17/22 1120)  Hepatitis Status- 08/09/22 negative antigen susceptible  Machine #- Machine Number: [id number] (08/17/22 1120)  Telemetry status bedside telemetry

## 2022-08-17 NOTE — PROGRESS NOTES
CRITICAL CARE NOTE      Name: Lucas Paige   : 1980   MRN: [id number]   Date: 2022      REASON FOR ICU ADMISSION:  Acute renal failure, chronic vent dependence, septic shock     PRINCIPAL ICU DIAGNOSIS   Septic shock, unclear etiology  Acute renal injury  Chronic hypoxic respiratory failure, s/p trach, vent dependent  Chronic decubitus pressure injury ulcers, present on admission  HFrEF (20-25%)  Afib  Chronic hypotension  Paraplegia    BRIEF PATIENT SUMMARY   59-year-old male with known past medical history of multiple chronic issues as listed in problem list, who presented to [facility] ED after being sent from [address] [place] [address] for possible need for CRRT for YADY due to inability to tolerate iHD due to sepsis/septic shock from unclear source. COMPREHENSIVE ASSESSMENT & PLAN:SYSTEM BASED     24 HOUR EVENTS:   No acute overnight events. Remains off vasopressors. NEUROLOGICAL:   -Awake and alert in no acute distress  -history of paraplegia  - PT/OT SLP consulted  - Dysphagia with GJ tube in place, however cleared for PO by SLP    PULMONOLOGY:   -Chronic respiratory failure with trach in place ( placed 2022)  - SBT daily. Reports he was on TC completely in February. -PRN nebs/suctioning  -ABCDEF protocol    CARDIOVASCULAR:     - Atrial fibrillation on eliquis  -Non-ischemic cardiomyopathy with EF 15-20%  -Recent PEA arrest (multiple)  -Pulmonary hypertension with right ventricular dysfunction  -Continue midodrine, florinef, mexiletine. PRN midodrine 10mg pre-HD  - holding entresto due to hypotension    GASTROINTESTINAL     -Continue tube feeds as ordered    RENAL/ELECTROLYTE/FLUIDS:     -Responding to Bumex challenge with acceptable urine output. No indications for dialysis yet. Nephrology follow-up appreciated.   -Trend lytes and replete as needed  -Florinef, midodrine, EPO  -Suprapubic catheter care    ENDOCRINE:   -Continue synthroid    HEMATOLOGY/ONCOLOGY:   - No signs of acute bleeding  - on eliquis    INFECTIOUS DISEASE:   Continue Vanc/Zosyn (D9/10). Wound cultures polymicrobial with light MRSA and MDR organisms. Respiratory cultures growing light Proteus mirabilis and achromobacter. Considering overall improving status including hemodynamics and leukocytosis, we will hold off on broadening antibiotics. Unclear source, likely HAP. General surgery follow-up appreciated for decubitus ulcers. Plans for WoundVac placement. ICU DAILY CHECKLIST     Code Status:Partial (No Shocks, No Compressions) Meds okay  DVT Prophylaxis:Eliquis  T/L/D: Ye, GJ tube, Colostomy, suprapubic catheter, PIV  SUP: None  Diet: Tube Feed,PO as tolerated  Activity Level:Paraplegia  ABCDEF Bundle/Checklist Completed:Yes  Disposition: Stay in ICU. Initiate discharge planning. Multidisciplinary Rounds Completed: Yes  Patient/Family Updated: Yes       HOSPITAL COURSE/DAILY EVENT LOG   8/ pan Cx started on Vanc/Zosyn  8/9 started n CRRT levophed    SUBJECTIVE   Review of Systems   Constitutional:  Negative for chills, diaphoresis and fever. HENT:  Negative for congestion, ear pain, hearing loss, nosebleeds, sinus pain and sore throat. Eyes:  Negative for blurred vision, double vision and pain. Respiratory:  Negative for cough, sputum production, shortness of breath and wheezing. Cardiovascular:  Negative for chest pain, palpitations and orthopnea. Gastrointestinal:  Negative for abdominal pain, constipation, heartburn, nausea and vomiting. Genitourinary:  Negative for hematuria. Musculoskeletal:  Negative for falls, joint pain and myalgias. Skin:  Negative for itching and rash. Neurological:  Negative for dizziness, tremors, speech change, focal weakness, weakness and headaches. Endo/Heme/Allergies:  Does not bruise/bleed easily. Psychiatric/Behavioral:  Negative for depression.        OBJECTIVE     Labs and Data: Reviewed 08/17/22  Medications: Reviewed 08/17/22  Imaging: Reviewed 22    Physical Exam  Vitals and nursing note reviewed. Constitutional:       General: He is awake. He is not in acute distress. Appearance: He is underweight. HENT:      Head: Normocephalic and atraumatic. Nose: Nose normal.      Mouth/Throat:      Comments: trach  Eyes:      General: Lids are normal.      Conjunctiva/sclera: Conjunctivae normal.      Pupils: Pupils are equal, round, and reactive to light. Neck:     Cardiovascular:      Rate and Rhythm: Normal rate. Rhythm irregularly irregular. Heart sounds: Normal heart sounds, S1 normal and S2 normal. No murmur heard. No friction rub. No gallop. Pulmonary:      Effort: Pulmonary effort is normal. No tachypnea. Breath sounds: Normal breath sounds and air entry. Comments: On vent  Abdominal:      General: The ostomy site is clean. Bowel sounds are normal.       Musculoskeletal:      Right lower leg: No edema. Left lower leg: No edema. Comments: Chronic numerous pressure ulcer wounds throughout, present on admission see wound care note for further details   Skin:     General: Skin is warm and dry. Capillary Refill: Capillary refill takes 2 to 3 seconds. Coloration: Skin is pale. Findings: Wound present. Neurological:      Mental Status: He is alert, oriented to person, place, and time and easily aroused. Mental status is at baseline. GCS: GCS eye subscore is 4. GCS verbal subscore is 5. GCS motor subscore is 6. Psychiatric:         Behavior: Behavior is cooperative.         Visit Vitals  BP (!) 131/92   Pulse 92   Temp 97.5 °F (36.4 °C)   Resp 17   Ht 5' 8\" (1.727 m)   Wt 89.2 kg (196 lb 10.4 oz)   SpO2 100%   BMI 29.90 kg/m²    O2 Flow Rate (L/min): 6 l/min O2 Device: Tracheostomy, Ventilator Temp (24hrs), Av.1 °F (36.7 °C), Min:97.5 °F (36.4 °C), Max:98.6 °F (37 °C)           Intake/Output:     Intake/Output Summary (Last 24 hours) at 2022 4827  Last data filed at 2022 1200  Gross per 24 hour   Intake 2410 ml   Output 1360 ml   Net 1050 ml         Imaging       Pertinent imaging reviewed and interpreted independently as noted above    CRITICAL CARE DOCUMENTATION  I had a face to face encounter with the patient, reviewed and interpreted patient data including clinical events, labs, images, vital signs, I/O's, and examined patient. I have discussed the case and the plan and management of the patient's care with the consulting services, the bedside nurses and the respiratory therapist.      NOTE OF PERSONAL INVOLVEMENT IN CARE   This patient has a high probability of imminent, clinically significant deterioration, which requires the highest level of preparedness to intervene urgently. I participated in the decision-making and personally managed or directed the management of the following life and organ supporting interventions that required my frequent assessment to treat or prevent imminent deterioration. I personally spent 30 minutes of critical care time. This is time spent at this critically ill patient's bedside actively involved in patient care as well as the coordination of care. This does not include any procedural time which has been billed separately.     Arjun Vega DO  Staff Intensivist  [facility]  8/17/2022

## 2022-08-17 NOTE — PROGRESS NOTES
Name: Rubin Villagomez MRN: [id number]   : 1980 Hospital: [facility] [address] 53   Date: 2022        IMPRESSION:   YADY, started on iHD initially. patient became hypotensive and was switched to CRRT. --Now CRRT on hold-urine output increasing, Bp stable  Hypotension resolved, off iv pressor, on midodrine  Hypophosphatemia- being addressed  Debility with multiple pressure ulcers  Respiratory failure - on vent  Anemia of chronic disease-   Hyperkalemia - resolved with RRT  Protein deficiency  wounds      PLAN:   Off CRRT, HD today with 1 kg UF  -IV albumin  IV bumex 1 mg daily  -Midodrine increased b primary team to 20 mg tid  Watch for GFR recovery --> hallmark will be increased urine output. Prior to starting dialysis patient's Scr was 0.8- UO increasing   Epo increased to 14K/week  Tube feeding nepro  Poor overall prognosis. Dose meds for his GFR. Avoid NSAIDs + IV contrast.     Subjective/Interval History:   I have reviewed the flowsheet and previous days notes. Seen on CRRT, awake and alert, denies pain/SOB with nodding head      F/U - YADY , CRRT --> 2022    Remains on CRRT + pressors. No new complaints were offered.  seen on CRRT, d/w ICU RN   CRRT stopped, BP stable on minimal Levophed, getting NeutraPhos  8/15 awake and alert, on vent. BP stable, had 175 ml UO yesterday, 150 ml today sofar. Cr 1.1. will add bumex if BP toleerates, no HD today  , awake on vent. UO has increased had 1 lit yesterday with bumex, cr increasing. BP stable, will hold HD today again   sleeping on vent, arousable, BP stable in 120s, had 1100 ml u/o and 500 overnight.  Cr higher          Objective:   Vital Signs:    Visit Vitals  /86 (BP 1 Location: Left arm, BP Patient Position: At rest)   Pulse 89   Temp 98.4 °F (36.9 °C)   Resp 15   Ht 5' 8\" (1.727 m)   Wt 89.2 kg (196 lb 10.4 oz)   SpO2 100%   BMI 29.90 kg/m²       O2 Device: Tracheostomy, Ventilator   O2 Flow Rate (L/min): 6 l/min   Temp (24hrs), Av.4 °F (36.9 °C), Min:98.2 °F (36.8 °C), Max:98.6 °F (37 °C)       Intake/Output:   Last shift:      701 - 1900  In: 560 [I.V.:370]  Out: 0   Last 3 shifts: 08/15 1901 -  0700  In: 2990 [P.O.:150; I.V.:680]  Out: 1801 [Urine:1800]    Intake/Output Summary (Last 24 hours) at 2022 0847  Last data filed at 2022 0800  Gross per 24 hour   Intake 2650 ml   Output 1150 ml   Net 1500 ml          Physical Exam:      Seen in CCU - Bed 25. CVVH was in progress during my visit. General:    Sleeping arousable on vent    Head:   Normocephalic,  bitemporal muscle wasting. Eyes:   Conjunctivae/corneas clear. Neck:  Trach    Lungs:   Decreased breathing sounds left base,  no wheezes, no rales. Heart:   Reg, No S 3 gallop, no pericardial rub  . Abdomen:   Not distended. PEG, suprapubic urinary cath    Extremities: Muscle wasting --> 2 + upper thigh                          Leg oedema -> mild     Skin:     Eschar - heel. Psych:      Neurologic: Responding to questions appropriately  . DATA:  Labs:  Recent Labs     22  0350 22  0420 08/15/22  0416    137 134*   K 3.5 3.6 3.6    104 105   CO2 26 26 26   BUN 41* 34* 22*   CREA 1.81* 1.57* 1.11   CA 7.9* 7.8* 7.8*   ALB 2.9* 2.4* 1.6*   PHOS 3.5 3.0 2.1*   MG 2.6* 2.5* 2.5*       No results for input(s): WBC, HGB, HCT, PLT, HGBEXT, HCTEXT, PLTEXT, HGBEXT, HCTEXT, PLTEXT in the last 72 hours. No results for input(s): YAMIL, KU, CLU, CREAU in the last 72 hours.     No lab exists for component: PROU    Total time spent with patient:           Care Plan discussed with:    ICU RN    Kassandra Parikh MD

## 2022-08-17 NOTE — PROGRESS NOTES
0730: Bedside shift change report given to Kelly Khoury, RN (oncoming nurse) by Neisha Pavon RN (offgoing nurse). Report included the following information SBAR, Kardex, ED Summary, OR Summary, Procedure Summary, Intake/Output, MAR, Recent Results, Cardiac Rhythm NSR/ST, and Dual Neuro Assessment. 0845: Dr Dawson Ramírez, Nephrology, assessing pt @ the bedside. MD placed orders to run pt on HD today and reduced IVP Bumex from BID to every day. 1000: Multidisciplinary rounds were held @ the bedside c Dr Venu Cabrera, Intensivist, CCL, charge nurse, CM and RD. Today's plan/goal includes (but not limited to): manage pain, VSS, attempt to run pt on HD, administer additional 10mg Midodrine prior to HD, trial pt on PS 5/5 on ventilator and assess readiness to d/c to [facility]. 1025: Kika RN, Dialysis, setting up HD for pt @ bedside. 1100: Carla Montes, Wound Care, assessing pt @ the bedside and assisting RN in repositioning pt. Tentative plans for wound vac placement tomorrow. 1930: Bedside shift change report given to Zaida Carmen, RN (oncoming nurse) by Kelly Khoury RN (offgoing nurse). Report included the following information SBAR, Kardex, ED Summary, OR Summary, Procedure Summary, Intake/Output, MAR, Recent Results, Cardiac Rhythm ST, and Dual Neuro Assessment.

## 2022-08-17 NOTE — PROGRESS NOTES
JADEN: Anticipate discharge to [facility] in [address] [place] [address] pending medical progress and insurance authorization. Transportation likely ALS. RUR: 14%     Emergency contact: Kelvin Choi, sister, [phone]  Almita Ortega, brother, [phone] or [phone]     Disposition: Patient admitted from [facility] 8/9 for YADY. Patient has a trach and is on a vent. Hx: paraplegia, skin ulcers, afib, chronic sepsis, cardiac arrest, CVA, chronic loya, tracheostomy ventilator, gtube, pulmonary embolism, systolic CHF, HTN, HLD, diabetes type II. CM followed up with patient's sister, Kelvin Choi, on this date. CM provided an update regarding discharge disposition. Sister is happy that patient will be coming back to the area so that she might be able to visit. CM also followed up with Jon at [facility] concerning patient's insurance status. [facility] will be initiating insurance authorization. Patient is scheduled for HD today and wound vac placement tomorrow. CM will continue to follow for discharge needs.     Sherif Mcarthur, [address] [place] [address],[address]  [phone]

## 2022-08-18 LAB
ALBUMIN SERPL-MCNC: 2.4 G/DL (ref 3.5–5)
ALBUMIN/GLOB SERPL: 0.6 {RATIO} (ref 1.1–2.2)
ALP SERPL-CCNC: 268 U/L (ref 45–117)
ALT SERPL-CCNC: 108 U/L (ref 12–78)
ANION GAP SERPL CALC-SCNC: 6 MMOL/L (ref 5–15)
AST SERPL-CCNC: 76 U/L (ref 15–37)
BASOPHILS # BLD: 0 K/UL (ref 0–0.1)
BASOPHILS NFR BLD: 0 % (ref 0–1)
BILIRUB SERPL-MCNC: 0.3 MG/DL (ref 0.2–1)
BUN SERPL-MCNC: 28 MG/DL (ref 6–20)
BUN/CREAT SERPL: 19 (ref 12–20)
CALCIUM SERPL-MCNC: 8.2 MG/DL (ref 8.5–10.1)
CHLORIDE SERPL-SCNC: 104 MMOL/L (ref 97–108)
CO2 SERPL-SCNC: 27 MMOL/L (ref 21–32)
CREAT SERPL-MCNC: 1.45 MG/DL (ref 0.7–1.3)
DIFFERENTIAL METHOD BLD: ABNORMAL
EOSINOPHIL # BLD: 0.1 K/UL (ref 0–0.4)
EOSINOPHIL NFR BLD: 1 % (ref 0–7)
ERYTHROCYTE [DISTWIDTH] IN BLOOD BY AUTOMATED COUNT: 15.2 % (ref 11.5–14.5)
GLOBULIN SER CALC-MCNC: 4.1 G/DL (ref 2–4)
GLUCOSE SERPL-MCNC: 189 MG/DL (ref 65–100)
HCT VFR BLD AUTO: 22.7 % (ref 36.6–50.3)
HGB BLD-MCNC: 7.2 G/DL (ref 12.1–17)
IMM GRANULOCYTES # BLD AUTO: 0 K/UL (ref 0–0.04)
IMM GRANULOCYTES NFR BLD AUTO: 0 % (ref 0–0.5)
LYMPHOCYTES # BLD: 0.7 K/UL (ref 0.8–3.5)
LYMPHOCYTES NFR BLD: 8 % (ref 12–49)
MAGNESIUM SERPL-MCNC: 2.3 MG/DL (ref 1.6–2.4)
MCH RBC QN AUTO: 28.3 PG (ref 26–34)
MCHC RBC AUTO-ENTMCNC: 31.7 G/DL (ref 30–36.5)
MCV RBC AUTO: 89.4 FL (ref 80–99)
MONOCYTES # BLD: 1 K/UL (ref 0–1)
MONOCYTES NFR BLD: 12 % (ref 5–13)
NEUTS SEG # BLD: 6.8 K/UL (ref 1.8–8)
NEUTS SEG NFR BLD: 79 % (ref 32–75)
NRBC # BLD: 0 K/UL (ref 0–0.01)
NRBC BLD-RTO: 0 PER 100 WBC
PLATELET # BLD AUTO: 231 K/UL (ref 150–400)
PMV BLD AUTO: 12 FL (ref 8.9–12.9)
POTASSIUM SERPL-SCNC: 3.6 MMOL/L (ref 3.5–5.1)
PROT SERPL-MCNC: 6.5 G/DL (ref 6.4–8.2)
RBC # BLD AUTO: 2.54 M/UL (ref 4.1–5.7)
RBC MORPH BLD: ABNORMAL
SODIUM SERPL-SCNC: 137 MMOL/L (ref 136–145)
WBC # BLD AUTO: 8.6 K/UL (ref 4.1–11.1)

## 2022-08-18 PROCEDURE — 74011000250 HC RX REV CODE- 250: Performed by: NURSE PRACTITIONER

## 2022-08-18 PROCEDURE — 36415 COLL VENOUS BLD VENIPUNCTURE: CPT

## 2022-08-18 PROCEDURE — 74011250636 HC RX REV CODE- 250/636: Performed by: INTERNAL MEDICINE

## 2022-08-18 PROCEDURE — 74011000258 HC RX REV CODE- 258: Performed by: INTERNAL MEDICINE

## 2022-08-18 PROCEDURE — 74011000250 HC RX REV CODE- 250: Performed by: SURGERY

## 2022-08-18 PROCEDURE — 85025 COMPLETE CBC W/AUTO DIFF WBC: CPT

## 2022-08-18 PROCEDURE — 65620000000 HC RM CCU GENERAL

## 2022-08-18 PROCEDURE — 74011250637 HC RX REV CODE- 250/637: Performed by: INTERNAL MEDICINE

## 2022-08-18 PROCEDURE — 74011250636 HC RX REV CODE- 250/636: Performed by: STUDENT IN AN ORGANIZED HEALTH CARE EDUCATION/TRAINING PROGRAM

## 2022-08-18 PROCEDURE — 74011000250 HC RX REV CODE- 250: Performed by: INTERNAL MEDICINE

## 2022-08-18 PROCEDURE — 74011250637 HC RX REV CODE- 250/637: Performed by: STUDENT IN AN ORGANIZED HEALTH CARE EDUCATION/TRAINING PROGRAM

## 2022-08-18 PROCEDURE — 80053 COMPREHEN METABOLIC PANEL: CPT

## 2022-08-18 PROCEDURE — 97606 NEG PRS WND THER DME>50 SQCM: CPT

## 2022-08-18 PROCEDURE — 74011250637 HC RX REV CODE- 250/637: Performed by: NURSE PRACTITIONER

## 2022-08-18 PROCEDURE — 77030018717 HC DRSG GRNUFM KCON -B

## 2022-08-18 PROCEDURE — 2709999900 HC NON-CHARGEABLE SUPPLY

## 2022-08-18 PROCEDURE — 83735 ASSAY OF MAGNESIUM: CPT

## 2022-08-18 PROCEDURE — 99231 SBSQ HOSP IP/OBS SF/LOW 25: CPT | Performed by: SURGERY

## 2022-08-18 PROCEDURE — 77030018798 HC PMP KT ENTRL FED COVD -A

## 2022-08-18 PROCEDURE — 74011636637 HC RX REV CODE- 636/637: Performed by: NURSE PRACTITIONER

## 2022-08-18 RX ORDER — BUMETANIDE 0.25 MG/ML
1 INJECTION INTRAMUSCULAR; INTRAVENOUS 2 TIMES DAILY
Status: DISCONTINUED | OUTPATIENT
Start: 2022-08-18 | End: 2022-08-24

## 2022-08-18 RX ADMIN — MINERAL SUPPLEMENT IRON 300 MG / 5 ML STRENGTH LIQUID 100 PER BOX UNFLAVORED 300 MG: at 11:14

## 2022-08-18 RX ADMIN — BUMETANIDE 1 MG: 0.25 INJECTION, SOLUTION INTRAMUSCULAR; INTRAVENOUS at 19:03

## 2022-08-18 RX ADMIN — Medication 5 UNITS: at 08:18

## 2022-08-18 RX ADMIN — ATORVASTATIN CALCIUM 40 MG: 40 TABLET, FILM COATED ORAL at 23:00

## 2022-08-18 RX ADMIN — MEXILETINE HYDROCHLORIDE 150 MG: 150 CAPSULE ORAL at 03:40

## 2022-08-18 RX ADMIN — PIPERACILLIN AND TAZOBACTAM 3.38 G: 3; .375 INJECTION, POWDER, FOR SOLUTION INTRAVENOUS at 06:23

## 2022-08-18 RX ADMIN — Medication 10 ML: at 23:02

## 2022-08-18 RX ADMIN — HYDROCODONE BITARTRATE AND ACETAMINOPHEN 1 TABLET: 10; 325 TABLET ORAL at 03:40

## 2022-08-18 RX ADMIN — BUMETANIDE 1 MG: 0.25 INJECTION, SOLUTION INTRAMUSCULAR; INTRAVENOUS at 08:18

## 2022-08-18 RX ADMIN — LEVOTHYROXINE SODIUM 50 MCG: 0.05 TABLET ORAL at 06:27

## 2022-08-18 RX ADMIN — CHOLESTYRAMINE 4 G: 4 POWDER, FOR SUSPENSION ORAL at 19:03

## 2022-08-18 RX ADMIN — PIPERACILLIN AND TAZOBACTAM 3.38 G: 3; .375 INJECTION, POWDER, FOR SOLUTION INTRAVENOUS at 19:03

## 2022-08-18 RX ADMIN — MEXILETINE HYDROCHLORIDE 150 MG: 150 CAPSULE ORAL at 15:16

## 2022-08-18 RX ADMIN — APIXABAN 5 MG: 5 TABLET, FILM COATED ORAL at 03:40

## 2022-08-18 RX ADMIN — MIDODRINE HYDROCHLORIDE 20 MG: 5 TABLET ORAL at 06:23

## 2022-08-18 RX ADMIN — CHOLESTYRAMINE 4 G: 4 POWDER, FOR SUSPENSION ORAL at 11:14

## 2022-08-18 RX ADMIN — Medication 10 ML: at 15:16

## 2022-08-18 RX ADMIN — COLLAGENASE SANTYL: 250 OINTMENT TOPICAL at 08:18

## 2022-08-18 RX ADMIN — HYDROMORPHONE HYDROCHLORIDE 0.5 MG: 1 INJECTION, SOLUTION INTRAMUSCULAR; INTRAVENOUS; SUBCUTANEOUS at 06:23

## 2022-08-18 RX ADMIN — CHLORHEXIDINE GLUCONATE 15 ML: 1.2 RINSE ORAL at 23:02

## 2022-08-18 RX ADMIN — FLUDROCORTISONE ACETATE 0.1 MG: 0.1 TABLET ORAL at 08:18

## 2022-08-18 RX ADMIN — MINERAL SUPPLEMENT IRON 300 MG / 5 ML STRENGTH LIQUID 100 PER BOX UNFLAVORED 300 MG: at 19:03

## 2022-08-18 RX ADMIN — MIDODRINE HYDROCHLORIDE 20 MG: 5 TABLET ORAL at 23:00

## 2022-08-18 RX ADMIN — Medication 10 ML: at 08:19

## 2022-08-18 RX ADMIN — MINERAL SUPPLEMENT IRON 300 MG / 5 ML STRENGTH LIQUID 100 PER BOX UNFLAVORED 300 MG: at 08:19

## 2022-08-18 RX ADMIN — HYDROMORPHONE HYDROCHLORIDE 0.5 MG: 1 INJECTION, SOLUTION INTRAMUSCULAR; INTRAVENOUS; SUBCUTANEOUS at 13:13

## 2022-08-18 RX ADMIN — HYDROCODONE BITARTRATE AND ACETAMINOPHEN 1 TABLET: 10; 325 TABLET ORAL at 23:00

## 2022-08-18 RX ADMIN — CHLORHEXIDINE GLUCONATE 15 ML: 1.2 RINSE ORAL at 08:19

## 2022-08-18 RX ADMIN — APIXABAN 5 MG: 5 TABLET, FILM COATED ORAL at 15:16

## 2022-08-18 RX ADMIN — EPOETIN ALFA-EPBX 14000 UNITS: 10000 INJECTION, SOLUTION INTRAVENOUS; SUBCUTANEOUS at 23:09

## 2022-08-18 NOTE — CONSULTS
Surgery Consult    Subjective:      Surgical consultation was called on Wes Leung who is a 38 y.o. male to discuss possible diverting colostomy . Praveena Butt has a history of non-ischemic cardiomyopathy and EF 15-20%, afib, PE, pum HTN, PE cardiac arrest, chronic respiratory failure and trach, acute on chronic anemia, multiple wounds chronic hypotension, diabetes, paraplegia with suprapubic catheter  Patient was seen by Dr. Peri Jackson on 8/12/2022 for debridement at bedside with NP and wound care nurse of sacral and left ulcers. Wound VAC was placed today. Concern for wound healing due to stools. No past medical history on file. No past surgical history on file.    Social History     Socioeconomic History    Marital status: SINGLE      Current Facility-Administered Medications   Medication Dose Route Frequency    bumetanide (BUMEX) injection 1 mg  1 mg IntraVENous BID    midodrine (PROAMATINE) tablet 10 mg  10 mg Oral DAILY PRN    albumin human 25% (BUMINATE) solution 12.5 g  12.5 g IntraVENous DAILY PRN    piperacillin-tazobactam (ZOSYN) 3.375 g in 0.9% sodium chloride (MBP/ADV) 100 mL MBP  3.375 g IntraVENous Q12H    midodrine (PROAMATINE) tablet 20 mg  20 mg Per G Tube Q8H    epoetin darius-epbx (RETACRIT) 14,000 Units combo injection  14,000 Units SubCUTAneous Q7D    HYDROcodone-acetaminophen (NORCO)  mg tablet 1 Tablet  1 Tablet Oral Q6H PRN    HYDROmorphone (DILAUDID) injection 0.5 mg  0.5 mg IntraVENous Q6H PRN    0.9% sodium chloride infusion 250 mL  250 mL IntraVENous PRN    collagenase (SANTYL) 250 unit/gram ointment   Topical DAILY    alteplase (CATHFLO) 1 mg in sterile water (preservative free) 1 mL injection  1 mg InterCATHeter PRN    apixaban (ELIQUIS) tablet 5 mg  5 mg Oral Q12H    atorvastatin (LIPITOR) tablet 40 mg  40 mg Oral QHS    fludrocortisone (FLORINEF) tablet 0.1 mg  0.1 mg Oral DAILY    insulin glargine (LANTUS) injection 5 Units  5 Units SubCUTAneous DAILY    [Held by provider] sacubitriL-valsartan (ENTRESTO) 24-26 mg tablet 0.5 Tablet  0.5 Tablet Oral Q12H    ferrous sulfate 300 mg (60 mg iron)/5 mL oral syrup 300 mg  300 mg Oral TID WITH MEALS    mexiletine (MEXITIL) capsule 150 mg  150 mg Oral Q12H    cholestyramine-aspartame (QUESTRAN LIGHT) packet 4 g  4 g Oral TID WITH MEALS    levothyroxine (SYNTHROID) tablet 50 mcg  50 mcg Oral 6am    albuterol-ipratropium (DUO-NEB) 2.5 MG-0.5 MG/3 ML  3 mL Nebulization Q4H PRN    heparin (porcine) 1,000 unit/mL injection 1,400 Units  1,400 Units InterCATHeter DIALYSIS PRN    And    heparin (porcine) 1,000 unit/mL injection 1,400 Units  1,400 Units InterCATHeter DIALYSIS PRN    Vancomycin - pharmacy to dose   Other Rx Dosing/Monitoring    sodium chloride (NS) flush 5-40 mL  5-40 mL IntraVENous Q8H    sodium chloride (NS) flush 5-40 mL  5-40 mL IntraVENous PRN    acetaminophen (TYLENOL) tablet 650 mg  650 mg Oral Q6H PRN    Or    acetaminophen (TYLENOL) suppository 650 mg  650 mg Rectal Q6H PRN    polyethylene glycol (MIRALAX) packet 17 g  17 g Oral DAILY PRN    ondansetron (ZOFRAN ODT) tablet 4 mg  4 mg Oral Q8H PRN    Or    ondansetron (ZOFRAN) injection 4 mg  4 mg IntraVENous Q6H PRN    chlorhexidine (PERIDEX) 0.12 % mouthwash 15 mL  15 mL Oral Q12H     Allergies   Allergen Reactions    Tape [Adhesive] Rash       Review of Systems:  Pertinent items are noted in the History of Present Illness. Objective:      Patient Vitals for the past 8 hrs:   BP Temp Pulse Resp   22 1400 117/81 -- (!) 103 22   22 1300 (!) 135/96 -- (!) 114 25   22 1200 (!) 125/93 97.4 °F (36.3 °C) (!) 101 17   22 1100 -- -- (!) 106 20   22 1000 (!) 127/92 -- 100 20   22 0900 (!) 127/92 -- (!) 101 17       Temp (24hrs), Av.6 °F (36.4 °C), Min:96.5 °F (35.8 °C), Max:98.2 °F (36.8 °C)      Physical Exam:   alert, cooperative  Wound: Wound care note      1.  POA sacral stage 4 pressure injury looks much better after debridement, still some eschar on wound edges and scattered close to wound edges, wound bed is about 95% pink and 5% scattered slough/eschar, small to moderate sero/sang drainage, wound edges are rolled, alycia-wound with scar formation. Negative pressure wound therapy applied. 2.  POA left hip stage 4 pressure injury also looks much better after debridement, area of slough in wound bed approximately 20%, remainder of wound bed is beefy red, small sero/sang drainage with a hint of green, wound edges are open, alycia-wound with hyperpigmentation. Negative pressure wound therapy applied. 3.  POA left ischial stage 4 pressure injury wound bed is mostly beefy red tissue, small superficial scattered slough proximally and distally, wound edges are open, alycia-wound with hyperpigmentation, small sero/sang drainage. Negative pressure wound therapy applied. 4.  POA right hip stage 4 pressure injury wound bed is red, small sero/sang drainage, wound edges are closed, alycia-wound with scar formation and hyperpigmentation. Negative pressure wound therapy applied. 5.  POA right ischial stage 4 pressure injury is mostly beefy red, small amount of scattered slough, wound edges are open, alycia-wound with scar formation and hyperpigmentation. Negative wound therapy applied. Skin prep applied to alycia-wounds (sacrum, left and right hips and left and right ischials) and all wounds were bridged together, drape applied in between wounds where bridges will be applied, total of 13 black granufoams , 6 Mepitel Ones, placed around black granufoam and 3 ostomy rings. Good seal.  Wound care will continue to monitor. Able to apply wound VAC using 2 large VAC dressing kits. Two more are left in his room for next dressing change along with another cannister. 6.  POA right lateral lower leg pressure injury looks better since debridement but distal end of wound still brown and dry, pink and slough tissue to proximal end.   Santyl moist to dry dressing applied. Will have outer dressing changed during night shift to keep wound be moist.  Santyl moist to dry (using VASHE), applied. 7.  POA right heel unstageable pressure injury looks the same, dry, alycia-wound without erythema. Betadine is being applied. 8.  POA right lateral ankle with superficial crusting, no drainage, alycia-wound without erythema. Left open to air. 9.  POA right medial knee with unstageable pressure injury is improving, some pink tissue not visible and remainder of wound bed is covered with slough, small serous drainage, wound edges are open. Hydrocolloid applied. 10.  POA left heel unstageable pressure injury was not assessed today. Being painted with Betadine. 11.  POA left lateral lower leg abrasion has resolved. Left open to air. Labs:   CBC:   Lab Results   Component Value Date/Time    WBC 8.6 08/18/2022 06:33 AM    RBC 2.54 (L) 08/18/2022 06:33 AM    HGB 7.2 (L) 08/18/2022 06:33 AM    HCT 22.7 (L) 08/18/2022 06:33 AM    PLATELET 200 23/44/3591 06:33 AM     BMP:   Lab Results   Component Value Date/Time    Glucose 189 (H) 08/18/2022 06:33 AM    Sodium 137 08/18/2022 06:33 AM    Potassium 3.6 08/18/2022 06:33 AM    Chloride 104 08/18/2022 06:33 AM    CO2 27 08/18/2022 06:33 AM    BUN 28 (H) 08/18/2022 06:33 AM    Creatinine 1.45 (H) 08/18/2022 06:33 AM    Calcium 8.2 (L) 08/18/2022 06:33 AM     CMP:  Lab Results   Component Value Date/Time    Glucose 189 (H) 08/18/2022 06:33 AM    Sodium 137 08/18/2022 06:33 AM    Potassium 3.6 08/18/2022 06:33 AM    Chloride 104 08/18/2022 06:33 AM    CO2 27 08/18/2022 06:33 AM    BUN 28 (H) 08/18/2022 06:33 AM    Creatinine 1.45 (H) 08/18/2022 06:33 AM    Calcium 8.2 (L) 08/18/2022 06:33 AM    Anion gap 6 08/18/2022 06:33 AM    BUN/Creatinine ratio 19 08/18/2022 06:33 AM    Alk.  phosphatase 268 (H) 08/18/2022 06:33 AM    Protein, total 6.5 08/18/2022 06:33 AM    Albumin 2.4 (L) 08/18/2022 06:33 AM    Globulin 4.1 (H) 08/18/2022 06:33 AM    A-G Ratio 0.6 (L) 08/18/2022 06:33 AM             Assessment:     38 y.o. male who presents with multiple wounds. Plan:   Dr. Nick Aguilar to discuss diverting colostomy. Thank you for allowing us to participate in the care of this patient. Total time spent with patient: 20 minutes. Signed By: Misael Perales NP     August 18, 2022       Signed By: Misael Perales NP     August 18, 2022       Patient independently interviewed and examined. Agree with NP assessment. He has history of cardiac arrest, acute kidney injury, status post tracheostomy, with chronic wounds which are poorly healing secondary to fecal contamination. Wounds have been debrided and wound VAC is now in place. We have been asked to see him for consideration for diverting colostomy to improve chances of complete wound healing. I reviewed the concept with him and he seems amenable but wants to think about it overnight. Additionally, he would need cardiac clearance given low LVEF. I will discuss this further with him, and see if cardiology would be able to see him Friday morning.

## 2022-08-18 NOTE — PROGRESS NOTES
Comprehensive Nutrition Assessment    Type and Reason for Visit: Reassess    Nutrition Recommendations/Plan:     -Nocturnal tube feeding x 13 hr: Nepro @ 75 ml/hr 6 pm to 7 am with 1 packet Prosoruce and 50 ml water flush q 4 hr during feeding    -Chocolate milk all meals    -Please document PO intake on flowsheet       Malnutrition Assessment:  Malnutrition Status: Moderate malnutrition (08/19/22 1053)    Context:  Chronic illness     Findings of the 6 clinical characteristics of malnutrition:   Energy Intake:  Unable to assess PO intake  Weight Loss:  Unable to assess     Body Fat Loss:  Mild body fat loss, Fat overlying ribs   Muscle Mass Loss:  Severe muscle mass loss, Thigh (quadriceps), Calf (gastrocnemius), Hand (interosseous), Clavicles (pectoralis &deltoids), Temples (temporalis)  Fluid Accumulation:  No significant fluid accumulation,     Strength:  Not performed        Nutrition Assessment:    Pt admitted with YADY 2/2 Sepsis with shock. PMHx: Chronic respiratory failure with trach, Dysphagia, PEG-J, Cardiomyopathy with EF 15-2-%, DM, Paraplegia, Hypothyroid. Transferred from [facility] for CRRT to treat YADY. CRRT stopped 8/14; required HD 8/17. Pt removed [place] yesterday-monitor for need to resume HD. Weaned off pressor 8/13. Chronic hypoxic respiratory failure-has tolerated TC since 8/17. Surgery debrided wounds at bedside and look better per WOCN. Wound vac placed to large sacral wound. ?Diverting colostomy-surgery re-consulted. SLP performed FEES 8/11 and diet advanced. Remains on EN support and Regular diet. Reassessed nutrient needs (see below). Nursing and patient report good appetite overall. Will transition EN to nocturnal schedule. Mr Manuela Villa declines ONS-dislikes Ensure but agreeable to getting milk (chocolate) with all meals (24 gm protein per day). Nepro formula ordered 2/2 renal failure. Recommend meeting at least 70% energy needs with nocturnal feeding.  Suggest: Nepro @ 75 ml/hr x 13 hr (6 PM to 7 AM) with 1 packet Prosource and 50 ml water 100 ml water flush q 4 hr during feeding. This will provide 975 ml, 1760 calories, 92 gm protein and 1060 ml free water (tube feeding/flush) per day to meet 73% energy needs and 96% estimated protein needs. Based nutrient needs on admission weight. Question accuracy of current weight-fluctuated the past couple of days. Today's weight reflects 16.4 kg weight gain since admission. Noted bed changed per WOCN-?zeroed. Nutritionally Significant Medications:  Lipitor, Bumex, Questran Light, Retacrit, Ferrous sulfate, Lantus, Synthroid      Estimated Daily Nutrient Needs:  Energy Requirements Based On: Kcal/kg  Weight Used for Energy Requirements: Current  Energy (kcal/day): 2400 (35 kcal/kg)  Weight Used for Protein Requirements: Admission  Protein (g/day):  (1.4-1.6g/kg)  Method Used for Fluid Requirements: Standard renal  Fluid (ml/day):      Nutrition Related Findings:   Edema: None  Last BM: 08/16/22, Loose    Wounds: Wound vac, Multiple, Stage IV, Unstageable      Current Nutrition Therapies:  Diet: Regular  Supplements: None ordered  Meal intake: Patient Vitals for the past 168 hrs:   % Diet Eaten   08/18/22 1000 1 - 25%   08/16/22 0857 26 - 50%   08/15/22 0925 76 - 100%     Nutrition Support: Nepro @ 40 ml/hr x 21 hr with 100 ml water flush q 4 hr; 2 packets Prosource daily      Anthropometric Measures:  Height: 5' 8\" (172.7 cm)  Ideal Body Weight (IBW): 154 lbs (70 kg)     Current Body Wt:  81.2 kg (179 lb 0.2 oz),           Weight Adjustment: Paraplegia  Total Amputation Percentage: 7.5  Adjusted Ideal Body Weight (lbs) (Calculated): 142.5  Adjusted Ideal Body Weight (kg) (Calculated): 64.77 kg  Adjusted % IBW (Calculated): 125.6  Adjusted BMI (Calculated): 29.2  BMI Category: Normal weight (BMI 18.5-24. 9)    Wt Readings from Last 10 Encounters:   08/19/22 81.2 kg (179 lb 0.2 oz)           Nutrition Diagnosis:  Increased nutrient needs related to increased demand for energy/nutrients as evidenced by wounds    Nutrition Interventions:   Food and/or Nutrient Delivery: Modify tube feeding, Start oral nutrition supplement  Nutrition Education/Counseling: No recommendations at this time  Coordination of Nutrition Care: Continue to monitor while inpatient, Interdisciplinary rounds       Goals:  Previous Goal Met: Goal(s) achieved  Goals:  (Meet at least 90% estimated needs x 5-7 days via EN and PO intake.)    Nutrition Monitoring and Evaluation:   Behavioral-Environmental Outcomes: None identified  Food/Nutrient Intake Outcomes: Food and nutrient intake, Supplement intake, Enteral nutrition intake/tolerance  Physical Signs/Symptoms Outcomes: GI status, Weight, Skin, Biochemical data, Meal time behavior    Discharge Planning:     Too soon to determine    Risa Cope RD CNSC  Available via [address] [place] [address]

## 2022-08-18 NOTE — PROGRESS NOTES
0730 Bedside and Verbal shift change report given to Brant Olivera (oncoming nurse) by Almita Sanchez (offgoing nurse). Report included the following information SBAR, Kardex, Procedure Summary, Intake/Output, MAR, Accordion, and Recent Results. 0800 Pt found with manjula catheter in hand upon morning assessment. Pressure applied and bleeding stopped. MD notified. Wound care @ bedside for wound vac placement. 1930 Bedside and Verbal shift change report given to Almita Sanchez (oncoming nurse) by Brant Olivera (offgoing nurse). Report included the following information SBAR, Kardex, Procedure Summary, Intake/Output, MAR, Accordion, and Recent Results.

## 2022-08-18 NOTE — PROGRESS NOTES
CRITICAL CARE NOTE      Name: Caren Yanes   : 1980   MRN: [id number]   Date: 2022      REASON FOR ICU ADMISSION:  Acute renal failure, chronic vent dependence, septic shock     PRINCIPAL ICU DIAGNOSIS   Septic shock, unclear etiology  Acute renal injury  Chronic hypoxic respiratory failure, s/p trach, vent dependent  Chronic decubitus pressure injury ulcers, present on admission  HFrEF (20-25%)  Afib  Chronic hypotension  Paraplegia    BRIEF PATIENT SUMMARY   49-year-old male with known past medical history of multiple chronic issues as listed in problem list, who presented to [facility] ED after being sent from [facility] for possible need for CRRT for YADY due to inability to tolerate iHD due to sepsis/septic shock from unclear source. COMPREHENSIVE ASSESSMENT & PLAN:SYSTEM BASED     24 HOUR EVENTS:   No acute overnight events. Remains off vasopressors. NEUROLOGICAL:   -Awake and alert in no acute distress  -history of paraplegia  - PT/OT SLP consulted  - Dysphagia with GJ tube in place, however cleared for PO by SLP    PULMONOLOGY:   -Chronic respiratory failure with trach in place ( placed 2022)  - SBT daily. Reports he was on TC completely in February. Continuous TC since noon . -PRN nebs/suctioning  -ABCDEF protocol    CARDIOVASCULAR:     - Atrial fibrillation on eliquis  -Non-ischemic cardiomyopathy with EF 15-20%  -Recent PEA arrest (multiple)  -Pulmonary hypertension with right ventricular dysfunction  -Continue midodrine, florinef, mexiletine. PRN midodrine 10mg pre-HD  - holding entresto due to hypotension    GASTROINTESTINAL     -Continue tube feeds as ordered    RENAL/ELECTROLYTE/FLUIDS:     -Nephrology following. Tolerated iHD yesterday without pressor. Patient delirious and pulled out HD cath -- will hold on replacement today.   -Trend lytes and replete as needed  -Florinef, midodrine, EPO  -Suprapubic catheter care    ENDOCRINE:   -Continue synthroid    HEMATOLOGY/ONCOLOGY:   - No signs of acute bleeding  - on eliquis    INFECTIOUS DISEASE:   Continue Vanc/Zosyn (D10/10). Wound cultures polymicrobial with light MRSA and MDR organisms. Respiratory cultures growing light Proteus mirabilis and achromobacter. Wound Vac placed today. Surgical consult for eval of diverting Ostomy. Considering overall improving status including hemodynamics and leukocytosis, we will hold off on broadening antibiotics. Unclear source, likely HAP. General surgery follow-up appreciated for decubitus ulcers. Plans for WoundVac placement. ICU DAILY CHECKLIST     Code Status:Partial (No Shocks, No Compressions) Meds okay  DVT Prophylaxis:Eliquis  T/L/D: Ye, GJ tube, Colostomy, suprapubic catheter, PIV  SUP: None  Diet: Tube Feed,PO as tolerated  Activity Level:Paraplegia  ABCDEF Bundle/Checklist Completed:Yes  Disposition: Stay in ICU. Initiate discharge planning. Multidisciplinary Rounds Completed: Yes  Patient/Family Updated: Yes       HOSPITAL COURSE/DAILY EVENT LOG   8/ pan Cx started on Vanc/Zosyn  8/9 started n CRRT levophed    SUBJECTIVE   Review of Systems   Constitutional:  Negative for chills, diaphoresis and fever. HENT:  Negative for congestion, ear pain, hearing loss, nosebleeds, sinus pain and sore throat. Eyes:  Negative for blurred vision, double vision and pain. Respiratory:  Negative for cough, sputum production, shortness of breath and wheezing. Cardiovascular:  Negative for chest pain, palpitations and orthopnea. Gastrointestinal:  Negative for abdominal pain, constipation, heartburn, nausea and vomiting. Genitourinary:  Negative for hematuria. Musculoskeletal:  Negative for falls, joint pain and myalgias. Skin:  Negative for itching and rash. Neurological:  Negative for dizziness, tremors, speech change, focal weakness, weakness and headaches. Endo/Heme/Allergies:  Does not bruise/bleed easily. Psychiatric/Behavioral:  Negative for depression. OBJECTIVE     Labs and Data: Reviewed 22  Medications: Reviewed 22  Imaging: Reviewed 22    Physical Exam  Vitals and nursing note reviewed. Constitutional:       General: He is awake. He is not in acute distress. Appearance: He is underweight. HENT:      Head: Normocephalic and atraumatic. Nose: Nose normal.      Mouth/Throat:      Comments: trach  Eyes:      General: Lids are normal.      Conjunctiva/sclera: Conjunctivae normal.      Pupils: Pupils are equal, round, and reactive to light. Neck:     Cardiovascular:      Rate and Rhythm: Normal rate. Rhythm irregularly irregular. Heart sounds: Normal heart sounds, S1 normal and S2 normal. No murmur heard. No friction rub. No gallop. Pulmonary:      Effort: Pulmonary effort is normal. No tachypnea. Breath sounds: Normal breath sounds and air entry. Comments: On vent  Abdominal:      General: The ostomy site is clean. Bowel sounds are normal.       Musculoskeletal:      Right lower leg: No edema. Left lower leg: No edema. Comments: Chronic numerous pressure ulcer wounds throughout, present on admission see wound care note for further details   Skin:     General: Skin is warm and dry. Capillary Refill: Capillary refill takes 2 to 3 seconds. Coloration: Skin is pale. Findings: Wound present. Neurological:      Mental Status: He is alert, oriented to person, place, and time and easily aroused. Mental status is at baseline. GCS: GCS eye subscore is 4. GCS verbal subscore is 5. GCS motor subscore is 6. Psychiatric:         Behavior: Behavior is cooperative.         Visit Vitals  /81   Pulse 100   Temp 98 °F (36.7 °C)   Resp 21   Ht 5' 8\" (1.727 m)   Wt 90.1 kg (198 lb 10.2 oz)   SpO2 100%   BMI 30.20 kg/m²    O2 Flow Rate (L/min): 10 l/min O2 Device: Tracheal collar Temp (24hrs), Av.9 °F (36.6 °C), Min:97.5 °F (36.4 °C), Max:98.2 °F (36.8 °C)           Intake/Output: Intake/Output Summary (Last 24 hours) at 8/18/2022 0940  Last data filed at 8/18/2022 0700  Gross per 24 hour   Intake 980 ml   Output 2015 ml   Net -1035 ml         Imaging       Pertinent imaging reviewed and interpreted independently as noted above    CRITICAL CARE DOCUMENTATION  I had a face to face encounter with the patient, reviewed and interpreted patient data including clinical events, labs, images, vital signs, I/O's, and examined patient. I have discussed the case and the plan and management of the patient's care with the consulting services, the bedside nurses and the respiratory therapist.      NOTE OF PERSONAL INVOLVEMENT IN CARE   This patient has a high probability of imminent, clinically significant deterioration, which requires the highest level of preparedness to intervene urgently. I participated in the decision-making and personally managed or directed the management of the following life and organ supporting interventions that required my frequent assessment to treat or prevent imminent deterioration. I personally spent 30 minutes of critical care time. This is time spent at this critically ill patient's bedside actively involved in patient care as well as the coordination of care. This does not include any procedural time which has been billed separately.     Joshua Ace DO  Staff Intensivist  [facility]  8/18/2022

## 2022-08-18 NOTE — WOUND CARE
[facility] Emergency Department    [address] [address]    Phone:  [phone]    Fax:  [phone]                                       Ilya Villagran   MRN: [id number]    Department:  [facility] Emergency Department   Date of Visit:  4/25/2018           After Visit Summary Signature Page     I have received my discharge instructions, and my questions have been answered. I have discussed any challenges I see with this plan with the nurse or doctor.    ..........................................................................................................................................  Patient/Patient Representative Signature      ..........................................................................................................................................  Patient Representative Print Name and Relationship to Patient    ..................................................               ................................................  Date                                            Time    ..........................................................................................................................................  Reviewed by Signature/Title    ...................................................              ..............................................  Date                                                            Time           Wound Care Note:     Follow-up visit for wound VAC application    Chart shows:  Admitted for YADY with a history of non-ischemic cardiomyopathy with EF 15 to 20%, afib, PE, pulmonary HTN, PEA cardiac arrest, chronic respiratory fialure with trach, acute on chronic anemia, multiple wounds, sysphagia, chronic hypotension, DM, paraplegia, supapubic catheter    WBC = 8.6 on 8/18/22  Admitted from [address]:   Patient is alert and mouthing words, incontinent with moderate assistance needed in repositioning. Bed: Envella  Patient has a suprapubic in place. Diet: Adult diet regular- Tube feeding - PEG  Patient pre-medicated for pain by RN. 1. POA sacral stage 4 pressure injury looks much better after debridement, still some eschar on wound edges and scattered close to wound edges, wound bed is about 95% pink and 5% scattered slough/eschar, small to moderate sero/sang drainage, wound edges are rolled, alycia-wound with scar formation. Negative pressure wound therapy applied. 2.  POA left hip stage 4 pressure injury also looks much better after debridement, area of slough in wound bed approximately 20%, remainder of wound bed is beefy red, small sero/sang drainage with a hint of green, wound edges are open, alycia-wound with hyperpigmentation. Negative pressure wound therapy applied. 3.  POA left ischial stage 4 pressure injury wound bed is mostly beefy red tissue, small superficial scattered slough proximally and distally, wound edges are open, alycia-wound with hyperpigmentation, small sero/sang drainage. Negative pressure wound therapy applied. 4.  POA right hip stage 4 pressure injury wound bed is red, small sero/sang drainage, wound edges are closed, alycia-wound with scar formation and hyperpigmentation. Negative pressure wound therapy applied.     5.  POA right ischial stage 4 pressure injury is mostly beefy red, small amount of scattered slough, wound edges are open, alycia-wound with scar formation and hyperpigmentation. Negative wound therapy applied. Skin prep applied to alycia-wounds (sacrum, left and right hips and left and right ischials) and all wounds were bridged together, drape applied in between wounds where bridges will be applied, total of 13 black granufoams , 6 Mepitel Ones, placed around black granufoam and 3 ostomy rings. Good seal.  Wound care will continue to monitor. Able to apply wound VAC using 2 large VAC dressing kits. Two more are left in his room for next dressing change along with another cannister. 6.  POA right lateral lower leg pressure injury looks better since debridement but distal end of wound still brown and dry, pink and slough tissue to proximal end. Santyl moist to dry dressing applied. Will have outer dressing changed during night shift to keep wound be moist.  Santyl moist to dry (using VASHE), applied. 7.  POA right heel unstageable pressure injury looks the same, dry, alycia-wound without erythema. Betadine is being applied. 8.  POA right lateral ankle with superficial crusting, no drainage, alycia-wound without erythema. Left open to air. 9.  POA right medial knee with unstageable pressure injury is improving, some pink tissue not visible and remainder of wound bed is covered with slough, small serous drainage, wound edges are open. Hydrocolloid applied. 10.  POA left heel unstageable pressure injury was not assessed today. Being painted with Betadine. 11.  POA left lateral lower leg abrasion has resolved. Left open to air. Spoke with Dr. Torey Rosas, wound care orders obtained. Patient repositioned supine. Heels offloaded in Prevalon boots. Recommendations:    Maintain wound VAC    VAC (NPWT) Dressing Orders:  VAC Settings: 125 mmHg continuous/low suction   Dressing change twice weekly by WOCN.   Change canisters when full and measure output q shift. (located 5E, 4W, ICU supply room and OR). For sudden development of blood or an increased amount of tammi bleedin. Immediately turn off VAC system. 2.  Leave dressing in place. 3.  Hold pressure over wound. 4.  Call physician. If vacuum fails to maintain seal or unable to manage alarm for clogged tubing for >2hrs:     1. Contact Physician    2. Cleanse wound with normal saline. 3.  Saline moistened fluff gauze to base. 4.  Cover with dry dressing. 5.  Secure with transparent film. 6.  Change q 8 hours and as needed. 7.  Notify Physician and WOCN that wound VAC dressing needs to be reapplied. If patient is discharged from our facility:   1. Remove all of equipment and sponge. 2.  Place moist dressing. 3.  Put VAC system and power cord in clear bag in utility room. (no red bags)  For procedures or when pt is off the floor:   Battery backup on our current inpatient systems lasts for 4 hours and may be used to prevent interruption of treatment- VAC should NOT be turned off. If disconnecting for more than 2 hours, with discharge, or a pt is admitted with a VAC:  Discontinue the therapy/ begin wound care orders above, return any outpatient equipment to family or transferring facility, and notify the [address] [place] [address] Nurse and ordering practitioner. Right lateral lower leg- Daily cleanse with VASHE (blue bottle in room), apply nickel thickness of Santyl to areas of slough (tan/brown tissue), moisten roll gauze with VASHE and loosely fill wounds and cover. NOTE:  At night just need to change outer dressing (roll gauze and ABD) to keep wound moist.     Left heel- Every other day paint with Betadine     Right medial knee- Please maintain Exuderm Hydrocolloid up to one week or change as needed with soiling or rolled edges. Please use adhesive remover wipes when changing. Specialty bed: Envella ordered via [employer]. Use only flat sheet and one incontinence pad.  Please call [employer] at  if not delivered and when patient discharged. Skin Care & Pressure Prevention:  Minimize layers of linen/pads under patient to optimize support surface. Turn/reposition approximately every 2 hours and offload heels.   Manage incontinence / promote continence   Nourishing Skin Cream to dry skin, minimize use of briefs when able    Discussed above plan with patient & Luis Fernando Medina RN    Transition of Care: Plan to follow as needed while admitted to hospital.    Rabia Bustamante BSN, RN, [facility]  Certified Wound and Ostomy Nurse  office [phone]  Best way to contact me is through [address] [place] [address]

## 2022-08-18 NOTE — PROGRESS NOTES
Name: Jose Manuel Stack MRN: [id number]   : 1980 Hospital: [facility]   Date: 2022        IMPRESSION:   YADY, started on iHD initially. patient became hypotensive and was switched to CRRT. --off CRRT on IHD, urine output increasing, Bp stable. Pullrd his Natalia Thompson out today   Hypotension resolved, off iv pressor, on midodrine  Hypophosphatemia- resolved  Debility with multiple pressure ulcers  Respiratory failure - on vent, weaning  Anemia of chronic disease-   Hyperkalemia - resolved with RRT  Protein deficiency  wounds      PLAN:   S/p HD , no need for HD today, will reevaluate in am if needs HD will place new manjula cathete  IV bumex 1 mg bid  C/w Midodrine 20 mg tid  Watch for GFR recovery --> hallmark will be increased urine output. Prior to starting dialysis patient's Scr was 0.8- UO increasing   Epo increased to 14K/week, po Iron  Tube feeding nepro  Poor overall prognosis. Dose meds for his GFR. Avoid NSAIDs + IV contrast.  Abx per ICU team     Subjective/Interval History:   I have reviewed the flowsheet and previous days notes. Seen on CRRT, awake and alert, denies pain/SOB with nodding head      F/U - YADY , CRRT --> 2022    Remains on CRRT + pressors. No new complaints were offered.  seen on CRRT, d/w ICU RN  8-14 CRRT stopped, BP stable on minimal Levophed, getting NeutraPhos  8/15 awake and alert, on vent. BP stable, had 175 ml UO yesterday, 150 ml today sofar. Cr 1.1. will add bumex if BP toleerates, no HD today  , awake on vent. UO has increased had 1 lit yesterday with bumex, cr increasing. BP stable, will hold HD today again   sleeping on vent, arousable, BP stable in 120s, had 1100 ml u/o and 500 overnight.  Cr higher  , awake, off vent on trach collar, pulled his manjula this am, UO up          Objective:   Vital Signs:    Visit Vitals  /81   Pulse 100   Temp 98 °F (36.7 °C)   Resp 21   Ht 5' 8\" (1.727 m)   Wt 90.1 kg (198 lb 10.2 oz) SpO2 100%   BMI 30.20 kg/m²       O2 Device: Tracheal collar   O2 Flow Rate (L/min): 10 l/min   Temp (24hrs), Av.9 °F (36.6 °C), Min:97.5 °F (36.4 °C), Max:98.2 °F (36.8 °C)       Intake/Output:   Last shift:      No intake/output data recorded. Last 3 shifts:  1901 -  0700  In: 2630 [I.V.:910]  Out: 2825 [Urine:1825]    Intake/Output Summary (Last 24 hours) at 2022 0852  Last data filed at 2022 0700  Gross per 24 hour   Intake 1030 ml   Output 2145 ml   Net -1115 ml          Physical Exam:      Seen in CCU - Bed 25. CVVH was in progress during my visit. General:    Sleeping arousable on vent    Head:   Normocephalic,  bitemporal muscle wasting. Eyes:   Conjunctivae/corneas clear. Neck:  Trach    Lungs:   Decreased breathing sounds left base,  no wheezes, no rales. Heart:   Reg, No S 3 gallop, no pericardial rub  . Abdomen:   Not distended. PEG, suprapubic urinary cath    Extremities: Muscle wasting --> 2 + upper thigh                          Leg oedema -> mild     Skin:     Eschar - heel., Large sacral wound    Psych:  Unable to evaulate    Neurologic: Responding to questions appropriately  . DATA:  Labs:  Recent Labs     22  0633 22  0350 22  0420    136 137   K 3.6 3.5 3.6    103 104   CO2 27 26 26   BUN 28* 41* 34*   CREA 1.45* 1.81* 1.57*   CA 8.2* 7.9* 7.8*   ALB 2.4* 2.9* 2.4*   PHOS  --  3.5 3.0   MG 2.3 2.6* 2.5*       Recent Labs     22  0633   WBC 8.6   HGB 7.2*   HCT 22.7*          No results for input(s): YAMIL, KU, CLU, CREAU in the last 72 hours.     No lab exists for component: PROU    Total time spent with patient:           Care Plan discussed with:    ICU RN and Dr Jerson Donnelly MD

## 2022-08-19 ENCOUNTER — APPOINTMENT (OUTPATIENT)
Dept: NON INVASIVE DIAGNOSTICS | Age: 42
DRG: 853 | End: 2022-08-19
Attending: INTERNAL MEDICINE
Payer: MEDICARE

## 2022-08-19 ENCOUNTER — APPOINTMENT (OUTPATIENT)
Dept: GENERAL RADIOLOGY | Age: 42
DRG: 853 | End: 2022-08-19
Attending: INTERNAL MEDICINE
Payer: MEDICARE

## 2022-08-19 ENCOUNTER — APPOINTMENT (OUTPATIENT)
Dept: GENERAL RADIOLOGY | Age: 42
DRG: 853 | End: 2022-08-19
Attending: HOSPITALIST
Payer: MEDICARE

## 2022-08-19 PROBLEM — E44.0 MODERATE PROTEIN-CALORIE MALNUTRITION (HCC): Status: ACTIVE | Noted: 2022-08-19

## 2022-08-19 LAB
ALBUMIN SERPL-MCNC: 2.3 G/DL (ref 3.5–5)
ALBUMIN/GLOB SERPL: 0.5 {RATIO} (ref 1.1–2.2)
ALP SERPL-CCNC: 236 U/L (ref 45–117)
ALT SERPL-CCNC: 90 U/L (ref 12–78)
ANION GAP SERPL CALC-SCNC: 6 MMOL/L (ref 5–15)
AST SERPL-CCNC: 57 U/L (ref 15–37)
BASOPHILS # BLD: 0 K/UL (ref 0–0.1)
BASOPHILS NFR BLD: 0 % (ref 0–1)
BILIRUB SERPL-MCNC: 0.3 MG/DL (ref 0.2–1)
BUN SERPL-MCNC: 41 MG/DL (ref 6–20)
BUN/CREAT SERPL: 24 (ref 12–20)
CALCIUM SERPL-MCNC: 8.4 MG/DL (ref 8.5–10.1)
CHLORIDE SERPL-SCNC: 104 MMOL/L (ref 97–108)
CO2 SERPL-SCNC: 26 MMOL/L (ref 21–32)
CREAT SERPL-MCNC: 1.73 MG/DL (ref 0.7–1.3)
DIFFERENTIAL METHOD BLD: ABNORMAL
ECHO EST RA PRESSURE: 3 MMHG
ECHO RIGHT VENTRICULAR SYSTOLIC PRESSURE (RVSP): 43 MMHG
ECHO RV TAPSE: 1.8 CM (ref 1.7–?)
ECHO TV REGURGITANT MAX VELOCITY: 3.15 M/S
ECHO TV REGURGITANT PEAK GRADIENT: 40 MMHG
EOSINOPHIL # BLD: 0.1 K/UL (ref 0–0.4)
EOSINOPHIL NFR BLD: 1 % (ref 0–7)
ERYTHROCYTE [DISTWIDTH] IN BLOOD BY AUTOMATED COUNT: 15.3 % (ref 11.5–14.5)
GLOBULIN SER CALC-MCNC: 4.2 G/DL (ref 2–4)
GLUCOSE SERPL-MCNC: 189 MG/DL (ref 65–100)
HCT VFR BLD AUTO: 22.1 % (ref 36.6–50.3)
HGB BLD-MCNC: 7.1 G/DL (ref 12.1–17)
IMM GRANULOCYTES # BLD AUTO: 0 K/UL (ref 0–0.04)
IMM GRANULOCYTES NFR BLD AUTO: 0 % (ref 0–0.5)
LYMPHOCYTES # BLD: 0.7 K/UL (ref 0.8–3.5)
LYMPHOCYTES NFR BLD: 7 % (ref 12–49)
MAGNESIUM SERPL-MCNC: 2.5 MG/DL (ref 1.6–2.4)
MCH RBC QN AUTO: 28.4 PG (ref 26–34)
MCHC RBC AUTO-ENTMCNC: 32.1 G/DL (ref 30–36.5)
MCV RBC AUTO: 88.4 FL (ref 80–99)
MONOCYTES # BLD: 1.2 K/UL (ref 0–1)
MONOCYTES NFR BLD: 13 % (ref 5–13)
NEUTS SEG # BLD: 7.5 K/UL (ref 1.8–8)
NEUTS SEG NFR BLD: 79 % (ref 32–75)
NRBC # BLD: 0 K/UL (ref 0–0.01)
NRBC BLD-RTO: 0 PER 100 WBC
PLATELET # BLD AUTO: 274 K/UL (ref 150–400)
PMV BLD AUTO: 11 FL (ref 8.9–12.9)
POTASSIUM SERPL-SCNC: 3.6 MMOL/L (ref 3.5–5.1)
PROT SERPL-MCNC: 6.5 G/DL (ref 6.4–8.2)
RBC # BLD AUTO: 2.5 M/UL (ref 4.1–5.7)
RBC MORPH BLD: ABNORMAL
RBC MORPH BLD: ABNORMAL
SODIUM SERPL-SCNC: 136 MMOL/L (ref 136–145)
WBC # BLD AUTO: 9.5 K/UL (ref 4.1–11.1)

## 2022-08-19 PROCEDURE — 74011000250 HC RX REV CODE- 250: Performed by: NURSE PRACTITIONER

## 2022-08-19 PROCEDURE — 83735 ASSAY OF MAGNESIUM: CPT

## 2022-08-19 PROCEDURE — 36415 COLL VENOUS BLD VENIPUNCTURE: CPT

## 2022-08-19 PROCEDURE — 99231 SBSQ HOSP IP/OBS SF/LOW 25: CPT | Performed by: SURGERY

## 2022-08-19 PROCEDURE — 71045 X-RAY EXAM CHEST 1 VIEW: CPT

## 2022-08-19 PROCEDURE — 0BC78ZZ EXTIRPATION OF MATTER FROM LEFT MAIN BRONCHUS, VIA NATURAL OR ARTIFICIAL OPENING ENDOSCOPIC: ICD-10-PCS | Performed by: INTERNAL MEDICINE

## 2022-08-19 PROCEDURE — 85025 COMPLETE CBC W/AUTO DIFF WBC: CPT

## 2022-08-19 PROCEDURE — 74011250637 HC RX REV CODE- 250/637: Performed by: NURSE PRACTITIONER

## 2022-08-19 PROCEDURE — 93306 TTE W/DOPPLER COMPLETE: CPT

## 2022-08-19 PROCEDURE — 80053 COMPREHEN METABOLIC PANEL: CPT

## 2022-08-19 PROCEDURE — 76010000379 HC BRONCHOSCOPY DIAG/THERAPEUTIC

## 2022-08-19 PROCEDURE — 74011250637 HC RX REV CODE- 250/637: Performed by: INTERNAL MEDICINE

## 2022-08-19 PROCEDURE — 74011636637 HC RX REV CODE- 636/637: Performed by: NURSE PRACTITIONER

## 2022-08-19 PROCEDURE — 92526 ORAL FUNCTION THERAPY: CPT

## 2022-08-19 PROCEDURE — 92507 TX SP LANG VOICE COMM INDIV: CPT

## 2022-08-19 PROCEDURE — 74011250636 HC RX REV CODE- 250/636

## 2022-08-19 PROCEDURE — 74011000250 HC RX REV CODE- 250: Performed by: INTERNAL MEDICINE

## 2022-08-19 PROCEDURE — 65620000000 HC RM CCU GENERAL

## 2022-08-19 RX ORDER — PEPPERMINT OIL
SPIRIT ORAL AS NEEDED
Status: DISCONTINUED | OUTPATIENT
Start: 2022-08-19 | End: 2022-09-01 | Stop reason: HOSPADM

## 2022-08-19 RX ORDER — PROPOFOL 10 MG/ML
200 INJECTION, EMULSION INTRAVENOUS
Status: COMPLETED | OUTPATIENT
Start: 2022-08-19 | End: 2022-08-19

## 2022-08-19 RX ORDER — PROPOFOL 10 MG/ML
INJECTION, EMULSION INTRAVENOUS
Status: COMPLETED
Start: 2022-08-19 | End: 2022-08-19

## 2022-08-19 RX ORDER — PHENYLEPHRINE HCL IN 0.9% NACL 0.4MG/10ML
SYRINGE (ML) INTRAVENOUS
Status: DISPENSED
Start: 2022-08-19 | End: 2022-08-20

## 2022-08-19 RX ADMIN — MIDODRINE HYDROCHLORIDE 20 MG: 5 TABLET ORAL at 14:28

## 2022-08-19 RX ADMIN — Medication: at 23:07

## 2022-08-19 RX ADMIN — MINERAL SUPPLEMENT IRON 300 MG / 5 ML STRENGTH LIQUID 100 PER BOX UNFLAVORED 300 MG: at 16:48

## 2022-08-19 RX ADMIN — PROPOFOL 50 MG: 10 INJECTION, EMULSION INTRAVENOUS at 16:20

## 2022-08-19 RX ADMIN — MIDODRINE HYDROCHLORIDE 20 MG: 5 TABLET ORAL at 21:42

## 2022-08-19 RX ADMIN — Medication 10 ML: at 21:42

## 2022-08-19 RX ADMIN — APIXABAN 5 MG: 5 TABLET, FILM COATED ORAL at 14:28

## 2022-08-19 RX ADMIN — CHOLESTYRAMINE 4 G: 4 POWDER, FOR SUSPENSION ORAL at 16:48

## 2022-08-19 RX ADMIN — Medication 10 ML: at 06:22

## 2022-08-19 RX ADMIN — Medication 10 ML: at 14:29

## 2022-08-19 RX ADMIN — APIXABAN 5 MG: 5 TABLET, FILM COATED ORAL at 04:37

## 2022-08-19 RX ADMIN — BUMETANIDE 1 MG: 0.25 INJECTION, SOLUTION INTRAMUSCULAR; INTRAVENOUS at 16:48

## 2022-08-19 RX ADMIN — Medication 5 UNITS: at 09:19

## 2022-08-19 RX ADMIN — LEVOTHYROXINE SODIUM 50 MCG: 0.05 TABLET ORAL at 06:16

## 2022-08-19 RX ADMIN — MEXILETINE HYDROCHLORIDE 150 MG: 150 CAPSULE ORAL at 04:37

## 2022-08-19 RX ADMIN — ATORVASTATIN CALCIUM 40 MG: 40 TABLET, FILM COATED ORAL at 21:42

## 2022-08-19 RX ADMIN — CHOLESTYRAMINE 4 G: 4 POWDER, FOR SUSPENSION ORAL at 12:34

## 2022-08-19 RX ADMIN — BUMETANIDE 1 MG: 0.25 INJECTION, SOLUTION INTRAMUSCULAR; INTRAVENOUS at 09:18

## 2022-08-19 RX ADMIN — MEXILETINE HYDROCHLORIDE 150 MG: 150 CAPSULE ORAL at 16:48

## 2022-08-19 RX ADMIN — FLUDROCORTISONE ACETATE 0.1 MG: 0.1 TABLET ORAL at 09:19

## 2022-08-19 RX ADMIN — MINERAL SUPPLEMENT IRON 300 MG / 5 ML STRENGTH LIQUID 100 PER BOX UNFLAVORED 300 MG: at 12:34

## 2022-08-19 RX ADMIN — CHLORHEXIDINE GLUCONATE 15 ML: 1.2 RINSE ORAL at 20:24

## 2022-08-19 RX ADMIN — CHLORHEXIDINE GLUCONATE 15 ML: 1.2 RINSE ORAL at 09:30

## 2022-08-19 NOTE — PROGRESS NOTES
Patient is progressing towards goal. Patient central dressing cleaned, no sign of infection present.

## 2022-08-19 NOTE — PROGRESS NOTES
Name: Jake Pascual MRN: [id number]   : 1980 Hospital: [address]   Date: 2022        IMPRESSION:   YADY, started on iHD initially. patient became hypotensive and was switched to CRRT. --off CRRT on IHD, urine output increasing, Bp stable. Pullrd his G[place] Kelp out today   Hypotension resolved, off iv pressor, on midodrine  Hypophosphatemia- resolved  Debility with multiple pressure ulcers  Respiratory failure - on vent, weaning  Anemia of chronic disease-   Hyperkalemia - resolved with RRT  Protein deficiency  wounds      PLAN:   S/p HD , no need for HD today (). Will reevaluate for HD tomorrow (). IV bumex 1 mg bid  C/w Midodrine 20 mg tid  Watch for GFR recovery --> hallmark will be increased urine output. Prior to starting dialysis patient's Scr was 0.8- UO increasing   Epo increased to 14K/week, po Iron  Tube feeding nepro  Poor overall prognosis. Dose meds for his GFR. Avoid NSAIDs + IV contrast.  Abx per ICU team     Subjective/Interval History:   I have reviewed the flowsheet and previous days notes. Seen on CRRT, awake and alert, denies pain/SOB with nodding head      F/U - YADY , CRRT --> 2022    Remains on CRRT + pressors. No new complaints were offered.  seen on CRRT, d/w ICU RN  8-14 CRRT stopped, BP stable on minimal Levophed, getting NeutraPhos  8/15 awake and alert, on vent. BP stable, had 175 ml UO yesterday, 150 ml today sofar. Cr 1.1. will add bumex if BP toleerates, no HD today  , awake on vent. UO has increased had 1 lit yesterday with bumex, cr increasing. BP stable, will hold HD today again   sleeping on vent, arousable, BP stable in 120s, had 1100 ml u/o and 500 overnight. Cr higher  , awake, off vent on trach collar, pulled his manjula this am, UO up       - F/U - YADY, Hx of HD --> 22    No new complaints.     Objective:   Vital Signs:    Visit Vitals  /86   Pulse (!) 108   Temp 97 °F (36.1 °C)   Resp 28   Ht 5' 8\" (1.727 m)   Wt 81.2 kg (179 lb 0.2 oz)   SpO2 100%   BMI 27.22 kg/m²       O2 Device: Tracheal collar   O2 Flow Rate (L/min): 10 l/min   Temp (24hrs), Av.3 °F (36.3 °C), Min:97 °F (36.1 °C), Max:97.5 °F (36.4 °C)       Intake/Output:   Last shift:      701 - 1900  In: 200   Out: 300 [Urine:300]  Last 3 shifts: 1901 -  0700  In: 3634 [P.O.:100; I.V.:200]  Out: 1050 [Urine:1050]    Intake/Output Summary (Last 24 hours) at 2022 1134  Last data filed at 2022 0800  Gross per 24 hour   Intake 920 ml   Output 850 ml   Net 70 ml          Physical Exam:      Seen in CCU - Bed 25. General:    Awake. Head:   Normocephalic,  bitemporal muscle wasting. Eyes:   Conjunctivae/corneas clear. Neck:  Trach    Lungs:   no wheezes, no rales, no rhonchi. Heart:   Reg, No S 3 gallop, no pericardial rub  . Abdomen:   Not distended. PEG, suprapubic urinary cath    Extremities: Muscle wasting --> 2 + upper thigh                          Leg oedema -> mild     Skin:     Eschar - heel., Large sacral wound    Psych:  Calm    Neurologic: Responding to questions appropriately  . DATA:  Labs:  Recent Labs     22  0633 22  0350    137 136   K 3.6 3.6 3.5    104 103   CO2 26 27 26   BUN 41* 28* 41*   CREA 1.73* 1.45* 1.81*   CA 8.4* 8.2* 7.9*   ALB 2.3* 2.4* 2.9*   PHOS  --   --  3.5   MG 2.5* 2.3 2.6*       Recent Labs     22  0633   WBC 9.5 8.6   HGB 7.1* 7.2*   HCT 22.1* 22.7*    231       No results for input(s): YAMIL, KU, CLU, CRETIM in the last 72 hours.     No lab exists for component: PROU    Total time spent with patient:           Care Plan discussed with:    Mr Nelli Berry MD

## 2022-08-19 NOTE — H&P
[address] [facility] Adult  Hospitalist Group     ICU Transfer/Accept Summary     [place] [address] [place]  DATE OF TRANSFER: 8/19/2022       PATIENT ID: Martha Zhao  MRN: [id number]   YOB: 1980    PRIMARY CARE PROVIDER: Jennifer Boss MD   DATE OF ADMISSION: 8/8/2022  7:15 PM    ATTENDING PHYSICIAN: Leti Cross MD  CONSULTATIONS:   IP CONSULT TO NEPHROLOGY  IP CONSULT TO GENERAL SURGERY    PROCEDURES/SURGERIES:   Procedure(s):  COLOSTOMY LAPAROSCOPIC  ESSENTIAL  REQ TF    REASON FOR ADMISSION: <principal problem not specified>     HOSPITAL PROBLEM LIST:  Patient Active Problem List   Diagnosis Code    YADY (acute kidney injury) ([facility] N17.9    HFrEF (heart failure with reduced ejection fraction) ([facility] I50.20    Moderate protein-calorie malnutrition ([facility] E44.0         Brief HPI and Hospital Course:    Hospitalist service was asked to assume care of patient as he was felt stable for transfer out of the ICU. Patient is unable to provide history. History was obtained solely from chart review  This is a 77-year-old male with a past medical history of multiple chronic conditions was referred to [facility] ED from [facility]. Patient developed acute kidney failure due to sepsis and due to ongoing hypotension he was deemed to be not a candidate for intermittent dialysis and he would need CRRT as such he was transferred to [facility].  Patient has tracheostomy  And on and off ventilator dependence. Received broad-spectrum antibiotics with vancomycin and Zosyn. Had required vasopressor support. Currently being diuresed with support of oral midodrine. He is weaned off vent for 8 hours ago. I saw and examined patient in the CCU. Patient is tachypneic and I ordered a stat CXR. The x-ray showed left lung collapse and patient will need bronchoscopy, transfer was canceled. Assessment and Plan:  Left lung collapse: Patient will need bronc.   Transferred cancelled  Chronic hypoxic respiratory failure status post trach, on and off vent dependent. Currently on trach collar  Septic shock, unclear etiology, resolved. YADY likely due to ATN, off CRRT on IHD, adequate urine output and IHD on hold. Chronic stage IV DU, wound management with wound vacuum. Being considered for diverting colostomy. A. fib with intermittent RVR, anticoagulated with apixaban  Chronic hypotension, on Florinef and midodrine  Paraplegia, functional quadriplegia. Status post suprapubic catheter  Diabetes: Monitor blood glucose with Accu-Cheks, treat hyperglycemia with Humalog sliding scale, Lantus. HFrEF, unable to assess NYHA class. Being diuresed with Bumex, unable to tolerate GDMT due to hypotension he is requiring Florinef and midodrine. Hypothyroidism: Continue levothyroxine. Acute on chronic anemia. Continue Epogen and iron supplement. Lines/drains  Right arm PICC line placed on 8/8  PEG/gastrostomy in place. Suprapubic catheter in place. Wound vacuum in place. Tracheostomy    Body mass index is 27.22 kg/m². CODE STATUS: Partial, no CPR or medications. Okay to go on the ventilator via tracheostomy    Disposition: Likely LTAC  Transferred out of the ICU canceled due to newly detected left lung collapse           PHYSICAL EXAMINATION:  Visit Vitals  BP (!) 140/91   Pulse 100   Temp 97.7 °F (36.5 °C)   Resp 14   Ht 5' 8\" (1.727 m)   Wt 81.2 kg (179 lb 0.2 oz)   SpO2 100%   BMI 27.22 kg/m²       General:          Alert, tachypneic  HEENT:           Atraumatic, MMM            Neck:               Tracheostomy in place with trach collar on oxygen 10 L/min  Lungs: Tachypneic, diminished air entry. Heart:              Tachycardic, irregular  Abdomen:       PEG and SPC present, normoactive  Extremities:     N emaciated. Boots in place. Right leg dressing in place. Skin:               dry skin  Neurologic:      Alert, awake, did not talk much for me.     Labs:     Recent Labs     08/19/22 0445 08/18/22 0633   WBC 9.5 8.6   HGB 7.1* 7.2*   HCT 22.1* 22.7*    231     Recent Labs     08/19/22 0445 08/18/22 0633 08/17/22  0350    137 136   K 3.6 3.6 3.5    104 103   CO2 26 27 26   BUN 41* 28* 41*   CREA 1.73* 1.45* 1.81*   * 189* 124*   CA 8.4* 8.2* 7.9*   MG 2.5* 2.3 2.6*   PHOS  --   --  3.5     Recent Labs     08/19/22 0445 08/18/22 0633 08/17/22  0350   ALT 90* 108*  --    * 268*  --    TBILI 0.3 0.3  --    TP 6.5 6.5  --    ALB 2.3* 2.4* 2.9*   GLOB 4.2* 4.1*  --      No results for input(s): INR, PTP, APTT, INREXT in the last 72 hours. No results for input(s): FE, TIBC, PSAT, FERR in the last 72 hours. No results found for: FOL, RBCF   No results for input(s): PH, PCO2, PO2 in the last 72 hours. No results for input(s): CPK, CKNDX, TROIQ in the last 72 hours.     No lab exists for component: CPKMB  No results found for: CHOL, CHOLX, CHLST, CHOLV, HDL, HDLP, LDL, LDLC, DLDLP, TGLX, TRIGL, TRIGP, CHHD, CHHDX  Lab Results   Component Value Date/Time    Glucose (POC) 126 (H) 08/15/2022 08:07 AM    Glucose (POC) 138 (H) 08/14/2022 09:20 AM    Glucose (POC) 124 (H) 08/13/2022 12:37 AM    Glucose (POC) 163 (H) 08/12/2022 07:05 PM    Glucose (POC) 154 (H) 08/12/2022 12:38 PM     Lab Results   Component Value Date/Time    Color YELLOW/STRAW 08/09/2022 09:31 AM    Appearance TURBID (A) 08/09/2022 09:31 AM    Specific gravity 1.012 08/09/2022 09:31 AM    pH (UA) 6.5 08/09/2022 09:31 AM    Protein 100 (A) 08/09/2022 09:31 AM    Glucose Negative 08/09/2022 09:31 AM    Ketone Negative 08/09/2022 09:31 AM    Bilirubin Negative 08/09/2022 09:31 AM    Urobilinogen 0.2 08/09/2022 09:31 AM    Nitrites Negative 08/09/2022 09:31 AM    Leukocyte Esterase LARGE (A) 08/09/2022 09:31 AM    Epithelial cells FEW 08/09/2022 09:31 AM    Bacteria 1+ (A) 08/09/2022 09:31 AM    WBC 10-20 08/09/2022 09:31 AM    RBC 0-5 08/09/2022 09:31 AM         CODE STATUS:   Full Code DNR    Partial    Comfort Care       Signed:    Oneyda Woodward MD  Date of Service:  8/19/2022  5:12 PM

## 2022-08-19 NOTE — PROGRESS NOTES
Problem: Dysphagia (Adult)  Goal: *Acute Goals and Plan of Care (Insert Text)  Description: Speech Therapy Goals  Initiated 8/11/22    1. Patient will tolerate regular diet/thin liquids without adverse effects within 7 days. Outcome: Progressing Towards Goal     Problem: Voice Impaired (Adult)  Goal: *Acute Goals and Plan of Care (Insert Text)  Description: Speech Therapy Goals  Initiated 8/11/22    1) Pt will tolerate the speaking valve without adverse effects within 7 days. Outcome: Progressing Towards Goal     SPEECH LANGUAGE PATHOLOGY DYSPHAGIA TREATMENT  Patient: Thu Whitman (45 y.o. male)  Date: 8/19/2022  Diagnosis: YADY (acute kidney injury) ([facility] [address] [N17.9] <principal problem not specified>  Procedure(s) (LRB):  COLOSTOMY LAPAROSCOPIC  ESSENTIAL  REQ TF (N/A)    Precautions:       ASSESSMENT:  Patient now on trach collar, 50% FiO2 with #6 Shiley cuffed trach, cuff deflated. PMV placed and patient with stable vitals (RR 24-35, O2 sats high 90's, -115) compared to prior to [facility] placement. However, patient aphonic and unable to phonate for sustained /a/, cough, or throat clear (note he was able to phonate while on the vent). Patient tolerated thin liquids with no difficulty. PLAN:  Recommendations and Planned Interventions:  -PMV when staff present in room, and encourage patient to continue attempts to phonate. Note patient was able to phonate while on the ventilator, however question if ventilator helped him with phonation  -Continue regular/thin liquid diet  Patient continues to benefit from skilled intervention to address the above impairments. Continue treatment per established plan of care. Speaking Valve Placement:  SLP / RT / RN only  Recommended Speaking Valve Wearing Schedule:  [x]    As tolerated when staff present  Discharge Recommendations: To Be Determined     SUBJECTIVE:   Patient mouthed words but unable to phonate.     OBJECTIVE:   Cognitive and Communication Status:  Neurologic State: Alert  Orientation Level: Oriented to person  Cognition: Follows commands  Perception: Appears intact  Perseveration: No perseveration noted  Safety/Judgement: Insight into deficits  Tracheostomy:        Oxygen Therapy  O2 Sat (%): 100 %  O2 Device: Tracheal collar  Skin Assessment: Clean, dry, & intact  Skin Protection for O2 Device: Yes  Orientation: Anterior  Location: Neck  Interventions: Mouth Care  FIO2 (%): 40 %  Dysphagia Treatment:  Tracheostomy:  Airway Clearance  Suction: Trach  Suction Device: Formula XOkauer  Sputum Method Obtained: Oral  Sputum Amount: Small  Sputum Color/Odor: Clear  Sputum Consistency: Thin     PMV Trial   Vocal Quality: Aphonic; Other (comment) (despite PMV in place)  Oral Assessment:     P.O. Trials:  Patient Position: upright in bed  Vocal quality prior to P.O.: Aphonic; Other (comment) (despite PMV in place)  Consistency Presented: Thin liquid  How Presented: SLP-fed/presented;Straw     Bolus Acceptance: No impairment  Bolus Formation/Control: No impairment     Propulsion: No impairment  Oral Residue: None        Aspiration Signs/Symptoms: None  Pharyngeal Phase Characteristics: Multiple swallows             Pain:  Pain Scale 1: Numeric (0 - 10)  Pain Intensity 1: 0       After treatment:   Patient left in no apparent distress in bed, Call bell within reach, and Nursing notified    COMMUNICATION/EDUCATION:   Patient was educated regarding his deficit(s) of aphonia as this relates to his diagnosis. He demonstrated Good understanding as evidenced by nodding. The patient's plan of care including recommendations, planned interventions, and recommended diet changes were discussed with: Registered nurse. Education was provided to patient, family, and staff regarding speaking valve placement, wearing schedule, safety precautions including cuff deflation and removal when sleeping, and care/cleaning guidelines.       TUSHAR Muñiz  Time Calculation: 16 mins

## 2022-08-19 NOTE — PROGRESS NOTES
CXR showed left lung collapse and he needed bronchoscopy. Patient to stay in the ICU, transfer canceled.

## 2022-08-19 NOTE — PROGRESS NOTES
Stable overnight. Discussed concerns with his attending regarding ongoing Eliquis use, no recent evaluation of cardiac function. Recommended holding Eliquis, and echocardiogram to assess left ventricular ejection fraction (earlier recorded at 15-20%). We will plan for laparoscopic diverting colostomy for next week. Continue local wound care, monitoring of renal function.

## 2022-08-19 NOTE — PROCEDURES
[facility]  Bronchoscopy Procedure Note    Procedure: Therapeutic bronchoscopy. Diagnosis:  Left lung atelectasis/mucous plug    Indication:  Abnormal chest imaging    Consent/Treatment:  Informed consent was obtained from the  patient after risks, benefits and alternatives were explained. Timeout verified the correct patient and correct procedure. Anesthesia:   Topical sedation to nares, mouth, and tracheobronchial tree with lidocaine    The following parameters were monitored: oxygen saturation, heart rate, blood pressure, respiratory rate, EKG, adequacy of pulmonary ventilation and response to care. Total physician intraservice time was 25    Procedure Details:   -- The bronchoscope was introduced through an endotracheal tube. -- The trachea and crow were completely inspected and noted to be normal.  -- The right-sided endobronchial anatomy was completely inspected and were found to be normal.  -- The left-sided endobronchial anatomy was completely inspected and noted to be completely occluded with thick clear secretions which were removed with saline installation and suctioning. Rapid On-Site Evaluation: A preliminary diagnosis of mucous plugs.     Complications: none    Estimated Blood Loss: Minimal    Yennifer Trevino MD   Staff Anesthesiologist/Intensivist  [facility] [facility]

## 2022-08-19 NOTE — PROGRESS NOTES
0730 Report received from [place], [facility]. Pt currently on TC 40% tolerating well. 1130 Nephro @bedside. No plans for HD today, will reevaluate tomorrow. 1200 Gen Surgery @bedside. Plans for Echo today and possible diverting colostomy next week. 1300 Transfer order in place for intermediate care. 1330 Pt refusing to lunch tray. 1400 Echo @bedside. [address] @bedside. TF stopped for Cyclic feed orders placed by Dr. Bernadette nguyen. 1500 Dr. Nicole Medina @bedside assessing pt.     3409 CXR @bedside. [address] @bedside. Wound Vac @125cc functioning properly. 1600 S/w Dr Magali Jang, Radiologist. CXR shows no aeration to left lung, complete collapse d/t possible mucous plug. Made Dr. Bernadette nguyen and Hospitalist aware, plans for Bronchoscopy. 65 Dr. Bernadette nguyen discussing Bronch with pt @bedside. 100[address] [facility] [address] Bernadette nguyen and RT @bedside performing Bronch. 1620 Noted dyspnea, pt lightly sedated with Propofol IVP per Dr. Bernadette nguyen. 1630 Procedure completed. Pt placed on AC PC on Ventilator R 16, P8, 50%. Post operative CXR due at 1800, plans to place pt back on TC @that time. Pt to stay in CCU tonight, nursing supervisor made aware. 1700 Wound care completed, Trach care completed with exchange of inner cannula, incontinence care performed with new linens. 1810 CXR @bedside. Pt too drowsy to be placed back on TC @this time.

## 2022-08-19 NOTE — PROGRESS NOTES
Physician Progress Note      PATIENT:               Adri Gonzalez  CSN #:                  [id number]  :                       1980  ADMIT DATE:       2022 7:15 PM  DISCH DATE:  RESPONDING  PROVIDER #:        Rashawn VALENCIA MD          QUERY TEXT:    Patient admitted with Sepsis. Per Wound care and PN note dated 22 documentation of bedside debridement. To accurately reflect the procedure performed please document if debridement was excisional or nonexcisional and the deepest depth of tissue removed as down to and including: The medical record reflects the following:    Risk Factors: NICM, A/C Anemia, Chronic Hypotension, DM type 2, Paraplegia    Clinical Indicators:     PN    - Sacral and left ulcers:  Debrided at bedside with NP and wound care nurse  - Continue local wound care  - Will likely need further debridement with wound care team  - Continue antibiotic therapy     Azar Barnes and Neel Martinez, NP at bedside for debridement of sacrum, left hip and right lateral lower leg. Wound cultures polymicrobial with light MRSA. Treatment: Continue with current wound care except sacrum, left hip and right lateral lower leg will also have Santyl ointment to finish off debridement.  WOCN, bedside debridement, Daily wound care, Vancomycin per pharmacy dosing, Zosyn 3.375gm IV Q 12hrs    Thank you,  RASHIDA KoN, RN, CCRN  [phone]  I am also available via PS  Options provided:  -- Nonexcisional debridement of skin  -- Excisional debridement of skin  -- Nonexcisional debridement of subcutaneous tissue  -- Excisional debridement of subcutaneous tissue  -- Nonexcisional debridement of fascia  -- Excisional debridement of fascia  -- Nonexcisional debridement of muscle  -- Excisional debridement of muscle  -- Nonexcisional debridement of bone  -- Excisional debridement of bone  -- Other - I will add my own diagnosis  -- Disagree - Not applicable / Not valid  -- Disagree - Clinically unable to determine / Unknown  -- Refer to Clinical Documentation Reviewer    PROVIDER RESPONSE TEXT:    Non-excisional debridement of skin was performed of during procedure on 8/11/22.     Query created by: Taylor Arias on 8/15/2022 3:26 PM      Electronically signed by:  Conchis King MD 8/19/2022 6:33 PM

## 2022-08-19 NOTE — PROGRESS NOTES
CRITICAL CARE NOTE      Name: Wilton Romero   : 1980   MRN: [id number]   Date: 2022      REASON FOR ICU ADMISSION:  Acute renal failure, chronic vent dependence, septic shock     PRINCIPAL ICU DIAGNOSIS   Septic shock, unclear etiology  Acute renal injury  Chronic hypoxic respiratory failure, s/p trach, vent dependent  Chronic decubitus pressure injury ulcers, present on admission  HFrEF (20-25%)  Afib  Chronic hypotension  Paraplegia    BRIEF PATIENT SUMMARY   64-year-old male with known past medical history of multiple chronic issues as listed in problem list, who presented to [facility] ED after being sent from [facility] for possible need for CRRT for YADY due to inability to tolerate iHD due to sepsis/septic shock from unclear source. COMPREHENSIVE ASSESSMENT & PLAN:SYSTEM BASED     24 HOUR EVENTS:   No acute overnight events. Remains off vasopressors. NEUROLOGICAL:   -Awake and alert in no acute distress  -history of paraplegia  - PT/OT SLP consulted  - Dysphagia with GJ tube in place, however cleared for PO by SLP    PULMONOLOGY:   -Chronic respiratory failure with trach in place ( placed 2022)  - Continuous TC since noon . -PRN nebs/suctioning  -ABCDEF protocol    CARDIOVASCULAR:     - Atrial fibrillation on eliquis  -Non-ischemic cardiomyopathy with EF 15-20%  -Recent PEA arrest (multiple)  -Pulmonary hypertension with right ventricular dysfunction  -Continue midodrine, florinef, mexiletine. PRN midodrine 10mg pre-HD  - holding entresto due to hypotension  -TTE ordered for preop work-up diverting colostomy next week. -Hold Eliquis for 24 hours before surgery. GASTROINTESTINAL     -Likely cyclic tube feeds as the patient is eating. RENAL/ELECTROLYTE/FLUIDS:     -Nephrology following. Tolerated iHD yesterday without pressor.  Patient delirious and pulled out HD cath on .  -Trend lytes and replete as needed  -Florinef, midodrine, EPO  -Suprapubic catheter care    ENDOCRINE: -Continue synthroid    HEMATOLOGY/ONCOLOGY:   - No signs of acute bleeding  - on eliquis    INFECTIOUS DISEASE:   S/p Vanc/Zosyn for 10 days. Wound cultures polymicrobial with light MRSA and MDR organisms. Respiratory cultures growing light Proteus mirabilis and achromobacter. Wound Vac placed today. Appreciate surgical consultation-likely diverting colostomy next week. Considering overall improving status including hemodynamics and leukocytosis, we will hold off on broadening antibiotics. Unclear source, likely HAP. ICU DAILY CHECKLIST     Code Status:Partial (No Shocks, No Compressions) Meds okay  DVT Prophylaxis:Eliquis  T/L/D: Ye, GJ tube, Colostomy, suprapubic catheter, PIV  SUP: None  Diet: Tube Feed,PO as tolerated  Activity Level:Paraplegia  ABCDEF Bundle/Checklist Completed:Yes  Disposition: Stay in ICU. Initiate discharge planning. Multidisciplinary Rounds Completed: Yes  Patient/Family Updated: Yes       HOSPITAL COURSE/DAILY EVENT LOG   8/ pan Cx started on Vanc/Zosyn  8/9 started n CRRT levophed    SUBJECTIVE   Review of Systems   Constitutional:  Negative for chills, diaphoresis and fever. HENT:  Negative for congestion, ear pain, hearing loss, nosebleeds, sinus pain and sore throat. Eyes:  Negative for blurred vision, double vision and pain. Respiratory:  Negative for cough, sputum production, shortness of breath and wheezing. Cardiovascular:  Negative for chest pain, palpitations and orthopnea. Gastrointestinal:  Negative for abdominal pain, constipation, heartburn, nausea and vomiting. Genitourinary:  Negative for hematuria. Musculoskeletal:  Negative for falls, joint pain and myalgias. Skin:  Negative for itching and rash. Neurological:  Negative for dizziness, tremors, speech change, focal weakness, weakness and headaches. Endo/Heme/Allergies:  Does not bruise/bleed easily. Psychiatric/Behavioral:  Negative for depression.        OBJECTIVE     Labs and Data: Reviewed 22  Medications: Reviewed 22  Imaging: Reviewed 22    Physical Exam  Vitals and nursing note reviewed. Constitutional:       General: He is awake. He is not in acute distress. Appearance: He is underweight. HENT:      Head: Normocephalic and atraumatic. Nose: Nose normal.      Mouth/Throat:      Comments: trach  Eyes:      General: Lids are normal.      Conjunctiva/sclera: Conjunctivae normal.      Pupils: Pupils are equal, round, and reactive to light. Neck:     Cardiovascular:      Rate and Rhythm: Normal rate. Rhythm irregularly irregular. Heart sounds: Normal heart sounds, S1 normal and S2 normal. No murmur heard. No friction rub. No gallop. Pulmonary:      Effort: Pulmonary effort is normal. No tachypnea. Breath sounds: Normal breath sounds and air entry. Comments: On vent  Abdominal:      General: The ostomy site is clean. Bowel sounds are normal.       Musculoskeletal:      Right lower leg: No edema. Left lower leg: No edema. Comments: Chronic numerous pressure ulcer wounds throughout, present on admission see wound care note for further details   Skin:     General: Skin is warm and dry. Capillary Refill: Capillary refill takes 2 to 3 seconds. Coloration: Skin is pale. Findings: Wound present. Neurological:      Mental Status: He is alert, oriented to person, place, and time and easily aroused. Mental status is at baseline. GCS: GCS eye subscore is 4. GCS verbal subscore is 5. GCS motor subscore is 6. Psychiatric:         Behavior: Behavior is cooperative.         Visit Vitals  /82 (BP 1 Location: Left arm, BP Patient Position: At rest)   Pulse (!) 107   Temp 98 °F (36.7 °C)   Resp 25   Ht 5' 8\" (1.727 m)   Wt 81.2 kg (179 lb 0.2 oz)   SpO2 95%   BMI 27.22 kg/m²    O2 Flow Rate (L/min): 10 l/min O2 Device: Tracheal collar Temp (24hrs), Av.5 °F (36.4 °C), Min:97 °F (36.1 °C), Max:98 °F (36.7 °C)           Intake/Output:     Intake/Output Summary (Last 24 hours) at 8/19/2022 1323  Last data filed at 8/19/2022 1200  Gross per 24 hour   Intake 1040 ml   Output 850 ml   Net 190 ml         Imaging       Pertinent imaging reviewed and interpreted independently as noted above    CRITICAL CARE DOCUMENTATION  I had a face to face encounter with the patient, reviewed and interpreted patient data including clinical events, labs, images, vital signs, I/O's, and examined patient. I have discussed the case and the plan and management of the patient's care with the consulting services, the bedside nurses and the respiratory therapist.      NOTE OF PERSONAL INVOLVEMENT IN CARE   This patient has a high probability of imminent, clinically significant deterioration, which requires the highest level of preparedness to intervene urgently. I participated in the decision-making and personally managed or directed the management of the following life and organ supporting interventions that required my frequent assessment to treat or prevent imminent deterioration. I personally spent 30 minutes of critical care time. This is time spent at this critically ill patient's bedside actively involved in patient care as well as the coordination of care. This does not include any procedural time which has been billed separately.     Ju Juarez MD  Staff [address]  8/19/2022

## 2022-08-20 ENCOUNTER — APPOINTMENT (OUTPATIENT)
Dept: GENERAL RADIOLOGY | Age: 42
DRG: 853 | End: 2022-08-20
Attending: INTERNAL MEDICINE
Payer: MEDICARE

## 2022-08-20 LAB
ALBUMIN SERPL-MCNC: 2.2 G/DL (ref 3.5–5)
ALBUMIN/GLOB SERPL: 0.5 {RATIO} (ref 1.1–2.2)
ALP SERPL-CCNC: 217 U/L (ref 45–117)
ALT SERPL-CCNC: 73 U/L (ref 12–78)
ANION GAP SERPL CALC-SCNC: 6 MMOL/L (ref 5–15)
AST SERPL-CCNC: 46 U/L (ref 15–37)
BASOPHILS # BLD: 0 K/UL (ref 0–0.1)
BASOPHILS NFR BLD: 0 % (ref 0–1)
BILIRUB SERPL-MCNC: 0.2 MG/DL (ref 0.2–1)
BUN SERPL-MCNC: 48 MG/DL (ref 6–20)
BUN/CREAT SERPL: 24 (ref 12–20)
CALCIUM SERPL-MCNC: 8.3 MG/DL (ref 8.5–10.1)
CHLORIDE SERPL-SCNC: 105 MMOL/L (ref 97–108)
CO2 SERPL-SCNC: 26 MMOL/L (ref 21–32)
CREAT SERPL-MCNC: 2.01 MG/DL (ref 0.7–1.3)
DIFFERENTIAL METHOD BLD: ABNORMAL
EOSINOPHIL # BLD: 0.1 K/UL (ref 0–0.4)
EOSINOPHIL NFR BLD: 1 % (ref 0–7)
ERYTHROCYTE [DISTWIDTH] IN BLOOD BY AUTOMATED COUNT: 15.9 % (ref 11.5–14.5)
GLOBULIN SER CALC-MCNC: 4.3 G/DL (ref 2–4)
GLUCOSE SERPL-MCNC: 177 MG/DL (ref 65–100)
HCT VFR BLD AUTO: 21.1 % (ref 36.6–50.3)
HGB BLD-MCNC: 6.8 G/DL (ref 12.1–17)
IMM GRANULOCYTES # BLD AUTO: 0.1 K/UL (ref 0–0.04)
IMM GRANULOCYTES NFR BLD AUTO: 1 % (ref 0–0.5)
LYMPHOCYTES # BLD: 0.8 K/UL (ref 0.8–3.5)
LYMPHOCYTES NFR BLD: 8 % (ref 12–49)
MAGNESIUM SERPL-MCNC: 2.4 MG/DL (ref 1.6–2.4)
MCH RBC QN AUTO: 28.7 PG (ref 26–34)
MCHC RBC AUTO-ENTMCNC: 32.2 G/DL (ref 30–36.5)
MCV RBC AUTO: 89 FL (ref 80–99)
MONOCYTES # BLD: 1.6 K/UL (ref 0–1)
MONOCYTES NFR BLD: 15 % (ref 5–13)
NEUTS SEG # BLD: 7.8 K/UL (ref 1.8–8)
NEUTS SEG NFR BLD: 75 % (ref 32–75)
NRBC # BLD: 0 K/UL (ref 0–0.01)
NRBC BLD-RTO: 0 PER 100 WBC
PLATELET # BLD AUTO: 323 K/UL (ref 150–400)
PMV BLD AUTO: 11.2 FL (ref 8.9–12.9)
POTASSIUM SERPL-SCNC: 3.2 MMOL/L (ref 3.5–5.1)
PROT SERPL-MCNC: 6.5 G/DL (ref 6.4–8.2)
RBC # BLD AUTO: 2.37 M/UL (ref 4.1–5.7)
RBC MORPH BLD: ABNORMAL
SODIUM SERPL-SCNC: 137 MMOL/L (ref 136–145)
WBC # BLD AUTO: 10.4 K/UL (ref 4.1–11.1)

## 2022-08-20 PROCEDURE — 74011250637 HC RX REV CODE- 250/637: Performed by: INTERNAL MEDICINE

## 2022-08-20 PROCEDURE — 74011636637 HC RX REV CODE- 636/637: Performed by: NURSE PRACTITIONER

## 2022-08-20 PROCEDURE — 94003 VENT MGMT INPAT SUBQ DAY: CPT

## 2022-08-20 PROCEDURE — 36415 COLL VENOUS BLD VENIPUNCTURE: CPT

## 2022-08-20 PROCEDURE — 74011000250 HC RX REV CODE- 250: Performed by: NURSE PRACTITIONER

## 2022-08-20 PROCEDURE — 74011250637 HC RX REV CODE- 250/637: Performed by: STUDENT IN AN ORGANIZED HEALTH CARE EDUCATION/TRAINING PROGRAM

## 2022-08-20 PROCEDURE — 80053 COMPREHEN METABOLIC PANEL: CPT

## 2022-08-20 PROCEDURE — 74011250637 HC RX REV CODE- 250/637: Performed by: NURSE PRACTITIONER

## 2022-08-20 PROCEDURE — 85025 COMPLETE CBC W/AUTO DIFF WBC: CPT

## 2022-08-20 PROCEDURE — 83735 ASSAY OF MAGNESIUM: CPT

## 2022-08-20 PROCEDURE — 71045 X-RAY EXAM CHEST 1 VIEW: CPT

## 2022-08-20 PROCEDURE — 74011000250 HC RX REV CODE- 250: Performed by: INTERNAL MEDICINE

## 2022-08-20 PROCEDURE — 65620000000 HC RM CCU GENERAL

## 2022-08-20 PROCEDURE — 74011250636 HC RX REV CODE- 250/636: Performed by: STUDENT IN AN ORGANIZED HEALTH CARE EDUCATION/TRAINING PROGRAM

## 2022-08-20 RX ADMIN — BUMETANIDE 1 MG: 0.25 INJECTION, SOLUTION INTRAMUSCULAR; INTRAVENOUS at 08:40

## 2022-08-20 RX ADMIN — Medication 5 UNITS: at 08:40

## 2022-08-20 RX ADMIN — CHOLESTYRAMINE 4 G: 4 POWDER, FOR SUSPENSION ORAL at 17:07

## 2022-08-20 RX ADMIN — MEXILETINE HYDROCHLORIDE 150 MG: 150 CAPSULE ORAL at 03:49

## 2022-08-20 RX ADMIN — FLUDROCORTISONE ACETATE 0.1 MG: 0.1 TABLET ORAL at 08:40

## 2022-08-20 RX ADMIN — MIDODRINE HYDROCHLORIDE 20 MG: 5 TABLET ORAL at 15:17

## 2022-08-20 RX ADMIN — LEVOTHYROXINE SODIUM 50 MCG: 0.05 TABLET ORAL at 05:48

## 2022-08-20 RX ADMIN — ATORVASTATIN CALCIUM 40 MG: 40 TABLET, FILM COATED ORAL at 20:58

## 2022-08-20 RX ADMIN — MINERAL SUPPLEMENT IRON 300 MG / 5 ML STRENGTH LIQUID 100 PER BOX UNFLAVORED 300 MG: at 08:39

## 2022-08-20 RX ADMIN — COLLAGENASE SANTYL: 250 OINTMENT TOPICAL at 08:41

## 2022-08-20 RX ADMIN — BUMETANIDE 1 MG: 0.25 INJECTION, SOLUTION INTRAMUSCULAR; INTRAVENOUS at 17:07

## 2022-08-20 RX ADMIN — HYDROCODONE BITARTRATE AND ACETAMINOPHEN 1 TABLET: 10; 325 TABLET ORAL at 20:57

## 2022-08-20 RX ADMIN — MIDODRINE HYDROCHLORIDE 20 MG: 5 TABLET ORAL at 20:56

## 2022-08-20 RX ADMIN — MIDODRINE HYDROCHLORIDE 20 MG: 5 TABLET ORAL at 22:00

## 2022-08-20 RX ADMIN — MIDODRINE HYDROCHLORIDE 20 MG: 5 TABLET ORAL at 05:48

## 2022-08-20 RX ADMIN — CHOLESTYRAMINE 4 G: 4 POWDER, FOR SUSPENSION ORAL at 11:27

## 2022-08-20 RX ADMIN — CHOLESTYRAMINE 4 G: 4 POWDER, FOR SUSPENSION ORAL at 08:40

## 2022-08-20 RX ADMIN — CHLORHEXIDINE GLUCONATE 15 ML: 1.2 RINSE ORAL at 20:59

## 2022-08-20 RX ADMIN — MINERAL SUPPLEMENT IRON 300 MG / 5 ML STRENGTH LIQUID 100 PER BOX UNFLAVORED 300 MG: at 11:27

## 2022-08-20 RX ADMIN — MEXILETINE HYDROCHLORIDE 150 MG: 150 CAPSULE ORAL at 15:17

## 2022-08-20 RX ADMIN — APIXABAN 5 MG: 5 TABLET, FILM COATED ORAL at 03:49

## 2022-08-20 RX ADMIN — Medication 10 ML: at 05:49

## 2022-08-20 RX ADMIN — Medication 10 ML: at 20:58

## 2022-08-20 RX ADMIN — CHLORHEXIDINE GLUCONATE 15 ML: 1.2 RINSE ORAL at 08:40

## 2022-08-20 RX ADMIN — MINERAL SUPPLEMENT IRON 300 MG / 5 ML STRENGTH LIQUID 100 PER BOX UNFLAVORED 300 MG: at 17:07

## 2022-08-20 RX ADMIN — APIXABAN 5 MG: 5 TABLET, FILM COATED ORAL at 15:17

## 2022-08-20 RX ADMIN — HYDROMORPHONE HYDROCHLORIDE 0.5 MG: 1 INJECTION, SOLUTION INTRAMUSCULAR; INTRAVENOUS; SUBCUTANEOUS at 11:44

## 2022-08-20 NOTE — PROGRESS NOTES
[address] - Report received from [facility], [address] [place] [address]. VSS, no c/o.  0300 - AM labs drawn & sent. Criticals relayed to MD, no new orders at this time. Bedside and Verbal shift change report given to Mei (oncoming nurse) by Hector Zamora (offgoing nurse). Report included the following information SBAR, Kardex, Intake/Output, MAR, Accordion, Recent Results, Cardiac Rhythm -NSR/ST, and Alarm Parameters .

## 2022-08-20 NOTE — PROGRESS NOTES
CRITICAL CARE NOTE      Name: Keri Payne   : 1980   MRN: [id number]   Date: 2022      REASON FOR ICU ADMISSION:  Acute renal failure, chronic vent dependence, septic shock     PRINCIPAL ICU DIAGNOSIS   Septic shock, unclear etiology  Acute renal injury  Chronic hypoxic respiratory failure, s/p trach, vent dependent  Chronic decubitus pressure injury ulcers, present on admission  HFrEF (20-25%)  Afib  Chronic hypotension  Paraplegia    BRIEF PATIENT SUMMARY   78-year-old male with known past medical history of multiple chronic issues as listed in problem list, who presented to [facility] ED after being sent from [facility] for possible need for CRRT for YADY due to inability to tolerate iHD due to sepsis/septic shock from unclear source. COMPREHENSIVE ASSESSMENT & PLAN:SYSTEM BASED     24 HOUR EVENTS:   Underwent therapeutic bronchoscopy for left lung collapse/atelectasis. Left on assist control settings overnight to help with lung recruitment. NEUROLOGICAL:   -Awake and alert in no acute distress  -history of paraplegia  - PT/OT SLP consulted  - Dysphagia with GJ tube in place, however cleared for PO by SLP    PULMONOLOGY:   -Chronic respiratory failure with trach in place ( placed 2022)  -On trach collar this morning. .  -PRN nebs/suctioning  -ABCDEF protocol    CARDIOVASCULAR:     - Atrial fibrillation on eliquis  -Non-ischemic cardiomyopathy with EF 15-20%  -Recent PEA arrest (multiple)  -Pulmonary hypertension with right ventricular dysfunction  -Continue midodrine, florinef, mexiletine. PRN midodrine 10mg pre-HD  - holding entresto due to hypotension  -TTE ordered for preop work-up diverting colostomy next week-shows LVEF of 15 to 20%. -Hold Eliquis for 24 hours before surgery. GASTROINTESTINAL     -Likely cyclic tube feeds as the patient is eating. RENAL/ELECTROLYTE/FLUIDS:     -Nephrology following. Tolerated iHD yesterday without pressor.  Patient delirious and pulled out HD cath on 8/18.  -Trend lytes and replete as needed  -Florinef, midodrine, EPO  -Suprapubic catheter care    ENDOCRINE:   -Continue synthroid    HEMATOLOGY/ONCOLOGY:   - No signs of acute bleeding  - on eliquis    INFECTIOUS DISEASE:   S/p Vanc/Zosyn for 10 days. Wound cultures polymicrobial with light MRSA and MDR organisms. Respiratory cultures growing light Proteus mirabilis and achromobacter. Wound Vac placed today. Appreciate surgical consultation-likely diverting colostomy next week. ICU DAILY CHECKLIST     Code Status:Partial (No Shocks, No Compressions) Meds okay  DVT Prophylaxis:Eliquis  T/L/D: Ye, GJ tube, Colostomy, suprapubic catheter, PIV  SUP: None  Diet: Tube Feed,PO as tolerated  Activity Level:Paraplegia  ABCDEF Bundle/Checklist Completed:Yes  Disposition: Stay in ICU. Initiate discharge planning. Multidisciplinary Rounds Completed: Yes  Patient/Family Updated: Yes       HOSPITAL COURSE/DAILY EVENT LOG   8/ pan Cx started on Vanc/Zosyn  8/9 started n CRRT levophed    SUBJECTIVE   Review of Systems   Constitutional:  Negative for chills, diaphoresis and fever. HENT:  Negative for congestion, ear pain, hearing loss, nosebleeds, sinus pain and sore throat. Eyes:  Negative for blurred vision, double vision and pain. Respiratory:  Negative for cough, sputum production, shortness of breath and wheezing. Cardiovascular:  Negative for chest pain, palpitations and orthopnea. Gastrointestinal:  Negative for abdominal pain, constipation, heartburn, nausea and vomiting. Genitourinary:  Negative for hematuria. Musculoskeletal:  Negative for falls, joint pain and myalgias. Skin:  Negative for itching and rash. Neurological:  Negative for dizziness, tremors, speech change, focal weakness, weakness and headaches. Endo/Heme/Allergies:  Does not bruise/bleed easily. Psychiatric/Behavioral:  Negative for depression.        OBJECTIVE     Labs and Data: Reviewed 08/20/22  Medications: Reviewed 22  Imaging: Reviewed 22    Physical Exam  Vitals and nursing note reviewed. Constitutional:       General: He is awake. He is not in acute distress. Appearance: He is underweight. HENT:      Head: Normocephalic and atraumatic. Nose: Nose normal.      Mouth/Throat:      Comments: trach  Eyes:      General: Lids are normal.      Conjunctiva/sclera: Conjunctivae normal.      Pupils: Pupils are equal, round, and reactive to light. Neck:     Cardiovascular:      Rate and Rhythm: Normal rate. Rhythm irregularly irregular. Heart sounds: Normal heart sounds, S1 normal and S2 normal. No murmur heard. No friction rub. No gallop. Pulmonary:      Effort: Pulmonary effort is normal. No tachypnea. Breath sounds: Normal breath sounds and air entry. Comments: On vent  Abdominal:      General: The ostomy site is clean. Bowel sounds are normal.       Musculoskeletal:      Right lower leg: No edema. Left lower leg: No edema. Comments: Chronic numerous pressure ulcer wounds throughout, present on admission see wound care note for further details   Skin:     General: Skin is warm and dry. Capillary Refill: Capillary refill takes 2 to 3 seconds. Coloration: Skin is pale. Findings: Wound present. Neurological:      Mental Status: He is alert, oriented to person, place, and time and easily aroused. Mental status is at baseline. GCS: GCS eye subscore is 4. GCS verbal subscore is 5. GCS motor subscore is 6. Psychiatric:         Behavior: Behavior is cooperative.         Visit Vitals  /85   Pulse (!) 118   Temp 99.1 °F (37.3 °C)   Resp 29   Ht 5' 8\" (1.727 m)   Wt 82.6 kg (182 lb 1.6 oz)   SpO2 99%   BMI 27.69 kg/m²    O2 Flow Rate (L/min): 6 l/min O2 Device: Tracheal collar Temp (24hrs), Av.2 °F (36.8 °C), Min:97.2 °F (36.2 °C), Max:99.1 °F (37.3 °C)           Intake/Output:     Intake/Output Summary (Last 24 hours) at 2022 Vitaliy filed at 8/20/2022 1200  Gross per 24 hour   Intake 1770 ml   Output 1500 ml   Net 270 ml         Imaging       Pertinent imaging reviewed and interpreted independently as noted above    CRITICAL CARE DOCUMENTATION  I had a face to face encounter with the patient, reviewed and interpreted patient data including clinical events, labs, images, vital signs, I/O's, and examined patient. I have discussed the case and the plan and management of the patient's care with the consulting services, the bedside nurses and the respiratory therapist.      NOTE OF PERSONAL INVOLVEMENT IN CARE   This patient has a high probability of imminent, clinically significant deterioration, which requires the highest level of preparedness to intervene urgently. I participated in the decision-making and personally managed or directed the management of the following life and organ supporting interventions that required my frequent assessment to treat or prevent imminent deterioration. I personally spent 30 minutes of critical care time. This is time spent at this critically ill patient's bedside actively involved in patient care as well as the coordination of care. This does not include any procedural time which has been billed separately.     Alexander Michael MD  Staff [address]  8/20/2022

## 2022-08-20 NOTE — PROGRESS NOTES
1930: Bedside and Verbal shift change report given to [address] (oncoming nurse) by Filiberto Collins RN (offgoing nurse). Report included the following information SBAR, Kardex, ED Summary, Intake/Output, MAR, Recent Results, Cardiac Rhythm SR/ST, and Alarm Parameters . 2330: Bedside and Verbal shift change report given to Candelaria DUTTA (oncoming nurse) by Venu Aguila RN (offgoing nurse). Report included the following information SBAR, Kardex, ED Summary, Intake/Output, MAR, Recent Results, Cardiac Rhythm SR, and Alarm Parameters .

## 2022-08-20 NOTE — PROGRESS NOTES
Name: Shruti Shaw MRN: [id number]   : 1980 Hospital: . [address]   Date: 2022        IMPRESSION:   YADY, started on iHD initially. patient became hypotensive and was switched to CRRT. --off CRRT on IHD, urine output increasing, Bp stable. Pullrd his Mariann Moise out today   Hypotension resolved, off iv pressor, on midodrine  Hypophosphatemia- resolved  Debility with multiple pressure ulcers  Respiratory failure - on vent, weaning  Anemia of chronic disease-   Hyperkalemia - resolved with RRT  Protein deficiency  wounds      PLAN:   S/p HD , no need for HD today (). Will reevaluate for HD tomorrow (). IV bumex 1 mg bid  C/w Midodrine 20 mg tid  Watch for GFR recovery --> hallmark will be increased urine output. Prior to starting dialysis patient's Scr was 0.8- UO increasing   Epo increased to 14K/week, po Iron  Tube feeding nepro  Dose meds for his GFR. Avoid NSAIDs + IV contrast.  Abx per ICU team     Subjective/Interval History:   I have reviewed the flowsheet and previous days notes. Seen on CRRT, awake and alert, denies pain/SOB with nodding head      F/U - YADY , CRRT --> 2022    Remains on CRRT + pressors. No new complaints were offered.  seen on CRRT, d/w ICU RN  8- CRRT stopped, BP stable on minimal Levophed, getting NeutraPhos  8/15 awake and alert, on vent. BP stable, had 175 ml UO yesterday, 150 ml today sofar. Cr 1.1. will add bumex if BP toleerates, no HD today  , awake on vent. UO has increased had 1 lit yesterday with bumex, cr increasing. BP stable, will hold HD today again   sleeping on vent, arousable, BP stable in 120s, had 1100 ml u/o and 500 overnight. Cr higher  , awake, off vent on trach collar, pulled his manjula this am, UO up       - F/U - YADY, Hx of HD --> 22    No new complaints. 22 - F/U - Yady , Hx of HD --> 22    No complaints.     Objective:   Vital Signs:    Visit Vitals  /85   Pulse (!) 118 Temp 99.1 °F (37.3 °C)   Resp 29   Ht 5' 8\" (1.727 m)   Wt 82.6 kg (182 lb 1.6 oz)   SpO2 99%   BMI 27.69 kg/m²       O2 Device: Tracheal collar   O2 Flow Rate (L/min): 6 l/min   Temp (24hrs), Av.2 °F (36.8 °C), Min:97.2 °F (36.2 °C), Max:99.1 °F (37.3 °C)       Intake/Output:   Last shift:       07 - 1900  In: 130 [I.V.:30]  Out: 75 [Urine:75]  Last 3 shifts: 1901 -  07  In: 4128 [I.V.:670]  Out: 3678 [Urine:1825]    Intake/Output Summary (Last 24 hours) at 2022 1239  Last data filed at 2022 1000  Gross per 24 hour   Intake 1810 ml   Output 1050 ml   Net 760 ml          Physical Exam:      Seen in CCU - Bed 25. General:    Sleeping comfortably. .    Head:   Normocephalic,  bitemporal muscle wasting. Eyes:   Conjunctivae/corneas clear. Neck:  Trach    Lungs:   no wheezes, no rales, no rhonchi. Heart:   Reg, No S 3 gallop, no pericardial rub  . Abdomen:   Not distended. PEG, suprapubic urinary cath    Extremities: Muscle wasting --> 2 + upper thigh                          Leg oedema -> mild     Skin:     Eschar - heel., Large sacral wound (not seen today)    Psych:  Calm    Neurologic: Arousable and responding to questions appropriately  . DATA:  Labs:  Recent Labs     22  0633    136 137   K 3.2* 3.6 3.6    104 104   CO2 26 26 27   BUN 48* 41* 28*   CREA 2.01* 1.73* 1.45*   CA 8.3* 8.4* 8.2*   ALB 2.2* 2.3* 2.4*   MG 2.4 2.5* 2.3       Recent Labs     22  03522  0633   WBC 10.4 9.5 8.6   HGB 6.8* 7.1* 7.2*   HCT 21.1* 22.1* 22.7*    274 231       No results for input(s): YAMIL, HILDA, CLU, CRETIM in the last 72 hours.     No lab exists for component: PROU    Total time spent with patient:           Care Plan discussed with:    Mr Isaura Mace MD

## 2022-08-21 ENCOUNTER — APPOINTMENT (OUTPATIENT)
Dept: GENERAL RADIOLOGY | Age: 42
DRG: 853 | End: 2022-08-21
Attending: INTERNAL MEDICINE
Payer: MEDICARE

## 2022-08-21 LAB
ALBUMIN SERPL-MCNC: 2 G/DL (ref 3.5–5)
ALBUMIN/GLOB SERPL: 0.4 {RATIO} (ref 1.1–2.2)
ALP SERPL-CCNC: 233 U/L (ref 45–117)
ALT SERPL-CCNC: 74 U/L (ref 12–78)
ANION GAP SERPL CALC-SCNC: 7 MMOL/L (ref 5–15)
AST SERPL-CCNC: 41 U/L (ref 15–37)
BASOPHILS # BLD: 0 K/UL (ref 0–0.1)
BASOPHILS NFR BLD: 0 % (ref 0–1)
BILIRUB SERPL-MCNC: 0.3 MG/DL (ref 0.2–1)
BUN SERPL-MCNC: 55 MG/DL (ref 6–20)
BUN/CREAT SERPL: 25 (ref 12–20)
CALCIUM SERPL-MCNC: 8.7 MG/DL (ref 8.5–10.1)
CHLORIDE SERPL-SCNC: 105 MMOL/L (ref 97–108)
CO2 SERPL-SCNC: 25 MMOL/L (ref 21–32)
CREAT SERPL-MCNC: 2.24 MG/DL (ref 0.7–1.3)
DIFFERENTIAL METHOD BLD: ABNORMAL
EOSINOPHIL # BLD: 0.1 K/UL (ref 0–0.4)
EOSINOPHIL NFR BLD: 1 % (ref 0–7)
ERYTHROCYTE [DISTWIDTH] IN BLOOD BY AUTOMATED COUNT: 16.2 % (ref 11.5–14.5)
GLOBULIN SER CALC-MCNC: 4.9 G/DL (ref 2–4)
GLUCOSE BLD STRIP.AUTO-MCNC: 135 MG/DL (ref 65–117)
GLUCOSE BLD STRIP.AUTO-MCNC: 219 MG/DL (ref 65–117)
GLUCOSE BLD STRIP.AUTO-MCNC: 227 MG/DL (ref 65–117)
GLUCOSE SERPL-MCNC: 227 MG/DL (ref 65–100)
HCT VFR BLD AUTO: 20.3 % (ref 36.6–50.3)
HGB BLD-MCNC: 6.6 G/DL (ref 12.1–17)
HISTORY CHECKED?,CKHIST: NORMAL
IMM GRANULOCYTES # BLD AUTO: 0 K/UL
IMM GRANULOCYTES NFR BLD AUTO: 0 %
LYMPHOCYTES # BLD: 0.9 K/UL (ref 0.8–3.5)
LYMPHOCYTES NFR BLD: 13 % (ref 12–49)
MAGNESIUM SERPL-MCNC: 2.4 MG/DL (ref 1.6–2.4)
MCH RBC QN AUTO: 28.7 PG (ref 26–34)
MCHC RBC AUTO-ENTMCNC: 32.5 G/DL (ref 30–36.5)
MCV RBC AUTO: 88.3 FL (ref 80–99)
MONOCYTES # BLD: 1.2 K/UL (ref 0–1)
MONOCYTES NFR BLD: 17 % (ref 5–13)
NEUTS SEG # BLD: 4.6 K/UL (ref 1.8–8)
NEUTS SEG NFR BLD: 69 % (ref 32–75)
NRBC # BLD: 0 K/UL (ref 0–0.01)
NRBC BLD-RTO: 0 PER 100 WBC
PLATELET # BLD AUTO: 340 K/UL (ref 150–400)
PMV BLD AUTO: 11.1 FL (ref 8.9–12.9)
POTASSIUM SERPL-SCNC: 2.9 MMOL/L (ref 3.5–5.1)
PROT SERPL-MCNC: 6.9 G/DL (ref 6.4–8.2)
RBC # BLD AUTO: 2.3 M/UL (ref 4.1–5.7)
RBC MORPH BLD: ABNORMAL
SERVICE CMNT-IMP: ABNORMAL
SODIUM SERPL-SCNC: 137 MMOL/L (ref 136–145)
WBC # BLD AUTO: 6.8 K/UL (ref 4.1–11.1)

## 2022-08-21 PROCEDURE — 86880 COOMBS TEST DIRECT: CPT

## 2022-08-21 PROCEDURE — 36415 COLL VENOUS BLD VENIPUNCTURE: CPT

## 2022-08-21 PROCEDURE — 83735 ASSAY OF MAGNESIUM: CPT

## 2022-08-21 PROCEDURE — 82962 GLUCOSE BLOOD TEST: CPT

## 2022-08-21 PROCEDURE — 74011636637 HC RX REV CODE- 636/637: Performed by: NURSE PRACTITIONER

## 2022-08-21 PROCEDURE — 74011250636 HC RX REV CODE- 250/636: Performed by: STUDENT IN AN ORGANIZED HEALTH CARE EDUCATION/TRAINING PROGRAM

## 2022-08-21 PROCEDURE — 94003 VENT MGMT INPAT SUBQ DAY: CPT

## 2022-08-21 PROCEDURE — 80053 COMPREHEN METABOLIC PANEL: CPT

## 2022-08-21 PROCEDURE — 86922 COMPATIBILITY TEST ANTIGLOB: CPT

## 2022-08-21 PROCEDURE — 86921 COMPATIBILITY TEST INCUBATE: CPT

## 2022-08-21 PROCEDURE — 74011250637 HC RX REV CODE- 250/637: Performed by: NURSE PRACTITIONER

## 2022-08-21 PROCEDURE — 36430 TRANSFUSION BLD/BLD COMPNT: CPT

## 2022-08-21 PROCEDURE — 86920 COMPATIBILITY TEST SPIN: CPT

## 2022-08-21 PROCEDURE — 2709999900 HC NON-CHARGEABLE SUPPLY

## 2022-08-21 PROCEDURE — 85025 COMPLETE CBC W/AUTO DIFF WBC: CPT

## 2022-08-21 PROCEDURE — 71045 X-RAY EXAM CHEST 1 VIEW: CPT

## 2022-08-21 PROCEDURE — 65620000000 HC RM CCU GENERAL

## 2022-08-21 PROCEDURE — 74011000250 HC RX REV CODE- 250: Performed by: INTERNAL MEDICINE

## 2022-08-21 PROCEDURE — 74011000250 HC RX REV CODE- 250: Performed by: STUDENT IN AN ORGANIZED HEALTH CARE EDUCATION/TRAINING PROGRAM

## 2022-08-21 PROCEDURE — 74011250637 HC RX REV CODE- 250/637: Performed by: INTERNAL MEDICINE

## 2022-08-21 PROCEDURE — 86900 BLOOD TYPING SEROLOGIC ABO: CPT

## 2022-08-21 PROCEDURE — 86902 BLOOD TYPE ANTIGEN DONOR EA: CPT

## 2022-08-21 PROCEDURE — P9016 RBC LEUKOCYTES REDUCED: HCPCS

## 2022-08-21 PROCEDURE — 86870 RBC ANTIBODY IDENTIFICATION: CPT

## 2022-08-21 PROCEDURE — 74011000250 HC RX REV CODE- 250: Performed by: NURSE PRACTITIONER

## 2022-08-21 RX ADMIN — POTASSIUM BICARBONATE 50 MEQ: 782 TABLET, EFFERVESCENT ORAL at 08:26

## 2022-08-21 RX ADMIN — CHOLESTYRAMINE 4 G: 4 POWDER, FOR SUSPENSION ORAL at 12:13

## 2022-08-21 RX ADMIN — APIXABAN 5 MG: 5 TABLET, FILM COATED ORAL at 04:14

## 2022-08-21 RX ADMIN — MINERAL SUPPLEMENT IRON 300 MG / 5 ML STRENGTH LIQUID 100 PER BOX UNFLAVORED 300 MG: at 08:28

## 2022-08-21 RX ADMIN — MEXILETINE HYDROCHLORIDE 150 MG: 150 CAPSULE ORAL at 04:14

## 2022-08-21 RX ADMIN — BUMETANIDE 1 MG: 0.25 INJECTION, SOLUTION INTRAMUSCULAR; INTRAVENOUS at 17:59

## 2022-08-21 RX ADMIN — MEXILETINE HYDROCHLORIDE 150 MG: 150 CAPSULE ORAL at 16:43

## 2022-08-21 RX ADMIN — ATORVASTATIN CALCIUM 40 MG: 40 TABLET, FILM COATED ORAL at 22:58

## 2022-08-21 RX ADMIN — MIDODRINE HYDROCHLORIDE 20 MG: 5 TABLET ORAL at 06:23

## 2022-08-21 RX ADMIN — Medication 5 UNITS: at 08:26

## 2022-08-21 RX ADMIN — MIDODRINE HYDROCHLORIDE 20 MG: 5 TABLET ORAL at 14:14

## 2022-08-21 RX ADMIN — MINERAL SUPPLEMENT IRON 300 MG / 5 ML STRENGTH LIQUID 100 PER BOX UNFLAVORED 300 MG: at 16:43

## 2022-08-21 RX ADMIN — Medication 10 ML: at 22:57

## 2022-08-21 RX ADMIN — HYDROMORPHONE HYDROCHLORIDE 0.5 MG: 1 INJECTION, SOLUTION INTRAMUSCULAR; INTRAVENOUS; SUBCUTANEOUS at 14:43

## 2022-08-21 RX ADMIN — Medication 10 ML: at 06:24

## 2022-08-21 RX ADMIN — MINERAL SUPPLEMENT IRON 300 MG / 5 ML STRENGTH LIQUID 100 PER BOX UNFLAVORED 300 MG: at 12:13

## 2022-08-21 RX ADMIN — ALTEPLASE 1 MG: 2.2 INJECTION, POWDER, LYOPHILIZED, FOR SOLUTION INTRAVENOUS at 20:38

## 2022-08-21 RX ADMIN — LEVOTHYROXINE SODIUM 50 MCG: 0.05 TABLET ORAL at 06:23

## 2022-08-21 RX ADMIN — FLUDROCORTISONE ACETATE 0.1 MG: 0.1 TABLET ORAL at 08:29

## 2022-08-21 RX ADMIN — MIDODRINE HYDROCHLORIDE 20 MG: 5 TABLET ORAL at 22:58

## 2022-08-21 RX ADMIN — CHOLESTYRAMINE 4 G: 4 POWDER, FOR SUSPENSION ORAL at 16:43

## 2022-08-21 RX ADMIN — CHOLESTYRAMINE 4 G: 4 POWDER, FOR SUSPENSION ORAL at 08:28

## 2022-08-21 RX ADMIN — Medication 10 ML: at 14:24

## 2022-08-21 RX ADMIN — APIXABAN 5 MG: 5 TABLET, FILM COATED ORAL at 14:14

## 2022-08-21 RX ADMIN — CHLORHEXIDINE GLUCONATE 15 ML: 1.2 RINSE ORAL at 20:38

## 2022-08-21 RX ADMIN — BUMETANIDE 1 MG: 0.25 INJECTION, SOLUTION INTRAMUSCULAR; INTRAVENOUS at 08:33

## 2022-08-21 NOTE — ROUTINE PROCESS
0730- Report obtained from [facility]. 1030- Type and crossmatch sent. 1408- Unit of blood started. 1443- Dilaudid given prior to wound care. 1500- Wound care and trach care performed.      1930- Report given to [facility] RN

## 2022-08-21 NOTE — PROGRESS NOTES
Pt continues to be in fair condition. Pt placed back on vent during the night secondary to respiratory distress. Pt continues with significant wounds that are cavernous, foul smelling, and not healing well at this time. Pt requires intense wound care therapy and remains malnourished. Pt also has a history of pulling lines essential to care.

## 2022-08-21 NOTE — PROGRESS NOTES
Name: Adelso Nam MRN: [id number]   : 1980 Hospital: [facility]   Date: 2022        IMPRESSION:   YADY, started on iHD initially. patient became hypotensive and was switched to CRRT. --off CRRT on IHD, urine output increasing, Bp stable. Pullrd his Genevie Pae out today   Hypotension resolved, off iv pressor, on midodrine  Hypophosphatemia- resolved  Debility with multiple pressure ulcers  Respiratory failure - on vent, weaning  Anemia of chronic disease-   Hyperkalemia - resolved with RRT  Protein deficiency  Wounds  Hypokalemia       PLAN:   S/p HD , no need for HD today (). Will reevaluate for HD tomorrow (). Azotemia has worsened. There was no suggestion of fluid overload today. IV bumex 1 mg bid  Replete K. C/w Midodrine 20 mg tid  Watch for GFR recovery --> hallmark will be increased urine output. Prior to starting dialysis patient's Scr was 0.8- UO increasing   Epo increased to 14K/week, po Iron  Tube feeding nepro  Dose meds for his GFR. Avoid NSAIDs + IV contrast.  Abx per ICU team     Subjective/Interval History:   I have reviewed the flowsheet and previous days notes. Seen on CRRT, awake and alert, denies pain/SOB with nodding head      F/U - YADY , CRRT --> 2022    Remains on CRRT + pressors. No new complaints were offered.  seen on CRRT, d/w ICU RN  8-14 CRRT stopped, BP stable on minimal Levophed, getting NeutraPhos  8/15 awake and alert, on vent. BP stable, had 175 ml UO yesterday, 150 ml today sofar. Cr 1.1. will add bumex if BP toleerates, no HD today  , awake on vent. UO has increased had 1 lit yesterday with bumex, cr increasing. BP stable, will hold HD today again   sleeping on vent, arousable, BP stable in 120s, had 1100 ml u/o and 500 overnight. Cr higher  , awake, off vent on trach collar, pulled his manjula this am, UO up       - F/U - YADY, Hx of HD --> 22    No new complaints.     22 - F/U - Yady , Hx of HD --> 22    No complaints. 22 - F/U YADY , Hx of HD    Unable to obtain the ROS. Objective:   Vital Signs:    Visit Vitals  /71 (BP 1 Location: Left arm, BP Patient Position: At rest)   Pulse 98   Temp 98.2 °F (36.8 °C)   Resp 17   Ht 5' 8\" (1.727 m)   Wt 62.7 kg (138 lb 3.7 oz) Comment: Scale Zeroed on the bed   SpO2 100%   BMI 21.02 kg/m²       O2 Device: Tracheostomy   O2 Flow Rate (L/min): 10 l/min   Temp (24hrs), Av.8 °F (36.6 °C), Min:96.7 °F (35.9 °C), Max:98.8 °F (37.1 °C)       Intake/Output:   Last shift:       07 -  190  In: 410 [P.O.:100; I.V.:40]  Out: 500 [Urine:450; Drains:50]  Last 3 shifts: 1901 -  0700  In: 3115 [I.V.:790]  Out: 2375 [Urine:2275; Drains:100]    Intake/Output Summary (Last 24 hours) at 2022 1300  Last data filed at 2022 1200  Gross per 24 hour   Intake 1705 ml   Output 1575 ml   Net 130 ml          Physical Exam:      Seen in CCU - Bed 25. General:    Sleeping comfortably. .                          On vent support    Head:   Normocephalic,  bitemporal muscle wasting. Eyes:   Conjunctivae/corneas clear. Neck:  Trach    Lungs:   no wheezes, no rales, no rhonchi. Heart:   Reg, No S 3 gallop, no pericardial rub  . Abdomen:   Not distended.                             PEG, suprapubic urinary cath    Extremities: Muscle wasting --> 2 + upper thigh                          Leg oedema -> mild     Skin:     Eschar - heel., Large sacral wound (not seen today)    Psych:  Calm    Neurologic: Sleeping    DATA:  Labs:  Recent Labs     22  0418 22  0350 22  0445    137 136   K 2.9* 3.2* 3.6    105 104   CO2 25 26 26   BUN 55* 48* 41*   CREA 2.24* 2.01* 1.73*   CA 8.7 8.3* 8.4*   ALB 2.0* 2.2* 2.3*   MG 2.4 2.4 2.5*       Recent Labs     22  0418 22  0350 22  0445   WBC 6.8 10.4 9.5   HGB 6.6* 6.8* 7.1*   HCT 20.3* 21.1* 22.1*    323 274       No results for input(s): YAMIL, HILDA, CHELSEA CALLOWAY in the last 72 hours.     No lab exists for component: PROU    Total time spent with patient:           Care Plan discussed with:        Wes Acosta MD

## 2022-08-21 NOTE — PROGRESS NOTES
CRITICAL CARE NOTE      Name: Wilfrido Hammer   : 1980   MRN: [id number]   Date: 2022      REASON FOR ICU ADMISSION:  Acute renal failure, chronic vent dependence, septic shock     PRINCIPAL ICU DIAGNOSIS   Septic shock, unclear etiology  Acute renal injury  Chronic hypoxic respiratory failure, s/p trach, vent dependent  Chronic decubitus pressure injury ulcers, present on admission  HFrEF (20-25%)  Afib  Chronic hypotension  Paraplegia    BRIEF PATIENT SUMMARY   78-year-old male with known past medical history of multiple chronic issues as listed in problem list, who presented to [facility] ED after being sent from [facility] for possible need for CRRT for YADY due to inability to tolerate iHD due to sepsis/septic shock from unclear source. COMPREHENSIVE ASSESSMENT & PLAN:SYSTEM BASED     24 HOUR EVENTS:   Back on the vent overnight due to increased work of breathing. NEUROLOGICAL:   -Awake and alert in no acute distress  -history of paraplegia  - PT/OT SLP consulted  - Dysphagia with GJ tube in place, however cleared for PO by SLP    PULMONOLOGY:   -Chronic respiratory failure with trach in place ( placed 2022)  -Vent wean as tolerated. -PRN nebs/suctioning  -ABCDEF protocol    CARDIOVASCULAR:     - Atrial fibrillation on eliquis  -Non-ischemic cardiomyopathy with EF 15-20%  -Recent PEA arrest (multiple)  -Pulmonary hypertension with right ventricular dysfunction  -Continue midodrine, florinef, mexiletine. PRN midodrine 10mg pre-HD  - holding entresto due to hypotension  -TTE ordered for preop work-up diverting colostomy next week-shows LVEF of 15 to 20%. -Hold Eliquis for 24 hours before surgery. GASTROINTESTINAL     -Likely cyclic tube feeds as the patient is eating. RENAL/ELECTROLYTE/FLUIDS:     -Last dialysis on . No acute needs for renal placement therapy.   Bumex challenge in progress.  -Trend lytes and replete as needed  -Florinef, midodrine, EPO  -Suprapubic catheter care    ENDOCRINE:   -Continue synthroid    HEMATOLOGY/ONCOLOGY:   -We will give 1 unit of PRBC for hemoglobin of 6.6. Gradually downtrending.  - on eliquis    INFECTIOUS DISEASE:   S/p Vanc/Zosyn for 10 days. Wound cultures polymicrobial with light MRSA and MDR organisms. Respiratory cultures growing light Proteus mirabilis and achromobacter. Wound Vac placed today. Appreciate surgical consultation-likely diverting colostomy next week. ICU DAILY CHECKLIST     Code Status:Partial (No Shocks, No Compressions) Meds okay  DVT Prophylaxis:Eliquis  T/L/D: Ye, GJ tube, Colostomy, suprapubic catheter, PIV  SUP: None  Diet: Tube Feed,PO as tolerated  Activity Level:Paraplegia  ABCDEF Bundle/Checklist Completed:Yes  Disposition: Stay in ICU. Initiate discharge planning. Multidisciplinary Rounds Completed: Yes  Patient/Family Updated: Yes       HOSPITAL COURSE/DAILY EVENT LOG   8/ pan Cx started on Vanc/Zosyn  8/9 started n CRRT levophed    SUBJECTIVE   Review of Systems   Constitutional:  Negative for chills, diaphoresis and fever. HENT:  Negative for congestion, ear pain, hearing loss, nosebleeds, sinus pain and sore throat. Eyes:  Negative for blurred vision, double vision and pain. Respiratory:  Negative for cough, sputum production, shortness of breath and wheezing. Cardiovascular:  Negative for chest pain, palpitations and orthopnea. Gastrointestinal:  Negative for abdominal pain, constipation, heartburn, nausea and vomiting. Genitourinary:  Negative for hematuria. Musculoskeletal:  Negative for falls, joint pain and myalgias. Skin:  Negative for itching and rash. Neurological:  Negative for dizziness, tremors, speech change, focal weakness, weakness and headaches. Endo/Heme/Allergies:  Does not bruise/bleed easily. Psychiatric/Behavioral:  Negative for depression.        OBJECTIVE     Labs and Data: Reviewed 08/21/22  Medications: Reviewed 08/21/22  Imaging: Reviewed 22    Physical Exam  Vitals and nursing note reviewed. Constitutional:       General: He is awake. He is not in acute distress. Appearance: He is underweight. HENT:      Head: Normocephalic and atraumatic. Nose: Nose normal.      Mouth/Throat:      Comments: trach  Eyes:      General: Lids are normal.      Conjunctiva/sclera: Conjunctivae normal.      Pupils: Pupils are equal, round, and reactive to light. Neck:     Cardiovascular:      Rate and Rhythm: Normal rate. Rhythm irregularly irregular. Heart sounds: Normal heart sounds, S1 normal and S2 normal. No murmur heard. No friction rub. No gallop. Pulmonary:      Effort: Pulmonary effort is normal. No tachypnea. Breath sounds: Normal breath sounds and air entry. Comments: On vent  Abdominal:      General: The ostomy site is clean. Bowel sounds are normal.       Musculoskeletal:      Right lower leg: No edema. Left lower leg: No edema. Comments: Chronic numerous pressure ulcer wounds throughout, present on admission see wound care note for further details   Skin:     General: Skin is warm and dry. Capillary Refill: Capillary refill takes 2 to 3 seconds. Coloration: Skin is pale. Findings: Wound present. Neurological:      Mental Status: He is alert, oriented to person, place, and time and easily aroused. Mental status is at baseline. GCS: GCS eye subscore is 4. GCS verbal subscore is 5. GCS motor subscore is 6. Psychiatric:         Behavior: Behavior is cooperative.         Visit Vitals  /88   Pulse 97   Temp 97.8 °F (36.6 °C)   Resp 16   Ht 5' 8\" (1.727 m)   Wt 82.6 kg (182 lb 1.6 oz)   SpO2 100%   BMI 27.69 kg/m²    O2 Flow Rate (L/min): 10 l/min O2 Device: Tracheostomy, Ventilator Temp (24hrs), Av.8 °F (36.6 °C), Min:96.7 °F (35.9 °C), Max:98.8 °F (37.1 °C)           Intake/Output:     Intake/Output Summary (Last 24 hours) at 2022 4523  Last data filed at 2022 0600  Gross per 24 hour   Intake 1435 ml   Output 1525 ml   Net -90 ml         Imaging       Pertinent imaging reviewed and interpreted independently as noted above    CRITICAL CARE DOCUMENTATION  I had a face to face encounter with the patient, reviewed and interpreted patient data including clinical events, labs, images, vital signs, I/O's, and examined patient. I have discussed the case and the plan and management of the patient's care with the consulting services, the bedside nurses and the respiratory therapist.      NOTE OF PERSONAL INVOLVEMENT IN CARE   This patient has a high probability of imminent, clinically significant deterioration, which requires the highest level of preparedness to intervene urgently. I participated in the decision-making and personally managed or directed the management of the following life and organ supporting interventions that required my frequent assessment to treat or prevent imminent deterioration. I personally spent 30 minutes of critical care time. This is time spent at this critically ill patient's bedside actively involved in patient care as well as the coordination of care. This does not include any procedural time which has been billed separately.     Pavan Shelton MD  Staff [address] [facility]  8/21/2022

## 2022-08-21 NOTE — PROGRESS NOTES
1945: Report received from TRA Rae    2030: In to complete assessment and respond to pulse ox not reading on monitor. Noted patient had pulled off pulse ox and trach collar. Redirected pt. Noted tachypnea and some respiratory distress. Repositioned pt after reapplying oxygen. O2 sats at 85%. Increased oxygen and flow. Pt's O2 sats eventually normalized to 96%. 2100: Noted pt's heart rate drop to 50's. O2 sats also 56%. In to evaluate pt. Noted increased distress, retracting. Increased trach collar to 10L at 70%. Call placed to respiratory therapist to place pt back on ventilator.

## 2022-08-21 NOTE — PROGRESS NOTES
Occupational Therapy  2022    Chart reviewed. Noted pt now on vent via trach and in B wrist restraints due to pulling at lines and leads. Pt very drowsy upon OT arrival, B soft wrist restraints in place. Pt is likely close to his ADL baseline, given that his bed bound, hx of paraplegia and now with multiple stage 4/unstageable pressure ulcers with wound vac placed. Pt requires A for all ADLs, including self feeding 2* respiratory status on trach/vent. Will sign off at this time as pt is likely at baseline for ADLs.  Yetta Apgar, MS, OTR/L

## 2022-08-22 ENCOUNTER — APPOINTMENT (OUTPATIENT)
Dept: GENERAL RADIOLOGY | Age: 42
DRG: 853 | End: 2022-08-22
Attending: INTERNAL MEDICINE
Payer: MEDICARE

## 2022-08-22 LAB
ABO + RH BLD: NORMAL
ALBUMIN SERPL-MCNC: 2 G/DL (ref 3.5–5)
ANION GAP SERPL CALC-SCNC: 6 MMOL/L (ref 5–15)
ANTI-COMPLEMENT (C3B,C3D): NORMAL
ANTIGENS PRESENT RBC DONR: NORMAL
BASOPHILS # BLD: 0 K/UL (ref 0–0.1)
BASOPHILS NFR BLD: 0 % (ref 0–1)
BLASTS NFR BLD MANUAL: 0 %
BLD PROD TYP BPU: NORMAL
BLD PROD TYP BPU: NORMAL
BLOOD BANK CMNT PATIENT-IMP: NORMAL
BLOOD GROUP ANTIBODIES SERPL: NORMAL
BLOOD GROUP ANTIBODIES SERPL: NORMAL
BPU ID: NORMAL
BPU ID: NORMAL
BUN SERPL-MCNC: 59 MG/DL (ref 6–20)
BUN/CREAT SERPL: 26 (ref 12–20)
CALCIUM SERPL-MCNC: 8.5 MG/DL (ref 8.5–10.1)
CHLORIDE SERPL-SCNC: 104 MMOL/L (ref 97–108)
CO2 SERPL-SCNC: 27 MMOL/L (ref 21–32)
CREAT SERPL-MCNC: 2.23 MG/DL (ref 0.7–1.3)
CROSSMATCH RESULT,%XM: NORMAL
CROSSMATCH RESULT,%XM: NORMAL
DAT IGG-SP REAG RBC QL: NORMAL
DAT POLY-SP REAG RBC QL: NORMAL
DIFFERENTIAL METHOD BLD: ABNORMAL
EOSINOPHIL # BLD: 0.1 K/UL (ref 0–0.4)
EOSINOPHIL NFR BLD: 2 % (ref 0–7)
ERYTHROCYTE [DISTWIDTH] IN BLOOD BY AUTOMATED COUNT: 16 % (ref 11.5–14.5)
GLUCOSE SERPL-MCNC: 174 MG/DL (ref 65–100)
HCT VFR BLD AUTO: 25.1 % (ref 36.6–50.3)
HGB BLD-MCNC: 8.2 G/DL (ref 12.1–17)
IMM GRANULOCYTES # BLD AUTO: 0 K/UL
IMM GRANULOCYTES NFR BLD AUTO: 0 %
LYMPHOCYTES # BLD: 2 K/UL (ref 0.8–3.5)
LYMPHOCYTES NFR BLD: 30 % (ref 12–49)
MCH RBC QN AUTO: 28.4 PG (ref 26–34)
MCHC RBC AUTO-ENTMCNC: 32.7 G/DL (ref 30–36.5)
MCV RBC AUTO: 86.9 FL (ref 80–99)
METAMYELOCYTES NFR BLD MANUAL: 2 %
MONOCYTES # BLD: 0.7 K/UL (ref 0–1)
MONOCYTES NFR BLD: 10 % (ref 5–13)
MYELOCYTES NFR BLD MANUAL: 0 %
NEUTS BAND NFR BLD MANUAL: 6 % (ref 0–6)
NEUTS SEG # BLD: 3.8 K/UL (ref 1.8–8)
NEUTS SEG NFR BLD: 50 % (ref 32–75)
NRBC # BLD: 0 K/UL (ref 0–0.01)
NRBC BLD-RTO: 0 PER 100 WBC
OTHER CELLS NFR BLD MANUAL: 0 %
PHOSPHATE SERPL-MCNC: 2.4 MG/DL (ref 2.6–4.7)
PLATELET # BLD AUTO: 338 K/UL (ref 150–400)
PMV BLD AUTO: 10.5 FL (ref 8.9–12.9)
POTASSIUM SERPL-SCNC: 2.9 MMOL/L (ref 3.5–5.1)
PROMYELOCYTES NFR BLD MANUAL: 0 %
RBC # BLD AUTO: 2.89 M/UL (ref 4.1–5.7)
RBC MORPH BLD: ABNORMAL
SODIUM SERPL-SCNC: 137 MMOL/L (ref 136–145)
SPECIMEN EXP DATE BLD: NORMAL
STATUS OF UNIT,%ST: NORMAL
STATUS OF UNIT,%ST: NORMAL
TOTAL CELLS COUNTED SPEC: 50
UNIT DIVISION, %UDIV: 0
UNIT DIVISION, %UDIV: 0
WBC # BLD AUTO: 6.7 K/UL (ref 4.1–11.1)

## 2022-08-22 PROCEDURE — 71045 X-RAY EXAM CHEST 1 VIEW: CPT

## 2022-08-22 PROCEDURE — 74011250637 HC RX REV CODE- 250/637: Performed by: INTERNAL MEDICINE

## 2022-08-22 PROCEDURE — 74011250637 HC RX REV CODE- 250/637: Performed by: NURSE PRACTITIONER

## 2022-08-22 PROCEDURE — 99231 SBSQ HOSP IP/OBS SF/LOW 25: CPT | Performed by: SURGERY

## 2022-08-22 PROCEDURE — 85027 COMPLETE CBC AUTOMATED: CPT

## 2022-08-22 PROCEDURE — 74011636637 HC RX REV CODE- 636/637: Performed by: NURSE PRACTITIONER

## 2022-08-22 PROCEDURE — 92507 TX SP LANG VOICE COMM INDIV: CPT | Performed by: SPEECH-LANGUAGE PATHOLOGIST

## 2022-08-22 PROCEDURE — 92526 ORAL FUNCTION THERAPY: CPT | Performed by: SPEECH-LANGUAGE PATHOLOGIST

## 2022-08-22 PROCEDURE — 36415 COLL VENOUS BLD VENIPUNCTURE: CPT

## 2022-08-22 PROCEDURE — 65620000000 HC RM CCU GENERAL

## 2022-08-22 PROCEDURE — 74011000250 HC RX REV CODE- 250: Performed by: SURGERY

## 2022-08-22 PROCEDURE — 74011000250 HC RX REV CODE- 250: Performed by: INTERNAL MEDICINE

## 2022-08-22 PROCEDURE — 80069 RENAL FUNCTION PANEL: CPT

## 2022-08-22 PROCEDURE — 74011000250 HC RX REV CODE- 250: Performed by: NURSE PRACTITIONER

## 2022-08-22 PROCEDURE — 74011250637 HC RX REV CODE- 250/637: Performed by: STUDENT IN AN ORGANIZED HEALTH CARE EDUCATION/TRAINING PROGRAM

## 2022-08-22 RX ADMIN — POTASSIUM BICARBONATE 20 MEQ: 782 TABLET, EFFERVESCENT ORAL at 09:35

## 2022-08-22 RX ADMIN — Medication: at 13:20

## 2022-08-22 RX ADMIN — POTASSIUM BICARBONATE 50 MEQ: 782 TABLET, EFFERVESCENT ORAL at 04:34

## 2022-08-22 RX ADMIN — ATORVASTATIN CALCIUM 40 MG: 40 TABLET, FILM COATED ORAL at 21:18

## 2022-08-22 RX ADMIN — LEVOTHYROXINE SODIUM 50 MCG: 0.05 TABLET ORAL at 05:59

## 2022-08-22 RX ADMIN — MEXILETINE HYDROCHLORIDE 150 MG: 150 CAPSULE ORAL at 15:42

## 2022-08-22 RX ADMIN — Medication: at 09:34

## 2022-08-22 RX ADMIN — CHOLESTYRAMINE 4 G: 4 POWDER, FOR SUSPENSION ORAL at 18:32

## 2022-08-22 RX ADMIN — CHLORHEXIDINE GLUCONATE 15 ML: 1.2 RINSE ORAL at 13:19

## 2022-08-22 RX ADMIN — Medication 5 UNITS: at 09:35

## 2022-08-22 RX ADMIN — BUMETANIDE 1 MG: 0.25 INJECTION, SOLUTION INTRAMUSCULAR; INTRAVENOUS at 09:35

## 2022-08-22 RX ADMIN — MINERAL SUPPLEMENT IRON 300 MG / 5 ML STRENGTH LIQUID 100 PER BOX UNFLAVORED 300 MG: at 09:35

## 2022-08-22 RX ADMIN — Medication 10 ML: at 13:27

## 2022-08-22 RX ADMIN — CHOLESTYRAMINE 4 G: 4 POWDER, FOR SUSPENSION ORAL at 13:19

## 2022-08-22 RX ADMIN — POTASSIUM BICARBONATE 20 MEQ: 782 TABLET, EFFERVESCENT ORAL at 21:18

## 2022-08-22 RX ADMIN — APIXABAN 5 MG: 5 TABLET, FILM COATED ORAL at 04:34

## 2022-08-22 RX ADMIN — BUMETANIDE 1 MG: 0.25 INJECTION, SOLUTION INTRAMUSCULAR; INTRAVENOUS at 18:32

## 2022-08-22 RX ADMIN — COLLAGENASE SANTYL: 250 OINTMENT TOPICAL at 09:00

## 2022-08-22 RX ADMIN — POTASSIUM BICARBONATE 20 MEQ: 782 TABLET, EFFERVESCENT ORAL at 13:19

## 2022-08-22 RX ADMIN — Medication 10 ML: at 21:19

## 2022-08-22 RX ADMIN — Medication 10 ML: at 06:01

## 2022-08-22 RX ADMIN — CHLORHEXIDINE GLUCONATE 15 ML: 1.2 RINSE ORAL at 21:19

## 2022-08-22 RX ADMIN — MIDODRINE HYDROCHLORIDE 20 MG: 5 TABLET ORAL at 21:18

## 2022-08-22 RX ADMIN — MEXILETINE HYDROCHLORIDE 150 MG: 150 CAPSULE ORAL at 04:34

## 2022-08-22 RX ADMIN — MIDODRINE HYDROCHLORIDE 20 MG: 5 TABLET ORAL at 06:00

## 2022-08-22 RX ADMIN — FLUDROCORTISONE ACETATE 0.1 MG: 0.1 TABLET ORAL at 09:35

## 2022-08-22 RX ADMIN — MINERAL SUPPLEMENT IRON 300 MG / 5 ML STRENGTH LIQUID 100 PER BOX UNFLAVORED 300 MG: at 13:18

## 2022-08-22 RX ADMIN — HYDROCODONE BITARTRATE AND ACETAMINOPHEN 1 TABLET: 10; 325 TABLET ORAL at 01:41

## 2022-08-22 RX ADMIN — MINERAL SUPPLEMENT IRON 300 MG / 5 ML STRENGTH LIQUID 100 PER BOX UNFLAVORED 300 MG: at 18:32

## 2022-08-22 NOTE — PROGRESS NOTES
Name: Jose Hagen MRN: [id number]   : 1980 [facility]: [facility]   Date: 2022        IMPRESSION:   YADY, started on iHD initially. patient became hypotensive and was switched to CRRT. --off CRRT on IHD, urine output increasing, Bp stable. Pullrd his [facility] out today   Hypotension resolved, off iv pressor, on midodrine  Hypophosphatemia- resolved  Debility with multiple pressure ulcers  Respiratory failure - on vent, weaning  Anemia of chronic disease-   Hyperkalemia - resolved with RRT  Protein deficiency  Wounds  Hypokalemia       PLAN:   S/p HD , no need for HD today (). Creatinine is stable at 2.23. Will reevaluate for HD tomorrow (). IV bumex 1 mg bid  Follow lytes. K supplements today. Midodrine 20 mg tid  Watch for GFR recovery --> hallmark will be increased urine output. Prior to starting dialysis patient's Scr was 0.8- UO increasing   Epo increased to 14K/week, po Iron  Tube feeding nepro  Dose meds for his GFR. Avoid NSAIDs + IV contrast.  Abx per ICU team     Subjective/Interval History:   I have reviewed the flowsheet and previous days notes. Seen on CRRT, awake and alert, denies pain/SOB with nodding head      F/U - YADY , CRRT --> 2022    Remains on CRRT + pressors. No new complaints were offered.  seen on CRRT, d/w ICU RN  8- CRRT stopped, BP stable on minimal Levophed, getting NeutraPhos  8/15 awake and alert, on vent. BP stable, had 175 ml UO yesterday, 150 ml today sofar. Cr 1.1. will add bumex if BP toleerates, no HD today  , awake on vent. UO has increased had 1 lit yesterday with bumex, cr increasing. BP stable, will hold HD today again   sleeping on vent, arousable, BP stable in 120s, had 1100 ml u/o and 500 overnight. Cr higher  , awake, off vent on trach collar, pulled his manjula this am, UO up       - F/U - YADY, Hx of HD --> 22    No new complaints.     22 - F/U - Yady , Hx of HD --> 22    No complaints. 22 - F/U YADY , Hx of HD    Unable to obtain the ROS. 22 --> F/U YADY, Hx of HD      No new complaints. Objective:   Vital Signs:    Visit Vitals  /87   Pulse 97   Temp 99.5 °F (37.5 °C)   Resp 18   Ht 5' 8\" (1.727 m)   Wt 62.8 kg (138 lb 7.2 oz)   SpO2 100%   BMI 21.05 kg/m²       O2 Device: Tracheostomy, Ventilator   O2 Flow Rate (L/min): 10 l/min   Temp (24hrs), Av.2 °F (36.8 °C), Min:97.5 °F (36.4 °C), Max:99.5 °F (37.5 °C)       Intake/Output:   Last shift:       0701 -  190  In: 200 [P.O.:200]  Out: 325 [Urine:325]  Last 3 shifts:  190 -  0700  In: 3205.8 [P.O.:100; I.V.:40]  Out: 1825 [Urine:1650; Drains:175]    Intake/Output Summary (Last 24 hours) at 2022 1017  Last data filed at 2022 0958  Gross per 24 hour   Intake 2065.83 ml   Output 1400 ml   Net 665.83 ml          Physical Exam:      Seen in CCU - Bed 25. General:     Awake                           On vent support    Head:   Normocephalic,  bitemporal muscle wasting. Eyes:   Conjunctivae/corneas clear. Neck:  Trach    Lungs:   no wheezes, no rales, no rhonchi. Heart:   Reg, No S 3 gallop, no pericardial rub  . Abdomen:   Not distended.                             PEG, suprapubic urinary cath    Extremities: Muscle wasting --> 2 + upper thigh                          Leg oedema -> mild     Skin:     Eschar - heel., Large sacral wound (not seen today)    Psych:  Calm    Neurologic: Awake and interacting appropriately    DATA:  Labs:  Recent Labs     22  0147 22  0418 22  0350    137 137   K 2.9* 2.9* 3.2*    105 105   CO2 27 25 26   BUN 59* 55* 48*   CREA 2.23* 2.24* 2.01*   CA 8.5 8.7 8.3*   ALB 2.0* 2.0* 2.2*   PHOS 2.4*  --   --    MG  --  2.4 2.4       Recent Labs     22  0147 22  0418 22  0350   WBC 6.7 6.8 10.4   HGB 8.2* 6.6* 6.8*   HCT 25.1* 20.3* 21.1*    340 323       No results for input(s): YAMIL, HILDA, CHELSEA CALLOWAY in the last 72 hours.     No lab exists for component: PROU    Total time spent with patient:           Care Plan discussed with:        Sameer Valdez MD

## 2022-08-22 NOTE — PROGRESS NOTES
Pt continues to have need for restarints. Pt continues to have significant wounds and stool entering wound despite wound vac.

## 2022-08-22 NOTE — PROGRESS NOTES
0230: During bath and while cleaning extensive large continuous BM, pt noted to have panic attack. Did not tolerate having head down for turning and cleaning. HR up to 130's, tachyneic as well. Pt attempted to pull at trach multiple times during this episode. Also noted intermittent leaking of wound vac. Used guided deep breathing technique to help calm patient. 0335: Upon reviewing labs from most recent draw noted potassium level still 2.9 despite earlier repletion. Message sent to Dr. Richelle Patrick regarding results and orders received.

## 2022-08-22 NOTE — WOUND CARE
Wound Care Note:     Follow-up visit for wound VAC dressing change. Chart shows:  Admitted for sepsis, UTI with a history of non-ischemic cardiomyopathy with EF 15 to 20%, afib, PE, pulmonary HTN, PEA cardiac arrest, chronic respiratory fialure with trach, acute on chronic anemia, multiple wounds, sysphagia, chronic hypotension, DM, paraplegia, supapubic catheter    WBC = 6.7 on 8/22/22  Admitted from [address] [place] [address] Road:   Patient is alert, mouths words, incontinent with moderate assistance needed in repositioning. Bed: Envella  Patient has a suprapubic. Diet: Adult diet regular and tube feeding - PEG  Patient reports no pain. Unable to keep seal on wound VAC, stool got up in VAC dressing. Wound VAC removed until ostomy created. 1. POA sacral, hip and ischial wounds look about the same, some with slight tan hue that was mostly able to be cleaned off, moderate sero/sang drainage. VASHE moist to dry dressing applied to right hip and bilateral ischial's. Sacrum and left hip Santyl ointment then VASHE moist to dry dressing applied. Hopefully, ostomy can be created early this week and can resume wound VAC. 2.  POA right lateral lower leg wound is still dry at distal end, proximal end looks good, lots of new red tissue. Tegaderm applied to distal end and Santyl moist to dry dressing applied to proximal.  New orders to be written. 3.  POA right medial knee with small amount of slough in wound bed, remainder is pink/red. Hydrocolloid applied. Spoke with Dr. Sofía Hunter, wound care orders obtained. Patient repositioned on left side. Heels offloaded on pillows. Recommendations:     Right hip and bilateral ischials- Daily cleanse with VASHE (blue bottle in room), moisten roll gauze with VASHE and loosely fill wounds and cover.      Sacrum and left hip - Daily cleanse with VASHE (blue bottle in room), apply nickel thickness of Santyl to areas of slough (tan/brown tissue), moisten roll gauze with VASHE and loosely fill wounds and cover. Left heel- Every other day paint with Betadine     Right medial knee- Please maintain Exuderm Hydrocolloid up to one week or change as needed with soiling or rolled edges. Please use adhesive remover wipes when changing. Right lateral lower leg- Distal end change Tegaderm as needed when coming loose or leaking; clean then reapply Tegaderm. Proximal end daily cleanse with normal saline, wipe wound bed clean and dry, apply nickel thickness of Santyl ointment to tan tissue, apply gauze moistened with VASHE and cover. Specialty bed: Envella ordered via [employer]. Use only flat sheet and one incontinence pad. Please call [phone] when patient discharged. Skin Care & Pressure Prevention:  Minimize layers of linen/pads under patient to optimize support surface. Turn/reposition approximately every 2 hours and offload heels.   Manage incontinence / promote continence   Nourishing Skin Cream to dry skin, minimize use of briefs when able    Discussed above plan with patient & Nemo Venegas RN    Transition of Care: Plan to follow as needed while admitted to hospital.    RAYA Sam, RN, [employer]  Certified Wound and Ostomy Nurse  office [phone]  Best way to contact me is through [address] [place] [address]

## 2022-08-22 NOTE — PROGRESS NOTES
Pt assessed. Cathflo instilled into right arm single lumen PICC. Pt very interactive. Inquired about his restraints and had discussion regarding history on this admission of intermittent confusion and pulling of several important lines. Explained that this is a safety precaution at this time. Pt nodded and mouthed that he understood. Also inquired with patient about possible interest in foot ball and if he wanted to watch it. Pt very happy to be able to view on television. Will continue to monitor.

## 2022-08-22 NOTE — PROGRESS NOTES
Informed by wound care team of significant fecal soilage of wounds beneath wound VAC sponges. Ejection fraction 15-20%; he is now off Eliquis. Patient will benefit from fecal diversion via laparoscopic colostomy. Given significant impairment in cardiac function, he will need cardiac clearance before proceeding. Would anticipate doing procedure on Wednesday if cardiac clearance for risk stratification obtained. Continue antibiotics, local wound care.

## 2022-08-22 NOTE — PROGRESS NOTES
CRITICAL CARE NOTE      Name: Leti Freedman   : 1980   MRN: [id number]   Date: 2022      REASON FOR ICU ADMISSION:  Acute renal failure, chronic vent dependence, septic shock     PRINCIPAL ICU DIAGNOSIS   Septic shock, unclear etiology  Acute renal injury  Chronic hypoxic respiratory failure, s/p trach, vent dependent  Chronic decubitus pressure injury ulcers, present on admission  HFrEF (20-25%)  Afib  Chronic hypotension  Paraplegia    BRIEF PATIENT SUMMARY   70-year-old male with known past medical history of multiple chronic issues as listed in problem list, who presented to [facility] ED after being sent from [facility] for possible need for CRRT for YADY due to inability to tolerate iHD due to sepsis/septic shock from unclear source. COMPREHENSIVE ASSESSMENT & PLAN:SYSTEM BASED     24 HOUR EVENTS:   Back on the vent overnight due to increased work of breathing. NEUROLOGICAL:   -Awake and alert in no acute distress  -history of paraplegia  - PT/OT SLP consulted  - Dysphagia with GJ tube in place, however cleared for PO by SLP    PULMONOLOGY:   -Chronic respiratory failure with trach in place ( placed 2022)  -Vent wean as tolerated. -PRN nebs/suctioning  -ABCDEF protocol    CARDIOVASCULAR:     - Atrial fibrillation on eliquis  -Non-ischemic cardiomyopathy with EF 15-20%  -Recent PEA arrest (multiple)  -Pulmonary hypertension with right ventricular dysfunction  -Continue midodrine, florinef, mexiletine. PRN midodrine 10mg pre-HD  - holding entresto due to hypotension  -TTE ordered for preop work-up diverting colostomy next week-shows LVEF of 15 to 20%. -Hold Eliquis for 24 hours before surgery. GASTROINTESTINAL     -Likely cyclic tube feeds as the patient is eating.  - calorie count today, may need to start 24hr TF    RENAL/ELECTROLYTE/FLUIDS:     -Last dialysis on . No acute needs for renal placement therapy.   Bumex challenge in progress.  -Trend lytes and replete as needed  -Florinef, midodrine, EPO  -Suprapubic catheter care    ENDOCRINE:   -Continue synthroid    HEMATOLOGY/ONCOLOGY:   -We will give 1 unit of PRBC for hemoglobin of 6.6. Gradually downtrending.  - on eliquis    INFECTIOUS DISEASE:   S/p Vanc/Zosyn for 10 days. Wound cultures polymicrobial with light MRSA and MDR organisms. Respiratory cultures growing light Proteus mirabilis and achromobacter. Wound Vac placed today. Appreciate surgical consultation-likely diverting colostomy this  week. ICU DAILY CHECKLIST     Code Status:Partial (No Shocks, No Compressions) Meds okay  DVT Prophylaxis:Eliquis  T/L/D: Ye, GJ tube, Colostomy, suprapubic catheter, PIV  SUP: None  Diet: Tube Feed,PO as tolerated  Activity Level:Paraplegia  ABCDEF Bundle/Checklist Completed:Yes  Disposition: Stay in ICU. Initiate discharge planning. Multidisciplinary Rounds Completed: Yes  Patient/Family Updated: Yes       HOSPITAL COURSE/DAILY EVENT LOG   8/ pan Cx started on Vanc/Zosyn  8/9 started n CRRT levophed    SUBJECTIVE   Review of Systems   Constitutional:  Negative for chills, diaphoresis and fever. HENT:  Negative for congestion, ear pain, hearing loss, nosebleeds, sinus pain and sore throat. Eyes:  Negative for blurred vision, double vision and pain. Respiratory:  Negative for cough, sputum production, shortness of breath and wheezing. Cardiovascular:  Negative for chest pain, palpitations and orthopnea. Gastrointestinal:  Negative for abdominal pain, constipation, heartburn, nausea and vomiting. Genitourinary:  Negative for hematuria. Musculoskeletal:  Negative for falls, joint pain and myalgias. Skin:  Negative for itching and rash. Neurological:  Negative for dizziness, tremors, speech change, focal weakness, weakness and headaches. Endo/Heme/Allergies:  Does not bruise/bleed easily. Psychiatric/Behavioral:  Negative for depression.        OBJECTIVE     Labs and Data: Reviewed 08/22/22  Medications: Reviewed 22  Imaging: Reviewed 22    Physical Exam  Vitals and nursing note reviewed. Constitutional:       General: He is awake. He is not in acute distress. Appearance: He is underweight. HENT:      Head: Normocephalic and atraumatic. Nose: Nose normal.      Mouth/Throat:      Comments: trach  Eyes:      General: Lids are normal.      Conjunctiva/sclera: Conjunctivae normal.      Pupils: Pupils are equal, round, and reactive to light. Neck:     Cardiovascular:      Rate and Rhythm: Normal rate. Rhythm irregularly irregular. Heart sounds: Normal heart sounds, S1 normal and S2 normal. No murmur heard. No friction rub. No gallop. Pulmonary:      Effort: Pulmonary effort is normal. No tachypnea. Breath sounds: Normal breath sounds and air entry. Comments: On vent  Abdominal:      General: The ostomy site is clean. Bowel sounds are normal.       Musculoskeletal:      Right lower leg: No edema. Left lower leg: No edema. Comments: Chronic numerous pressure ulcer wounds throughout, present on admission see wound care note for further details   Skin:     General: Skin is warm and dry. Capillary Refill: Capillary refill takes 2 to 3 seconds. Coloration: Skin is pale. Findings: Wound present. Neurological:      Mental Status: He is alert, oriented to person, place, and time and easily aroused. Mental status is at baseline. GCS: GCS eye subscore is 4. GCS verbal subscore is 5. GCS motor subscore is 6. Psychiatric:         Behavior: Behavior is cooperative.         Visit Vitals  /85   Pulse 99   Temp 99.5 °F (37.5 °C)   Resp 16   Ht 5' 8\" (1.727 m)   Wt 62.8 kg (138 lb 7.2 oz)   SpO2 100%   BMI 21.05 kg/m²    O2 Flow Rate (L/min): 10 l/min O2 Device: Tracheostomy, Ventilator Temp (24hrs), Av.3 °F (36.8 °C), Min:97.5 °F (36.4 °C), Max:99.5 °F (37.5 °C)           Intake/Output:     Intake/Output Summary (Last 24 hours) at 2022 1203  Last data filed at 8/22/2022 1150  Gross per 24 hour   Intake 1935.83 ml   Output 1250 ml   Net 685.83 ml         Imaging       Pertinent imaging reviewed and interpreted independently as noted above    CRITICAL CARE DOCUMENTATION  I had a face to face encounter with the patient, reviewed and interpreted patient data including clinical events, labs, images, vital signs, I/O's, and examined patient. I have discussed the case and the plan and management of the patient's care with the consulting services, the bedside nurses and the respiratory therapist.      NOTE OF PERSONAL INVOLVEMENT IN CARE   This patient has a high probability of imminent, clinically significant deterioration, which requires the highest level of preparedness to intervene urgently. I participated in the decision-making and personally managed or directed the management of the following life and organ supporting interventions that required my frequent assessment to treat or prevent imminent deterioration. I personally spent 35 minutes of critical care time. This is time spent at this critically ill patient's bedside actively involved in patient care as well as the coordination of care. This does not include any procedural time which has been billed separately.     Dunia Snatamaria DO  Staff Intensivist  [facility]  8/22/2022

## 2022-08-22 NOTE — PROGRESS NOTES
Problem: Dysphagia (Adult)  Goal: *Acute Goals and Plan of Care (Insert Text)  Description: Speech Therapy Goals  Initiated 8/11/22    1. Patient will tolerate regular diet/thin liquids without adverse effects within 7 days. Outcome: Progressing Towards Goal     Problem: Voice Impaired (Adult)  Goal: *Acute Goals and Plan of Care (Insert Text)  Description: Speech Therapy Goals  Initiated 8/11/22    1) Pt will tolerate the speaking valve without adverse effects within 7 days. Outcome: Progressing Towards Goal    SPEECH LANGUAGE PATHOLOGY DYSPHAGIA TREATMENT  Patient: Rodrigo Sullivan (47 y.o. male)  Date: 8/22/2022  Diagnosis: YADY (acute kidney injury) ([facility] [address] [N17.9] <principal problem not specified>  Procedure(s) (LRB):  COLOSTOMY LAPAROSCOPIC  ESSENTIAL  REQ TF (N/A)    Precautions:       ASSESSMENT:  Patient tolerated PMV placement without change in vital signs. No back pressure noted upon PMV removal after 20 minutes. Patient able to voice at phoneme level 2 of 10 trials. Voice is low volume and nearly unintelligible. Recommend PMV with supervision. While PMV in place patient tolerated solids and thin liquids without difficulty. Patient took large bites and thus mastication mildly prolonged but complete. No oral residue. No overt s/s aspiration or difficulty. Vital signs remained stable during PO trials. Given presentation this date feel patient is safe to continue diet. Recommend PMV while eating. PLAN:  Recommendations and Planned Interventions:  PMV with supervision and with meals  Patient continues to benefit from skilled intervention to address the above impairments. Continue treatment per established plan of care. Speaking Valve Placement:  SLP / RT / RN only  Recommended Speaking Valve Wearing Schedule:    [x]    As tolerated  Discharge Recommendations: To Be Determined     SUBJECTIVE:   Patient nodded. RN approved visit.     OBJECTIVE:   Cognitive and Communication Status:  Neurologic State: Alert  Orientation Level: Oriented to place, Oriented to person  Cognition: Follows commands  Perception: Appears intact  Perseveration: No perseveration noted  Safety/Judgement: Insight into deficits  Tracheostomy:  Tracheostomy Data/Eval  Trach Size/Status: #6 ;Cuffed, cuff deflated     Oxygen Therapy  O2 Sat (%): 100 %  FIO2 (%): 40 %  Dysphagia Treatment:  Tracheostomy:        PMV Trial   Application: SLP  Tolerance: Tolerated without changes in vitals, breathing effort or level of anxiety  Vocal Quality: Aphonic;Breathy;Low volume  Vocal Intensity: Too soft  Oral Assessment:     P.O. Trials:  Patient Position: Upright in bed  Vocal quality prior to P.O.: Aphonic;Breathy;Low volume  Consistency Presented: Thin liquid; Solid  How Presented: SLP-fed/presented;Straw     Bolus Acceptance: No impairment  Bolus Formation/Control: No impairment     Propulsion: No impairment  Oral Residue: None        Aspiration Signs/Symptoms: None                      After treatment:   Patient left in no apparent distress in bed, Call bell within reach, and Nursing notified    COMMUNICATION/EDUCATION:   Patient was educated regarding role of SLP, PMV use and POC. The patient's plan of care including recommendations, planned interventions, and recommended diet changes were discussed with: Registered nurse. Education was provided to patient, family, and staff regarding speaking valve placement, wearing schedule, safety precautions including cuff deflation and removal when sleeping, and care/cleaning guidelines.       TUSHAR Ramirez  Time Calculation: 20 mins

## 2022-08-22 NOTE — PROGRESS NOTES
0730 Bedside and Verbal shift change report given to Robert Thomas (oncoming nurse) by Adelina Issa (offgoing nurse). Report included the following information SBAR, Kardex, Procedure Summary, Intake/Output, MAR, Accordion, and Recent Results. 1045 Wound care @ bedside for woundvac dressing change. Pt's woundvac dressing saturated with stool. Woundvac dressing removed and wet to dry dressing applied.

## 2022-08-22 NOTE — PROGRESS NOTES
Single lumen PICC reassessed and still no blood return. Will indwell for 120 minutes per protocol and reassess.

## 2022-08-22 NOTE — PROGRESS NOTES
JADEN: Anticipate discharge to [facility] in [address] [place] [address] pending medical progress and insurance authorization. Transportation likely ALS. RUR: 16%     Emergency contact: Lay Anderson, sister, [phone]  Jaiden Herring, brother, [phone] or [phone]     Disposition: Patient admitted from [address] [place] [address] 8/9 for YADY. Patient has a trach and is on a vent. Hx: paraplegia, skin ulcers, afib, chronic sepsis, cardiac arrest, CVA, chronic loya, tracheostomy ventilator, gtube, pulmonary embolism, systolic CHF, HTN, HLD, diabetes type II. CM faxed updated clinicals to Chelsea [facility] (phone: [phone]) w/Lori at [phone]. Still awaiting insurance authorization. General surgery recommending fecal diversion via laparoscopic colostomy. Potential for procedure on Wednesday. CM will continue to follow for discharge needs.     Marli Anderson, [facility],[address]  [phone]

## 2022-08-23 LAB
ALBUMIN SERPL-MCNC: 1.9 G/DL (ref 3.5–5)
ALBUMIN/GLOB SERPL: 0.4 {RATIO} (ref 1.1–2.2)
ALP SERPL-CCNC: 224 U/L (ref 45–117)
ALT SERPL-CCNC: 75 U/L (ref 12–78)
ANION GAP SERPL CALC-SCNC: 6 MMOL/L (ref 5–15)
AST SERPL-CCNC: 52 U/L (ref 15–37)
BASOPHILS # BLD: 0 K/UL (ref 0–0.1)
BASOPHILS NFR BLD: 0 % (ref 0–1)
BILIRUB SERPL-MCNC: 0.2 MG/DL (ref 0.2–1)
BUN SERPL-MCNC: 66 MG/DL (ref 6–20)
BUN/CREAT SERPL: 30 (ref 12–20)
CALCIUM SERPL-MCNC: 8.5 MG/DL (ref 8.5–10.1)
CHLORIDE SERPL-SCNC: 103 MMOL/L (ref 97–108)
CO2 SERPL-SCNC: 29 MMOL/L (ref 21–32)
COMMENT, HOLDF: NORMAL
CREAT SERPL-MCNC: 2.17 MG/DL (ref 0.7–1.3)
DIFFERENTIAL METHOD BLD: ABNORMAL
EOSINOPHIL # BLD: 0.1 K/UL (ref 0–0.4)
EOSINOPHIL NFR BLD: 2 % (ref 0–7)
ERYTHROCYTE [DISTWIDTH] IN BLOOD BY AUTOMATED COUNT: 16.6 % (ref 11.5–14.5)
GLOBULIN SER CALC-MCNC: 5.3 G/DL (ref 2–4)
GLUCOSE SERPL-MCNC: 234 MG/DL (ref 65–100)
HCT VFR BLD AUTO: 25.3 % (ref 36.6–50.3)
HGB BLD-MCNC: 8.1 G/DL (ref 12.1–17)
IMM GRANULOCYTES # BLD AUTO: 0 K/UL
IMM GRANULOCYTES NFR BLD AUTO: 0 %
LYMPHOCYTES # BLD: 0.6 K/UL (ref 0.8–3.5)
LYMPHOCYTES NFR BLD: 8 % (ref 12–49)
MAGNESIUM SERPL-MCNC: 2.4 MG/DL (ref 1.6–2.4)
MCH RBC QN AUTO: 28.9 PG (ref 26–34)
MCHC RBC AUTO-ENTMCNC: 32 G/DL (ref 30–36.5)
MCV RBC AUTO: 90.4 FL (ref 80–99)
MONOCYTES # BLD: 0.9 K/UL (ref 0–1)
MONOCYTES NFR BLD: 13 % (ref 5–13)
NEUTS SEG # BLD: 5.4 K/UL (ref 1.8–8)
NEUTS SEG NFR BLD: 77 % (ref 32–75)
NRBC # BLD: 0 K/UL (ref 0–0.01)
NRBC BLD-RTO: 0 PER 100 WBC
PLATELET # BLD AUTO: 331 K/UL (ref 150–400)
PMV BLD AUTO: 10.9 FL (ref 8.9–12.9)
POTASSIUM SERPL-SCNC: 3.5 MMOL/L (ref 3.5–5.1)
PROT SERPL-MCNC: 7.2 G/DL (ref 6.4–8.2)
RBC # BLD AUTO: 2.8 M/UL (ref 4.1–5.7)
RBC MORPH BLD: ABNORMAL
SAMPLES BEING HELD,HOLD: NORMAL
SODIUM SERPL-SCNC: 138 MMOL/L (ref 136–145)
WBC # BLD AUTO: 7 K/UL (ref 4.1–11.1)

## 2022-08-23 PROCEDURE — 85025 COMPLETE CBC W/AUTO DIFF WBC: CPT

## 2022-08-23 PROCEDURE — 74011250637 HC RX REV CODE- 250/637: Performed by: INTERNAL MEDICINE

## 2022-08-23 PROCEDURE — 51798 US URINE CAPACITY MEASURE: CPT

## 2022-08-23 PROCEDURE — 74011250637 HC RX REV CODE- 250/637: Performed by: STUDENT IN AN ORGANIZED HEALTH CARE EDUCATION/TRAINING PROGRAM

## 2022-08-23 PROCEDURE — 74011250636 HC RX REV CODE- 250/636: Performed by: STUDENT IN AN ORGANIZED HEALTH CARE EDUCATION/TRAINING PROGRAM

## 2022-08-23 PROCEDURE — 36415 COLL VENOUS BLD VENIPUNCTURE: CPT

## 2022-08-23 PROCEDURE — 94003 VENT MGMT INPAT SUBQ DAY: CPT

## 2022-08-23 PROCEDURE — 74011250637 HC RX REV CODE- 250/637: Performed by: NURSE PRACTITIONER

## 2022-08-23 PROCEDURE — 80053 COMPREHEN METABOLIC PANEL: CPT

## 2022-08-23 PROCEDURE — 74011000250 HC RX REV CODE- 250: Performed by: NURSE PRACTITIONER

## 2022-08-23 PROCEDURE — 65620000000 HC RM CCU GENERAL

## 2022-08-23 PROCEDURE — 74011000250 HC RX REV CODE- 250: Performed by: INTERNAL MEDICINE

## 2022-08-23 PROCEDURE — 74011250636 HC RX REV CODE- 250/636

## 2022-08-23 PROCEDURE — P9045 ALBUMIN (HUMAN), 5%, 250 ML: HCPCS

## 2022-08-23 PROCEDURE — 74011636637 HC RX REV CODE- 636/637: Performed by: NURSE PRACTITIONER

## 2022-08-23 PROCEDURE — 99223 1ST HOSP IP/OBS HIGH 75: CPT | Performed by: SPECIALIST

## 2022-08-23 PROCEDURE — 83735 ASSAY OF MAGNESIUM: CPT

## 2022-08-23 RX ORDER — ALBUMIN HUMAN 50 G/1000ML
SOLUTION INTRAVENOUS
Status: COMPLETED
Start: 2022-08-23 | End: 2022-08-23

## 2022-08-23 RX ORDER — ALBUMIN HUMAN 50 G/1000ML
25 SOLUTION INTRAVENOUS ONCE
Status: COMPLETED | OUTPATIENT
Start: 2022-08-23 | End: 2022-08-23

## 2022-08-23 RX ORDER — QUETIAPINE FUMARATE 25 MG/1
50 TABLET, FILM COATED ORAL 2 TIMES DAILY
Status: DISCONTINUED | OUTPATIENT
Start: 2022-08-23 | End: 2022-08-26

## 2022-08-23 RX ADMIN — Medication 10 ML: at 06:00

## 2022-08-23 RX ADMIN — MINERAL SUPPLEMENT IRON 300 MG / 5 ML STRENGTH LIQUID 100 PER BOX UNFLAVORED 300 MG: at 08:37

## 2022-08-23 RX ADMIN — Medication 10 ML: at 21:27

## 2022-08-23 RX ADMIN — MINERAL SUPPLEMENT IRON 300 MG / 5 ML STRENGTH LIQUID 100 PER BOX UNFLAVORED 300 MG: at 16:22

## 2022-08-23 RX ADMIN — HYDROMORPHONE HYDROCHLORIDE 0.5 MG: 1 INJECTION, SOLUTION INTRAMUSCULAR; INTRAVENOUS; SUBCUTANEOUS at 08:59

## 2022-08-23 RX ADMIN — BUMETANIDE 1 MG: 0.25 INJECTION, SOLUTION INTRAMUSCULAR; INTRAVENOUS at 08:36

## 2022-08-23 RX ADMIN — MIDODRINE HYDROCHLORIDE 20 MG: 5 TABLET ORAL at 21:26

## 2022-08-23 RX ADMIN — ALBUMIN HUMAN 25 G: 50 SOLUTION INTRAVENOUS at 13:14

## 2022-08-23 RX ADMIN — FLUDROCORTISONE ACETATE 0.1 MG: 0.1 TABLET ORAL at 08:37

## 2022-08-23 RX ADMIN — LEVOTHYROXINE SODIUM 50 MCG: 0.05 TABLET ORAL at 06:00

## 2022-08-23 RX ADMIN — QUETIAPINE FUMARATE 50 MG: 25 TABLET ORAL at 21:26

## 2022-08-23 RX ADMIN — CHOLESTYRAMINE 4 G: 4 POWDER, FOR SUSPENSION ORAL at 17:41

## 2022-08-23 RX ADMIN — Medication 10 ML: at 13:18

## 2022-08-23 RX ADMIN — MINERAL SUPPLEMENT IRON 300 MG / 5 ML STRENGTH LIQUID 100 PER BOX UNFLAVORED 300 MG: at 11:34

## 2022-08-23 RX ADMIN — COLLAGENASE SANTYL: 250 OINTMENT TOPICAL at 08:37

## 2022-08-23 RX ADMIN — ATORVASTATIN CALCIUM 40 MG: 40 TABLET, FILM COATED ORAL at 21:26

## 2022-08-23 RX ADMIN — MEXILETINE HYDROCHLORIDE 150 MG: 150 CAPSULE ORAL at 06:00

## 2022-08-23 RX ADMIN — Medication 5 UNITS: at 08:37

## 2022-08-23 RX ADMIN — HYDROCODONE BITARTRATE AND ACETAMINOPHEN 1 TABLET: 10; 325 TABLET ORAL at 21:26

## 2022-08-23 RX ADMIN — MEXILETINE HYDROCHLORIDE 150 MG: 150 CAPSULE ORAL at 16:22

## 2022-08-23 RX ADMIN — MIDODRINE HYDROCHLORIDE 20 MG: 5 TABLET ORAL at 12:48

## 2022-08-23 RX ADMIN — POTASSIUM BICARBONATE 20 MEQ: 782 TABLET, EFFERVESCENT ORAL at 09:45

## 2022-08-23 RX ADMIN — ALBUMIN (HUMAN) 25 G: 12.5 INJECTION, SOLUTION INTRAVENOUS at 13:14

## 2022-08-23 RX ADMIN — BUMETANIDE 1 MG: 0.25 INJECTION, SOLUTION INTRAMUSCULAR; INTRAVENOUS at 17:41

## 2022-08-23 RX ADMIN — CHOLESTYRAMINE 4 G: 4 POWDER, FOR SUSPENSION ORAL at 09:42

## 2022-08-23 RX ADMIN — Medication: at 10:29

## 2022-08-23 RX ADMIN — QUETIAPINE FUMARATE 50 MG: 25 TABLET ORAL at 11:34

## 2022-08-23 RX ADMIN — CHLORHEXIDINE GLUCONATE 15 ML: 1.2 RINSE ORAL at 08:37

## 2022-08-23 RX ADMIN — MIDODRINE HYDROCHLORIDE 20 MG: 5 TABLET ORAL at 06:00

## 2022-08-23 RX ADMIN — CHLORHEXIDINE GLUCONATE 15 ML: 1.2 RINSE ORAL at 21:26

## 2022-08-23 NOTE — PROGRESS NOTES
Name: Leti Freedman MRN: [id number]   : 1980 [facility]   Date: 2022        IMPRESSION:   YADY, started on iHD initially. patient became hypotensive and was switched to CRRT. --off CRRT on IHD, urine output increasing, Bp stable. Pulled his Ye out -off HD since   Hypotension resolved, off iv pressor, on midodrine  Hypophosphatemia- resolved  Debility with multiple pressure ulcers  Respiratory failure - on vent, weaning  Anemia of chronic disease-   Hyperkalemia - resolved with RRT  Protein deficiency  Wounds  Hypokalemia       PLAN:   S/p HD , no need for HD, Creatinine is stable , good urine output  IV bumex 1 mg bid  Follow lytes. K supplements today 20 meq  Midodrine 20 mg tid  Watch for GFR recovery --> Prior to starting dialysis patient's Scr was 0.8-   Epo increased to 14K/week, po Iron  Tube feeding nepro  Dose meds for his GFR. Avoid NSAIDs + IV contrast.  Abx per ICU team     Subjective/Interval History:   I have reviewed the flowsheet and previous days notes. Seen on CRRT, awake and alert, denies pain/SOB with nodding head      F/U - YADY , CRRT --> 2022    Remains on CRRT + pressors. No new complaints were offered.  seen on CRRT, d/w ICU RN  8 CRRT stopped, BP stable on minimal Levophed, getting NeutraPhos  8/15 awake and alert, on vent. BP stable, had 175 ml UO yesterday, 150 ml today sofar. Cr 1.1. will add bumex if BP toleerates, no HD today  , awake on vent. UO has increased had 1 lit yesterday with bumex, cr increasing. BP stable, will hold HD today again   sleeping on vent, arousable, BP stable in 120s, had 1100 ml u/o and 500 overnight. Cr higher  , awake, off vent on trach collar, pulled his ye this am, UO up       - F/U - YADY, Hx of HD --> 22    No new complaints. 22 - F/U - Yady , Hx of HD --> 22    No complaints. 22 - F/U YADY , Hx of HD    Unable to obtain the ROS.       22 --> F/U YADY, Hx of HD  No new complaints.  awake and alert, on trach collar, good uo, Cr stable    Objective:   Vital Signs:    Visit Vitals  /87   Pulse (!) 113   Temp 98.9 °F (37.2 °C)   Resp 22   Ht 5' 8\" (1.727 m)   Wt 63.7 kg (140 lb 6.9 oz)   SpO2 100%   BMI 21.35 kg/m²       O2 Device: Tracheostomy, Ventilator   O2 Flow Rate (L/min): 10 l/min   Temp (24hrs), Av.7 °F (37.1 °C), Min:98.5 °F (36.9 °C), Max:98.9 °F (37.2 °C)       Intake/Output:   Last shift:      No intake/output data recorded. Last 3 shifts:  1901 -  0700  In: 1890 [P.O.:200]  Out: 2850 [Urine:2825; Drains:25]    Intake/Output Summary (Last 24 hours) at 2022 0905  Last data filed at 2022 0300  Gross per 24 hour   Intake 510 ml   Output 2725 ml   Net -2215 ml          Physical Exam:      Seen in CCU - Bed 25. General:     Awake                           On TC    Head:   Normocephalic,  bitemporal muscle wasting. Eyes:   Conjunctivae/corneas clear. Neck:  Trach    Lungs:   no wheezes, no rales, no rhonchi. Heart:   Reg, No S 3 gallop, no pericardial rub  . Abdomen:   Not distended. PEG, suprapubic urinary cath    Extremities: Muscle wasting --> 2 + upper thigh                          Leg oedema -> mild     Skin:     Eschar - heel., Large sacral wound     Psych:  Calm    Neurologic: Awake and interacting appropriately    DATA:  Labs:  Recent Labs     22  0438 22  0147 22  0418    137 137   K 3.5 2.9* 2.9*    104 105   CO2 29 27 25   BUN 66* 59* 55*   CREA 2.17* 2.23* 2.24*   CA 8.5 8.5 8.7   ALB 1.9* 2.0* 2.0*   PHOS  --  2.4*  --    MG 2.4  --  2.4       Recent Labs     22  0438 22  0147 22  0418   WBC 7.0 6.7 6.8   HGB 8.1* 8.2* 6.6*   HCT 25.3* 25.1* 20.3*    338 340       No results for input(s): YAMIL, KU, CLU, CREAU in the last 72 hours.     No lab exists for component: PROU    Total time spent with patient: Care Plan discussed with:        Jerica Bolaños MD

## 2022-08-23 NOTE — PROGRESS NOTES
Transitions of Care:    Disposition: [facility]  in [address] [place] [address] - authorization pending at this time    RUR - 17%    Emergency contact: Nini Silva, sister, [phone]  Sanjuanita Bumpers, brother, [phone] or [phone]       Patient admitted from [facility] - has trache and vent, peg, suprapubic catheter, extensive wound care, HD,  on hand restraints r/t pulling at lines. Plan on Weds for fecal diversion vs laparoscopic colostomy. CM spoke with Hoda Holloway ph# [phone] with [facility] fax# [phone] and confirmed receipt of all clinicals and authorization has been started as it will take a a while to get authorization from insurance. Also [facility] can accept patient without new dialysis access and nursing made aware.   TEJ Murdock

## 2022-08-23 NOTE — CONSULTS
[facility]  Cardiology Care Note                  [x]Initial visit     []Established visit     Patient Name: Yovani Wilkerson - KV - AON:[id number]  Primary Cardiologist: none  Consulting Cardiologist: Adelaida Lyons MD   Reason for consult:cardiac clearance     HPI:   Patient is a 38year old male with a hx of paraplegia, multiple decubitus ulcers, PE on [address] [place] [address] Road, CHF with EF 15-20% per echo 22, PEA cardiac arrest x7, NSVT, right heart cath 22 showing pHTN, right ventricular dysfunction and low cardiac output, respiratory failure s/p trach requiring ventilator off and on, encephalopathy, anemia, and YADY. He was admitted to the hospital for septic shock, acute YADY requiring intermittent dialysis, and anemia requiring blood transfusion. Due to plans for fecal diversion via lap colostomy we have been asked to provide cardiac clearance      SUBJECTIVE:         Assessment and Plan     1.cardiac clearance; patient is high risk for surgical procedure d/t multiple co-morbidities. 2.CHF with EF 15-20%: not able to tolerate GDMT d/t hypotension. 3.Atrial fibrillation: on Eliquis prior to admission. Currently being held. 4. Hx PE: was previously on [address] [place] [address], currently held    Further A/P per Dr Son Peña    Assessment/Plan/Discussion:Cardiology Attending:     Patient seen on the day of progress note, examined and reviewed  with Nurse Practitioner. Shelby Porter is a 38 y.o. male with paraplegia for a decade , poor EF chronic systolic CHF,PE.  PA HTN  Prolonged admit at a [address] for  and July  with multiple PEA arrests, CHF EF 15% , due to urosepsis and bacteremia  Pt was at [facility] now admit to [facility] and [facility] S[facility] planning diverting colostomy  Pt in CCU, on trach, mumbles to questions ,   O: VS reviewed   Patient Vitals for the past 4 hrs:   Temp Pulse Resp BP   22 1700 -- 98 21 98/65   22 1600 97.1 °F (36.2 °C) (!) 109 24 96/76   08/23/22 1500 -- 94 21 105/64   PE as per note as addended     L:  Lab Results   Component Value Date/Time    Creatinine 2.17 (H) 08/23/2022 04:38 AM     No results found for: BNP, BNPP, BNPPPOC, XBNPT, BNPNT  Lab Results   Component Value Date/Time    HGB 8.1 (L) 08/23/2022 04:38 AM       A/P:  CHF systolic, chronic NYHA   EF now lower at 15%  Off LifeVest as is DNR   Prior PEA arrest  Atrial Fibrillation   Preop risk for CV event is high, but given circumstances, would not say it is prohibitive as long as pt and POA understand high CV risk  [facility] held for surgery, following    Sendy Patricia MD   This note was created using voice recognition software. Despite editing, there may be syntax errors. ____________________________________________________________    Cardiac testing  08/08/22    ECHO ADULT COMPLETE 08/19/2022 8/19/2022    Interpretation Summary  Formatting of this result is different from the original.      Left Ventricle: Severely reduced left ventricular systolic function with a visually estimated EF of 15 - 20%. Left ventricle is dilated. Normal wall thickness. Severe global hypokinesis present. Mitral Valve: Mild regurgitation. Left Atrium: Left atrium is dilated. Pulmonary Arteries: Mild pulmonary hypertension present. Pericardium: Left pleural effusion. Technical qualifiers: Echo study was limited due to patient's condition. Signed by: Tom Perze MD on 8/19/2022  5:36 PM        Right heart catheterization on 5/26/22 showed a RA pressure of 2-4 mmHg, RV pressure of 27/4 mmHg, PA pressure of 27/12/17 mmHg, and a PW pressure of 6-8 mmHg. PA saturation was 58%. Cardiac output by Harsha calculation was 5.7 L/min with a corresponding index of 2.8 L/min/m2. PVR is 1.6 CROSS. Left heart catheterization 5/26/22: Left coronary artery: Left main is normal. Left main gives rise to LAD, left circumflex.  LAD shows luminal irregularities without any lesion. Left circumflex is a large nondominant vessel, appears to be normal. Right coronary artery could not be engaged. 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 LEFT VENTRICULOGRAPHY: Shows severely reduced. It shows dilated left ventricle. Severely reduced LV systolic function with ejection fraction less than 20%. Echocardiogram on 6/15/22 shows a left ventricular end-diastolic dimension of 63 mm, walls 8 mm, severe global Left ventricular hypokinesis with exception of basal posterolateral wall which appears to be only mildly hypokinetic with a left ventricular ejection fraction of 17%, and grade 2 diastolic dysfunction. 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 The left atrium is dilated and the right atrium is dilated. The aortic valve is trileaflet without evidence of stenosis; there is trace insufficiency. The mitral valve is morphologically normal with moderate regurgitation. There is mild to moderate tricuspid regurgitation. Pulmonary systolic pressure is estimated to be 50-55 mmHg above right atrial pressure (15 mmHg). There is trivial pulmonic insufficiency. A small posterior/posterolateral pericardial effusion is present without tamponade physiology. The IVC is dilated with < 50% respiratory variance consistent with significantly elevated RA pressure of 15 mmHg. Right heart catheterization on 6/30/22 (on vent with FIO2 0.55) showed a RA pressure of 8 mmHg, RV pressure of 39/9 mmHg, PA pressure of 41/19/26 mmHg, and a PW pressure of 8 mmHg. PA saturation was 66%. Cardiac output by Harsha calculation was 6.4 L/min with a corresponding index of 3.3 L/min/m2. Cardiac output by thermodilution was 3.87 L/min with a corresponding index of 1.98 L/min/m2. PVR is 4.7 CROSS (using thermodilution CO).      Most recent HS troponins:  No results for input(s): TROPHS in the last 72 hours. No lab exists for component:  CKMB  ECG: SR, LBBB    Review of Systems    []All other systems reviewed and all negative except as written in HPI    [] Patient unable to provide secondary to condition         No past medical history on file. No past surgical history on file. Social Hx:    Family Hx: family history is not on file. Allergies   Allergen Reactions    Tape [Adhesive] Rash          OBJECTIVE:  Wt Readings from Last 3 Encounters:   08/23/22 63.7 kg (140 lb 6.9 oz)       Intake/Output Summary (Last 24 hours) at 8/23/2022 0720  Last data filed at 8/23/2022 0300  Gross per 24 hour   Intake 710 ml   Output 2725 ml   Net -2015 ml         Physical Exam    Vitals:   Vitals:    08/23/22 0400 08/23/22 0500 08/23/22 0600 08/23/22 0700   BP: 127/76 117/71 105/73 114/87   Pulse: (!) 111 (!) 111 (!) 104 (!) 113   Resp: 20 29 16 22   Temp: 98.9 °F (37.2 °C)      TempSrc:       SpO2: 100%      Weight:       Height:               General:    Alert, cooperative, no distress. Neck:   Supple, no carotid bruit and no JVD. Back:     Symmetric    Lungs:      Clear to auscultation bilaterally. Heart[de-identified]    Regular rate and rhythm, S1, S2 normal, no murmur, click, rub or gallop. Abdomen:     PEG tube in place, not distended, suprapubic urinary cath   Extremities:   Extremities normal, atraumatic, no cyanosis, +leg edema. Vascular:   Pulses -    Skin:   Skin color normal. No rashes or lesions on visible areas   Neurologic:   Alert       Data Review:     Radiology:   XR Results (most recent):  Results from Hospital Encounter encounter on 08/08/22    XR CHEST PORT    Narrative  Indication: Follow-up abnormal chest x-ray    Comparison to 8/21/2022. Portable exam obtained at 458 demonstrates little  change in the small bilateral pleural effusions and diffuse bilateral pulmonary  infiltrates compared to prior examination.     Impression  Stable bilateral pulmonary infiltrates and small bilateral pleural  effusions. CT Results (most recent):  No results found for this or any previous visit. MRI Results (most recent):  No results found for this or any previous visit. No results for input(s): CPK, TROIQ in the last 72 hours. No lab exists for component: CKQMB, CPKMB, BMPP  Recent Labs     08/23/22  0438 08/22/22  0147    137   K 3.5 2.9*    104   CO2 29 27   BUN 66* 59*   CREA 2.17* 2.23*   * 174*   PHOS  --  2.4*   CA 8.5 8.5     Recent Labs     08/23/22  0438 08/22/22  0147   WBC 7.0 6.7   HGB 8.1* 8.2*   HCT 25.3* 25.1*    338     Recent Labs     08/23/22  0438 08/21/22  0418   * 233*     No results for input(s): CHOL, LDLC in the last 72 hours. No lab exists for component: TGL, HDLC,  HBA1C  No results for input(s): CRP, TSH, TSHEXT in the last 72 hours.     No lab exists for component: ESR        Current meds:    Current Facility-Administered Medications:     peppermint spirit solution, , Other, PRN, Elyse Ball MD, Given at 08/22/22 1320    bumetanide (BUMEX) injection 1 mg, 1 mg, IntraVENous, BID, Jada Skaggs MD, 1 mg at 08/22/22 1832    midodrine (PROAMATINE) tablet 10 mg, 10 mg, Oral, DAILY PRN, Aydee Sarna, DO    albumin human 25% (BUMINATE) solution 12.5 g, 12.5 g, IntraVENous, DAILY PRN, Aydee Sarna, DO    midodrine (PROAMATINE) tablet 20 mg, 20 mg, Per Kelsey Spears, Q8H, Elyse Ball MD, 20 mg at 08/23/22 0600    epoetin darius-epbx (RETACRIT) 14,000 Units combo injection, 14,000 Units, SubCUTAneous, Q7D, Domenico Wan MD, 14,000 Units at 08/18/22 2309    HYDROcodone-acetaminophen (NORCO)  mg tablet 1 Tablet, 1 Tablet, Oral, Q6H PRN, Armando Sanches MD, 1 Tablet at 08/22/22 0141    HYDROmorphone (DILAUDID) injection 0.5 mg, 0.5 mg, IntraVENous, Q6H PRN, Yoselin MORRISSEY MD, 0.5 mg at 08/21/22 1443    0.9% sodium chloride infusion 250 mL, 250 mL, IntraVENous, PRN, Yoselin Coronado MD YUNI, Last Rate: 10 mL/hr at 08/14/22 1900, Rate Change at 08/14/22 1900    collagenase (SANTYL) 250 unit/gram ointment, , Topical, DAILY, Ni Salinas MD, Given at 08/22/22 0900    alteplase (CATHFLO) 1 mg in sterile water (preservative free) 1 mL injection, 1 mg, InterCATHeter, PRN, Johnnie Nguyen MD, 1 mg at 08/21/22 2038    [Held by provider] apixaban (ELIQUIS) tablet 5 mg, 5 mg, Oral, Q12H, Rochelle Miser, NP, 5 mg at 08/22/22 0434    atorvastatin (LIPITOR) tablet 40 mg, 40 mg, Oral, QHS, Rochelle Miser, NP, 40 mg at 08/22/22 2118    fludrocortisone (FLORINEF) tablet 0.1 mg, 0.1 mg, Oral, DAILY, Rochelle Miser, NP, 0.1 mg at 08/22/22 0935    insulin glargine (LANTUS) injection 5 Units, 5 Units, SubCUTAneous, DAILY, Rochelle Miser, NP, 5 Units at 08/22/22 0935    [Held by provider] sacubitriL-valsartan (ENTRESTO) 24-26 mg tablet 0.5 Tablet, 0.5 Tablet, Oral, Q12H, Rochelle Miser, NP    ferrous sulfate 300 mg (60 mg iron)/5 mL oral syrup 300 mg, 300 mg, Oral, TID WITH MEALS, Rochelle Miser, NP, 300 mg at 08/22/22 1832    mexiletine (MEXITIL) capsule 150 mg, 150 mg, Oral, Q12H, Rochelle Miser, NP, 150 mg at 08/23/22 0600    cholestyramine-aspartame (QUESTRAN LIGHT) packet 4 g, 4 g, Oral, TID WITH MEALS, Rochelle Miser, NP, 4 g at 08/22/22 1832    levothyroxine (SYNTHROID) tablet 50 mcg, 50 mcg, Oral, 6am, Rochelle Miser, NP, 50 mcg at 08/23/22 0600    albuterol-ipratropium (DUO-NEB) 2.5 MG-0.5 MG/3 ML, 3 mL, Nebulization, Q4H PRN, Rochelle Sampson, NP    heparin (porcine) 1,000 unit/mL injection 1,400 Units, 1,400 Units, InterCATHeter, DIALYSIS PRN, 1,400 Units at 08/17/22 1429 **AND** heparin (porcine) 1,000 unit/mL injection 1,400 Units, 1,400 Units, InterCATHeter, DIALYSIS PRN, Yoel MORRISSEY MD, 1,400 Units at 08/17/22 1428    sodium chloride (NS) flush 5-40 mL, 5-40 mL, IntraVENous, Q8H, Rochelle Sampson, NP, 10 mL at 08/23/22 0600    sodium chloride (NS) flush 5-40 mL, 5-40 mL, IntraVENous, PRN, Rochelle Sampson, NP    acetaminophen (TYLENOL) tablet 650 mg, 650 mg, Oral, Q6H PRN **OR** acetaminophen (TYLENOL) suppository 650 mg, 650 mg, Rectal, Q6H PRN, Claudell Donath, NP    polyethylene glycol (MIRALAX) packet 17 g, 17 g, Oral, DAILY PRN, Claudell Donath, NP, 17 g at 08/12/22 7334    ondansetron (ZOFRAN ODT) tablet 4 mg, 4 mg, Oral, Q8H PRN **OR** ondansetron (ZOFRAN) injection 4 mg, 4 mg, IntraVENous, Q6H PRN, Claudell Donath, NP, 4 mg at 08/17/22 1347    chlorhexidine (PERIDEX) 0.12 % mouthwash 15 mL, 15 mL, Oral, Q12H, Claudell Donath, NP, 15 mL at 08/22/22 4108    Andria Goodman NP  [facility] [address] [place] [address] [facility] [address] [address] [place] [address]  [phone]      Gilford Reason, MD

## 2022-08-23 NOTE — PROGRESS NOTES
0730 Bedside and Verbal shift change report given to Juan J Cat (oncoming nurse) by Rikki Melo (offgoing nurse). Report included the following information SBAR, Kardex, Procedure Summary, Intake/Output, MAR, Accordion, and Recent Results. 1400 Consent obtained for colostomy tomorrow with Dr. Addi Chawla

## 2022-08-23 NOTE — PROGRESS NOTES
NUTRITION brief  Recommendations:     Modify tube feeding: Nepro @ 60 ml/hr x 21 hr with 50 ml water flush q 4 hr. Diet: Regular, Chocolate milk all meals  Nutrition Support: Nocturnal tube feeding: Nepro @ 75 ml/hr x 13 hr with 50 ml water flush q 4 hr during feeding and 1 packet Prosource daily  Nutritionally Significant Medication: Bumex bid, Synthroid    New events impacting nutrition plan of care:   Per calorie count and documented PO intake will transition EN back to full support. PO intake inconsistent therefore not likely to meet energy needs. Note plan for diverting colostomy tomorrow if cleared by cardiology. Modify tube feeding above goal. This will provide 1260 ml, 2200 calories, 100 gm protein and 1200 ml free water (tube feeding/flush) per day to meet estimated needs. Meal Intake:   Patient Vitals for the past 168 hrs:   % Diet Eaten   08/22/22 0831 51 - 75%   08/21/22 1700 0%   08/21/22 1200 0%   08/21/22 0800 1 - 25%   08/18/22 1000 1 - 25%         See full RD assessment from 8/19 for additional details, goals, and monitoring/evaluation.    Estimated Nutrition Needs:   Energy: 5024-5883 (35-40 kcal/kg)  Wt used: Current (64 kg)  Protein:  (1.4-1.6g/kg)  Wt used: Admission   Fluid:  1 ml/kcal    Jon Rehman RD [facility]

## 2022-08-23 NOTE — PROGRESS NOTES
CRITICAL CARE NOTE      Name: Adelso Nam   : 1980   MRN: [id number]   Date: 2022      REASON FOR ICU ADMISSION:  Acute renal failure, chronic vent dependence, septic shock     PRINCIPAL ICU DIAGNOSIS   Septic shock, unclear etiology  Acute renal injury  Chronic hypoxic respiratory failure, s/p trach, vent dependent  Chronic decubitus pressure injury ulcers, present on admission  HFrEF (20-25%)  Afib  Chronic hypotension  Paraplegia    BRIEF PATIENT SUMMARY   47-year-old male with known past medical history of multiple chronic issues as listed in problem list, who presented to [facility] ED after being [facility] for possible need for CRRT for YADY due to inability to tolerate iHD due to sepsis/septic shock from unclear source. COMPREHENSIVE ASSESSMENT & PLAN:SYSTEM BASED     24 HOUR EVENTS:   TC during day, AC overnight. Pending diverting ostomy. NEUROLOGICAL:   -Awake and alert in no acute distress  -history of paraplegia  - PT/OT SLP consulted  - Dysphagia with GJ tube in place, however cleared for PO by SLP    PULMONOLOGY:   -Chronic respiratory failure with trach in place ( placed 2022)  -Vent wean as tolerated. -PRN nebs/suctioning  -ABCDEF protocol    CARDIOVASCULAR:     - Atrial fibrillation on eliquis  -Non-ischemic cardiomyopathy with EF 15-20%  -Recent PEA arrest (multiple)  -Pulmonary hypertension with right ventricular dysfunction  -Continue midodrine, florinef, mexiletine. PRN midodrine 10mg pre-HD  - holding entresto due to hypotension  -TTE ordered for preop work-up diverting colostomy next week-shows LVEF of 15 to 20%. -Hold Eliquis for 24 hours before surgery. - Cardiology pre-op eval today for OR    GASTROINTESTINAL     -Likely cyclic tube feeds as the patient is eating.  - calorie count <50% daily needs, begin full TF. Allow PO intake as tolerated. RENAL/ELECTROLYTE/FLUIDS:     -Last dialysis on .   No acute needs for renal placement therapy.   -Trend lytes and replete as needed  -Florinef, midodrine, EPO  -Suprapubic catheter care    ENDOCRINE:   -Continue synthroid    HEMATOLOGY/ONCOLOGY:   -Eliquis on hold for surgery 8/24    INFECTIOUS DISEASE:   S/p Vanc/Zosyn for 10 days. Wound cultures polymicrobial with light MRSA and MDR organisms. Respiratory cultures growing light Proteus mirabilis and achromobacter. Wound Vac placed today. Plan for diverting ostomy 8/24 with general surgery for wound healing. ICU DAILY CHECKLIST     Code Status:Partial (No Shocks, No Compressions) Meds okay  DVT Prophylaxis:Eliquis  T/L/D: Ye, GJ tube, Colostomy, suprapubic catheter, PIV  SUP: None  Diet: Tube Feed,PO as tolerated  Activity Level:Paraplegia  ABCDEF Bundle/Checklist Completed:Yes  Disposition: Stay in ICU. Initiate discharge planning. Multidisciplinary Rounds Completed: Yes  Patient/Family Updated: Yes       HOSPITAL COURSE/DAILY EVENT LOG   8/ pan Cx started on Vanc/Zosyn  8/9 started n CRRT levophed    SUBJECTIVE   Review of Systems   Constitutional:  Negative for chills, diaphoresis and fever. HENT:  Negative for congestion, ear pain, hearing loss, nosebleeds, sinus pain and sore throat. Eyes:  Negative for blurred vision, double vision and pain. Respiratory:  Negative for cough, sputum production, shortness of breath and wheezing. Cardiovascular:  Negative for chest pain, palpitations and orthopnea. Gastrointestinal:  Negative for abdominal pain, constipation, heartburn, nausea and vomiting. Genitourinary:  Negative for hematuria. Musculoskeletal:  Negative for falls, joint pain and myalgias. Skin:  Negative for itching and rash. Neurological:  Negative for dizziness, tremors, speech change, focal weakness, weakness and headaches. Endo/Heme/Allergies:  Does not bruise/bleed easily. Psychiatric/Behavioral:  Negative for depression.        OBJECTIVE     Labs and Data: Reviewed 08/23/22  Medications: Reviewed 08/23/22  Imaging: Reviewed 22    Physical Exam  Vitals and nursing note reviewed. Constitutional:       General: He is awake. He is not in acute distress. Appearance: He is underweight. HENT:      Head: Normocephalic and atraumatic. Nose: Nose normal.      Mouth/Throat:      Comments: trach  Eyes:      General: Lids are normal.      Conjunctiva/sclera: Conjunctivae normal.      Pupils: Pupils are equal, round, and reactive to light. Neck:     Cardiovascular:      Rate and Rhythm: Normal rate. Rhythm irregularly irregular. Heart sounds: Normal heart sounds, S1 normal and S2 normal. No murmur heard. No friction rub. No gallop. Pulmonary:      Effort: Pulmonary effort is normal. No tachypnea. Breath sounds: Normal breath sounds and air entry. Comments: On vent  Abdominal:      General: The ostomy site is clean. Bowel sounds are normal.       Musculoskeletal:      Right lower leg: No edema. Left lower leg: No edema. Comments: Chronic numerous pressure ulcer wounds throughout, present on admission see wound care note for further details   Skin:     General: Skin is warm and dry. Capillary Refill: Capillary refill takes 2 to 3 seconds. Coloration: Skin is pale. Findings: Wound present. Neurological:      Mental Status: He is alert, oriented to person, place, and time and easily aroused. Mental status is at baseline. GCS: GCS eye subscore is 4. GCS verbal subscore is 5. GCS motor subscore is 6. Psychiatric:         Behavior: Behavior is cooperative.         Visit Vitals  BP 98/72   Pulse (!) 113   Temp 98.9 °F (37.2 °C)   Resp 18   Ht 5' 8\" (1.727 m)   Wt 63.7 kg (140 lb 6.9 oz)   SpO2 93%   BMI 21.35 kg/m²    O2 Flow Rate (L/min): 10 l/min O2 Device: Tracheal collar Temp (24hrs), Av.8 °F (37.1 °C), Min:98.5 °F (36.9 °C), Max:98.9 °F (37.2 °C)           Intake/Output:     Intake/Output Summary (Last 24 hours) at 2022 1100  Last data filed at 2022 1078  Gross per 24 hour   Intake 710 ml   Output 2975 ml   Net -2265 ml         Imaging       Pertinent imaging reviewed and interpreted independently as noted above    CRITICAL CARE DOCUMENTATION  I had a face to face encounter with the patient, reviewed and interpreted patient data including clinical events, labs, images, vital signs, I/O's, and examined patient. I have discussed the case and the plan and management of the patient's care with the consulting services, the bedside nurses and the respiratory therapist.      NOTE OF PERSONAL INVOLVEMENT IN CARE   This patient has a high probability of imminent, clinically significant deterioration, which requires the highest level of preparedness to intervene urgently. I participated in the decision-making and personally managed or directed the management of the following life and organ supporting interventions that required my frequent assessment to treat or prevent imminent deterioration. I personally spent 40 minutes of critical care time. This is time spent at this critically ill patient's bedside actively involved in patient care as well as the coordination of care. This does not include any procedural time which has been billed separately.     Deedee Becker DO  Staff Intensivist  [facility]  8/23/2022

## 2022-08-23 NOTE — PROGRESS NOTES
08/23/22 1016   Oxygen Therapy   O2 Sat (%) 93 %   Pulse via Oximetry 123 beats per minute   O2 Device Tracheal collar   O2 Flow Rate (L/min) 10 l/min   FIO2 (%) 50 %   Pre-Treatment   Breathing Pattern Tachypneic   Cough Cough with suction;Productive   Sputum amount Moderate   Sputum color/odor White   Sputum consistency Thick   Sputum method obtained Tracheal   Respirations 32

## 2022-08-24 ENCOUNTER — ANESTHESIA EVENT (OUTPATIENT)
Dept: SURGERY | Age: 42
DRG: 853 | End: 2022-08-24
Payer: MEDICARE

## 2022-08-24 ENCOUNTER — ANESTHESIA (OUTPATIENT)
Dept: SURGERY | Age: 42
DRG: 853 | End: 2022-08-24
Payer: MEDICARE

## 2022-08-24 LAB
ALBUMIN SERPL-MCNC: 2.1 G/DL (ref 3.5–5)
ALBUMIN/GLOB SERPL: 0.4 {RATIO} (ref 1.1–2.2)
ALP SERPL-CCNC: 165 U/L (ref 45–117)
ALT SERPL-CCNC: 68 U/L (ref 12–78)
ANION GAP SERPL CALC-SCNC: 7 MMOL/L (ref 5–15)
AST SERPL-CCNC: 42 U/L (ref 15–37)
BASOPHILS # BLD: 0 K/UL (ref 0–0.1)
BASOPHILS NFR BLD: 1 % (ref 0–1)
BILIRUB SERPL-MCNC: 0.3 MG/DL (ref 0.2–1)
BUN SERPL-MCNC: 74 MG/DL (ref 6–20)
BUN/CREAT SERPL: 36 (ref 12–20)
CALCIUM SERPL-MCNC: 8.9 MG/DL (ref 8.5–10.1)
CHLORIDE SERPL-SCNC: 104 MMOL/L (ref 97–108)
CO2 SERPL-SCNC: 29 MMOL/L (ref 21–32)
CREAT SERPL-MCNC: 2.07 MG/DL (ref 0.7–1.3)
DIFFERENTIAL METHOD BLD: ABNORMAL
EOSINOPHIL # BLD: 0.2 K/UL (ref 0–0.4)
EOSINOPHIL NFR BLD: 3 % (ref 0–7)
ERYTHROCYTE [DISTWIDTH] IN BLOOD BY AUTOMATED COUNT: 16.5 % (ref 11.5–14.5)
GLOBULIN SER CALC-MCNC: 5.1 G/DL (ref 2–4)
GLUCOSE SERPL-MCNC: 121 MG/DL (ref 65–100)
HCT VFR BLD AUTO: 24.9 % (ref 36.6–50.3)
HGB BLD-MCNC: 8 G/DL (ref 12.1–17)
IMM GRANULOCYTES # BLD AUTO: 0 K/UL (ref 0–0.04)
IMM GRANULOCYTES NFR BLD AUTO: 1 % (ref 0–0.5)
LYMPHOCYTES # BLD: 0.9 K/UL (ref 0.8–3.5)
LYMPHOCYTES NFR BLD: 14 % (ref 12–49)
MAGNESIUM SERPL-MCNC: 2.4 MG/DL (ref 1.6–2.4)
MCH RBC QN AUTO: 28.7 PG (ref 26–34)
MCHC RBC AUTO-ENTMCNC: 32.1 G/DL (ref 30–36.5)
MCV RBC AUTO: 89.2 FL (ref 80–99)
MONOCYTES # BLD: 1.1 K/UL (ref 0–1)
MONOCYTES NFR BLD: 16 % (ref 5–13)
NEUTS SEG # BLD: 4.3 K/UL (ref 1.8–8)
NEUTS SEG NFR BLD: 65 % (ref 32–75)
NRBC # BLD: 0 K/UL (ref 0–0.01)
NRBC BLD-RTO: 0 PER 100 WBC
PLATELET # BLD AUTO: 353 K/UL (ref 150–400)
PMV BLD AUTO: 10.6 FL (ref 8.9–12.9)
POTASSIUM SERPL-SCNC: 3 MMOL/L (ref 3.5–5.1)
PROT SERPL-MCNC: 7.2 G/DL (ref 6.4–8.2)
RBC # BLD AUTO: 2.79 M/UL (ref 4.1–5.7)
SODIUM SERPL-SCNC: 140 MMOL/L (ref 136–145)
WBC # BLD AUTO: 6.5 K/UL (ref 4.1–11.1)

## 2022-08-24 PROCEDURE — 77030008608 HC TRCR ENDOSC SMTH AMR -B: Performed by: SURGERY

## 2022-08-24 PROCEDURE — 77030040361 HC SLV COMPR DVT MDII -B: Performed by: SURGERY

## 2022-08-24 PROCEDURE — 0DBN4ZZ EXCISION OF SIGMOID COLON, PERCUTANEOUS ENDOSCOPIC APPROACH: ICD-10-PCS | Performed by: SURGERY

## 2022-08-24 PROCEDURE — 74011000250 HC RX REV CODE- 250: Performed by: STUDENT IN AN ORGANIZED HEALTH CARE EDUCATION/TRAINING PROGRAM

## 2022-08-24 PROCEDURE — 83735 ASSAY OF MAGNESIUM: CPT

## 2022-08-24 PROCEDURE — 77030019702 HC WRP THER MENM -C: Performed by: SURGERY

## 2022-08-24 PROCEDURE — 74011250636 HC RX REV CODE- 250/636: Performed by: STUDENT IN AN ORGANIZED HEALTH CARE EDUCATION/TRAINING PROGRAM

## 2022-08-24 PROCEDURE — 44206 LAP PART COLECTOMY W/STOMA: CPT | Performed by: SURGERY

## 2022-08-24 PROCEDURE — 77030039895 HC SYST SMK EVAC LAP COVD -B: Performed by: SURGERY

## 2022-08-24 PROCEDURE — 44206 LAP PART COLECTOMY W/STOMA: CPT | Performed by: PHYSICIAN ASSISTANT

## 2022-08-24 PROCEDURE — 74011000250 HC RX REV CODE- 250: Performed by: INTERNAL MEDICINE

## 2022-08-24 PROCEDURE — 0D1N4Z4 BYPASS SIGMOID COLON TO CUTANEOUS, PERCUTANEOUS ENDOSCOPIC APPROACH: ICD-10-PCS | Performed by: SURGERY

## 2022-08-24 PROCEDURE — 74011250636 HC RX REV CODE- 250/636: Performed by: INTERNAL MEDICINE

## 2022-08-24 PROCEDURE — 76060000035 HC ANESTHESIA 2 TO 2.5 HR: Performed by: SURGERY

## 2022-08-24 PROCEDURE — 80053 COMPREHEN METABOLIC PANEL: CPT

## 2022-08-24 PROCEDURE — 03HY32Z INSERTION OF MONITORING DEVICE INTO UPPER ARTERY, PERCUTANEOUS APPROACH: ICD-10-PCS | Performed by: SURGERY

## 2022-08-24 PROCEDURE — 77030020829: Performed by: SURGERY

## 2022-08-24 PROCEDURE — 88307 TISSUE EXAM BY PATHOLOGIST: CPT

## 2022-08-24 PROCEDURE — 65620000000 HC RM CCU GENERAL

## 2022-08-24 PROCEDURE — 77030040955 HC DSCTR SONICISION MEDT -H1: Performed by: SURGERY

## 2022-08-24 PROCEDURE — 77030012770 HC TRCR OPT FX AMR -B: Performed by: SURGERY

## 2022-08-24 PROCEDURE — 2709999900 HC NON-CHARGEABLE SUPPLY: Performed by: SURGERY

## 2022-08-24 PROCEDURE — 76010000153 HC OR TIME 1.5 TO 2 HR: Performed by: SURGERY

## 2022-08-24 PROCEDURE — 77030011808 HC STPLR ENDOSCOPIC J&J -D: Performed by: SURGERY

## 2022-08-24 PROCEDURE — 77030002895 HC DEV VASC CLOSR COVD -B: Performed by: SURGERY

## 2022-08-24 PROCEDURE — 77030036732 HC RELD STPLR VASC J&J -F: Performed by: SURGERY

## 2022-08-24 PROCEDURE — 74011000250 HC RX REV CODE- 250: Performed by: SURGERY

## 2022-08-24 PROCEDURE — 74011250637 HC RX REV CODE- 250/637: Performed by: NURSE PRACTITIONER

## 2022-08-24 PROCEDURE — 94003 VENT MGMT INPAT SUBQ DAY: CPT

## 2022-08-24 PROCEDURE — P9045 ALBUMIN (HUMAN), 5%, 250 ML: HCPCS | Performed by: STUDENT IN AN ORGANIZED HEALTH CARE EDUCATION/TRAINING PROGRAM

## 2022-08-24 PROCEDURE — 51798 US URINE CAPACITY MEASURE: CPT

## 2022-08-24 PROCEDURE — 36415 COLL VENOUS BLD VENIPUNCTURE: CPT

## 2022-08-24 PROCEDURE — 74011250637 HC RX REV CODE- 250/637: Performed by: INTERNAL MEDICINE

## 2022-08-24 PROCEDURE — 74011636637 HC RX REV CODE- 636/637: Performed by: NURSE PRACTITIONER

## 2022-08-24 PROCEDURE — 85025 COMPLETE CBC W/AUTO DIFF WBC: CPT

## 2022-08-24 PROCEDURE — 74011000250 HC RX REV CODE- 250: Performed by: NURSE PRACTITIONER

## 2022-08-24 PROCEDURE — 74011250637 HC RX REV CODE- 250/637: Performed by: STUDENT IN AN ORGANIZED HEALTH CARE EDUCATION/TRAINING PROGRAM

## 2022-08-24 RX ORDER — BUMETANIDE 0.25 MG/ML
0.5 INJECTION INTRAMUSCULAR; INTRAVENOUS EVERY 12 HOURS
Status: DISCONTINUED | OUTPATIENT
Start: 2022-08-24 | End: 2022-08-27

## 2022-08-24 RX ORDER — NEOSTIGMINE METHYLSULFATE 1 MG/ML
INJECTION, SOLUTION INTRAVENOUS AS NEEDED
Status: DISCONTINUED | OUTPATIENT
Start: 2022-08-24 | End: 2022-08-24 | Stop reason: HOSPADM

## 2022-08-24 RX ORDER — POTASSIUM CHLORIDE 7.45 MG/ML
10 INJECTION INTRAVENOUS
Status: COMPLETED | OUTPATIENT
Start: 2022-08-24 | End: 2022-08-24

## 2022-08-24 RX ORDER — ROCURONIUM BROMIDE 10 MG/ML
INJECTION, SOLUTION INTRAVENOUS AS NEEDED
Status: DISCONTINUED | OUTPATIENT
Start: 2022-08-24 | End: 2022-08-24 | Stop reason: HOSPADM

## 2022-08-24 RX ORDER — KETAMINE HYDROCHLORIDE 10 MG/ML
INJECTION, SOLUTION INTRAMUSCULAR; INTRAVENOUS AS NEEDED
Status: DISCONTINUED | OUTPATIENT
Start: 2022-08-24 | End: 2022-08-24 | Stop reason: HOSPADM

## 2022-08-24 RX ORDER — PHENYLEPHRINE HCL IN 0.9% NACL 0.4MG/10ML
SYRINGE (ML) INTRAVENOUS AS NEEDED
Status: DISCONTINUED | OUTPATIENT
Start: 2022-08-24 | End: 2022-08-24 | Stop reason: HOSPADM

## 2022-08-24 RX ORDER — GLYCOPYRROLATE 0.2 MG/ML
INJECTION INTRAMUSCULAR; INTRAVENOUS AS NEEDED
Status: DISCONTINUED | OUTPATIENT
Start: 2022-08-24 | End: 2022-08-24 | Stop reason: HOSPADM

## 2022-08-24 RX ORDER — ALBUMIN HUMAN 50 G/1000ML
12.5 SOLUTION INTRAVENOUS ONCE
Status: COMPLETED | OUTPATIENT
Start: 2022-08-24 | End: 2022-08-24

## 2022-08-24 RX ADMIN — ROCURONIUM BROMIDE 20 MG: 10 SOLUTION INTRAVENOUS at 14:05

## 2022-08-24 RX ADMIN — MINERAL SUPPLEMENT IRON 300 MG / 5 ML STRENGTH LIQUID 100 PER BOX UNFLAVORED 300 MG: at 17:20

## 2022-08-24 RX ADMIN — Medication 10 ML: at 05:16

## 2022-08-24 RX ADMIN — MIDODRINE HYDROCHLORIDE 20 MG: 5 TABLET ORAL at 17:20

## 2022-08-24 RX ADMIN — CHOLESTYRAMINE 4 G: 4 POWDER, FOR SUSPENSION ORAL at 08:47

## 2022-08-24 RX ADMIN — Medication 10 MG: at 14:07

## 2022-08-24 RX ADMIN — Medication 20 MG: at 13:36

## 2022-08-24 RX ADMIN — BUMETANIDE 1 MG: 0.25 INJECTION, SOLUTION INTRAMUSCULAR; INTRAVENOUS at 08:47

## 2022-08-24 RX ADMIN — MEXILETINE HYDROCHLORIDE 150 MG: 150 CAPSULE ORAL at 17:20

## 2022-08-24 RX ADMIN — Medication 40 MCG: at 13:50

## 2022-08-24 RX ADMIN — MEXILETINE HYDROCHLORIDE 150 MG: 150 CAPSULE ORAL at 05:12

## 2022-08-24 RX ADMIN — FLUDROCORTISONE ACETATE 0.1 MG: 0.1 TABLET ORAL at 08:47

## 2022-08-24 RX ADMIN — LEVOTHYROXINE SODIUM 50 MCG: 0.05 TABLET ORAL at 05:12

## 2022-08-24 RX ADMIN — MINERAL SUPPLEMENT IRON 300 MG / 5 ML STRENGTH LIQUID 100 PER BOX UNFLAVORED 300 MG: at 08:46

## 2022-08-24 RX ADMIN — EPINEPHRINE 1 MCG/MIN: 1 INJECTION INTRAMUSCULAR; INTRAVENOUS; SUBCUTANEOUS at 14:00

## 2022-08-24 RX ADMIN — Medication 40 MCG: at 13:52

## 2022-08-24 RX ADMIN — COLLAGENASE SANTYL: 250 OINTMENT TOPICAL at 08:48

## 2022-08-24 RX ADMIN — Medication 10 ML: at 21:25

## 2022-08-24 RX ADMIN — CHLORHEXIDINE GLUCONATE 15 ML: 1.2 RINSE ORAL at 21:25

## 2022-08-24 RX ADMIN — QUETIAPINE FUMARATE 50 MG: 25 TABLET ORAL at 08:47

## 2022-08-24 RX ADMIN — CHLORHEXIDINE GLUCONATE 15 ML: 1.2 RINSE ORAL at 08:49

## 2022-08-24 RX ADMIN — ALBUMIN (HUMAN) 12.5 G: 12.5 INJECTION, SOLUTION INTRAVENOUS at 17:15

## 2022-08-24 RX ADMIN — CHOLESTYRAMINE 4 G: 4 POWDER, FOR SUSPENSION ORAL at 17:43

## 2022-08-24 RX ADMIN — NEOSTIGMINE METHYLSULFATE 1 MG: 1 INJECTION, SOLUTION INTRAVENOUS at 14:57

## 2022-08-24 RX ADMIN — MIDODRINE HYDROCHLORIDE 20 MG: 5 TABLET ORAL at 05:12

## 2022-08-24 RX ADMIN — PHENYLEPHRINE HYDROCHLORIDE 40 MCG/MIN: 10 INJECTION INTRAVENOUS at 13:41

## 2022-08-24 RX ADMIN — POTASSIUM CHLORIDE 10 MEQ: 7.46 INJECTION, SOLUTION INTRAVENOUS at 11:22

## 2022-08-24 RX ADMIN — Medication 20 ML: at 17:23

## 2022-08-24 RX ADMIN — POTASSIUM CHLORIDE 10 MEQ: 7.46 INJECTION, SOLUTION INTRAVENOUS at 12:35

## 2022-08-24 RX ADMIN — MIDODRINE HYDROCHLORIDE 20 MG: 5 TABLET ORAL at 21:25

## 2022-08-24 RX ADMIN — POTASSIUM CHLORIDE 10 MEQ: 7.46 INJECTION, SOLUTION INTRAVENOUS at 14:15

## 2022-08-24 RX ADMIN — GLYCOPYRROLATE 0.2 MG: 0.2 INJECTION, SOLUTION INTRAMUSCULAR; INTRAVENOUS at 14:57

## 2022-08-24 RX ADMIN — PHENYLEPHRINE HYDROCHLORIDE 60 MCG/MIN: 10 INJECTION INTRAVENOUS at 17:15

## 2022-08-24 RX ADMIN — POTASSIUM CHLORIDE 10 MEQ: 7.46 INJECTION, SOLUTION INTRAVENOUS at 12:01

## 2022-08-24 RX ADMIN — ATORVASTATIN CALCIUM 40 MG: 40 TABLET, FILM COATED ORAL at 21:26

## 2022-08-24 RX ADMIN — QUETIAPINE FUMARATE 50 MG: 25 TABLET ORAL at 21:26

## 2022-08-24 RX ADMIN — Medication 80 MCG: at 13:41

## 2022-08-24 RX ADMIN — SODIUM CHLORIDE: 9 INJECTION, SOLUTION INTRAVENOUS at 12:35

## 2022-08-24 NOTE — PROGRESS NOTES
Name: Thu Whitman MRN: [id number]   : 1980 [facility]: [facility]   Date: 2022        IMPRESSION:   YADY, started on iHD initially. patient became hypotensive and was switched to CRRT. --off CRRT on IHD, urine output increasing, Bp stable. Pulled his Ye out -off HD since   Hypotension resolved, off iv pressor, on midodrine  Hypophosphatemia- resolved  Debility with multiple pressure ulcers  Respiratory failure - on vent, weaning  Anemia of chronic disease-   Hyperkalemia - resolved with RRT  Protein deficiency  Wounds  Hypokalemia       PLAN:   S/p HD , no need for HD, Creatinine is stable , good urine output  Decrease bumex iv  Replete potassium, ordered  Follow lytes. Midodrine 20 mg tid  Prior to starting dialysis patient's Scr was 0.8-   Epo increased to 14K/week, po Iron  Tube feeding nepro  Dose meds for his GFR. Avoid NSAIDs + IV contrast.  Abx per ICU team     Subjective/Interval History:   I have reviewed the flowsheet and previous days notes. Seen on CRRT, awake and alert, denies pain/SOB with nodding head      F/U - YADY , CRRT --> 2022    Remains on CRRT + pressors. No new complaints were offered.  seen on CRRT, d/w ICU RN  8- CRRT stopped, BP stable on minimal Levophed, getting NeutraPhos  8/15 awake and alert, on vent. BP stable, had 175 ml UO yesterday, 150 ml today sofar. Cr 1.1. will add bumex if BP toleerates, no HD today  , awake on vent. UO has increased had 1 lit yesterday with bumex, cr increasing. BP stable, will hold HD today again   sleeping on vent, arousable, BP stable in 120s, had 1100 ml u/o and 500 overnight. Cr higher  , awake, off vent on trach collar, pulled his ye this am, UO up       - F/U - YADY, Hx of HD --> 22    No new complaints. 22 - F/U - Yady , Hx of HD --> 22    No complaints. 22 - F/U YADY , Hx of HD    Unable to obtain the ROS.       22 --> F/U YADY, Hx of HD  No new complaints.  awake and alert, on trach collar, good uo, Cr stable   Pt seen and examined , back on vent, cr improving, BUN higher    Objective:   Vital Signs:    Visit Vitals  /72 (BP 1 Location: Left arm, BP Patient Position: At rest)   Pulse 92   Temp 98.5 °F (36.9 °C)   Resp 18   Ht 5' 8\" (1.727 m)   Wt 64.2 kg (141 lb 8.6 oz)   SpO2 100%   BMI 21.52 kg/m²       O2 Device: Tracheostomy   O2 Flow Rate (L/min): 10 l/min   Temp (24hrs), Av.7 °F (36.5 °C), Min:97.1 °F (36.2 °C), Max:98.5 °F (36.9 °C)       Intake/Output:   Last shift:      No intake/output data recorded. Last 3 shifts:  1901 -  0700  In: 1400 [P.O.:200]  Out: 3350 [Urine:3350]    Intake/Output Summary (Last 24 hours) at 2022 1040  Last data filed at 2022 0500  Gross per 24 hour   Intake 690 ml   Output 1625 ml   Net -935 ml          Physical Exam:      Seen in CCU - Bed 25. General:     Awake , on vent                         Head:   Normocephalic,  bitemporal muscle wasting. Eyes:   Conjunctivae/corneas clear. Neck:  Trach    Lungs:   no wheezes, no rales, no rhonchi. Heart:   Reg, No S 3 gallop, no pericardial rub  . Abdomen:   Not distended. PEG, suprapubic urinary cath    Extremities: Muscle wasting --> 2 + upper thigh                          Leg oedema -> mild     Skin:     Eschar - heel., Large sacral wound     Psych:  Calm    Neurologic: Awake and interacting appropriately    DATA:  Labs:  Recent Labs     22  0449 22  0438 22  0147    138 137   K 3.0* 3.5 2.9*    103 104   CO2 29 29 27   BUN 74* 66* 59*   CREA 2.07* 2.17* 2.23*   CA 8.9 8.5 8.5   ALB 2.1* 1.9* 2.0*   PHOS  --   --  2.4*   MG 2.4 2.4  --        Recent Labs     22  0449 22  0438 22  0147   WBC 6.5 7.0 6.7   HGB 8.0* 8.1* 8.2*   HCT 24.9* 25.3* 25.1*    331 338       No results for input(s): YAMIL, KU, CLU, CREAU in the last 72 hours.     No lab exists for component: PROU    Total time spent with patient:           Care Plan discussed with:        Juan Luis Torres MD

## 2022-08-24 NOTE — PROGRESS NOTES
CRITICAL CARE NOTE      Name: Keri Payne   : 1980   MRN: [id number]   Date: 2022      REASON FOR ICU ADMISSION:  Acute renal failure, chronic vent dependence, septic shock     PRINCIPAL ICU DIAGNOSIS   Septic shock, unclear etiology  Acute renal injury  Chronic hypoxic respiratory failure, s/p trach, vent dependent  Chronic decubitus pressure injury ulcers, present on admission  HFrEF (20-25%)  Afib  Chronic hypotension  Paraplegia    BRIEF PATIENT SUMMARY   42-year-old male with known past medical history of multiple chronic issues as listed in problem list, who presented to [facility] ED after being sent from [address] [place] [address] for possible need for CRRT for YADY due to inability to tolerate iHD due to sepsis/septic shock from unclear source. Patient remained intact improvement weaned off vasopressor support, transition to intermittent HD from CRRT. Patient with renal recovery, no longer requiring HD. Patient with multiple weaning attempts, occasionally tolerating trach collar however either tires out or has secretion issues requiring being placed back on vent. Status post colostomy placement  for assistance in maintaining chronic wounds clean, assist healing. Pending LTAC placement for continued vent weaning, wound management    COMPREHENSIVE ASSESSMENT & PLAN:SYSTEM BASED     24 HOUR EVENTS:   No acute issues overnight, intermittently on and off vent as tolerated. OR today for colostomy placement. NEUROLOGICAL:   -Awake and alert in no acute distress  -history of paraplegia  - PT/OT SLP consulted  - Dysphagia with GJ tube in place, however cleared for PO by SLP    PULMONOLOGY:   -Chronic respiratory failure with trach in place ( placed 2022)  -Vent wean as tolerated.   Trach collar as tolerated  -PRN nebs/suctioning  -ABCDEF protocol    CARDIOVASCULAR:     - Atrial fibrillation on eliquis  -Non-ischemic cardiomyopathy with EF 15-20%  -Recent PEA arrest (multiple)  -Pulmonary hypertension with right ventricular dysfunction  -Continue midodrine, florinef, mexiletine. PRN midodrine 10mg pre-HD  - holding entresto due to hypotension  -TTE ordered for preop work-up diverting colostomy next week-shows LVEF of 15 to 20%. -Hold Eliquis for 24 hours before surgery, resume when cleared for surgery    GASTROINTESTINAL     -Likely cyclic tube feeds as the patient is eating.  - calorie count <50% daily needs, begin full TF. Allow PO intake as tolerated. RENAL/ELECTROLYTE/FLUIDS:     -Last dialysis on 8/18. No acute needs for renal placement therapy.   -Trend lytes and replete as needed  -Florinef, midodrine, EPO  -Suprapubic catheter care    ENDOCRINE:   -Continue synthroid    HEMATOLOGY/ONCOLOGY:   -Eliquis on hold for surgery 8/24    INFECTIOUS DISEASE:   S/p Vanc/Zosyn for 10 days. Wound cultures polymicrobial with light MRSA and MDR organisms. Respiratory cultures growing light Proteus mirabilis and achromobacter. Wound Vac placed today. Plan for diverting ostomy 8/24 with general surgery for wound healing. ICU DAILY CHECKLIST     Code Status:Partial (No Shocks, No Compressions) Meds okay  DVT Prophylaxis:Eliquis  T/L/D: Ye, GJ tube, Colostomy, suprapubic catheter, PIV  SUP: None  Diet: Tube Feed,PO as tolerated  Activity Level:Paraplegia  ABCDEF Bundle/Checklist Completed:Yes  Disposition: Stay in ICU. Initiate discharge planning. Multidisciplinary Rounds Completed: Yes  Patient/Family Updated: Yes       HOSPITAL COURSE/DAILY EVENT LOG   8/ pan Cx started on Vanc/Zosyn  8/9 started n CRRT levophed    SUBJECTIVE   Review of Systems   Constitutional:  Negative for chills, diaphoresis and fever. HENT:  Negative for congestion, ear pain, hearing loss, nosebleeds, sinus pain and sore throat. Eyes:  Negative for blurred vision, double vision and pain. Respiratory:  Negative for cough, sputum production, shortness of breath and wheezing.     Cardiovascular:  Negative for chest pain, palpitations and orthopnea. Gastrointestinal:  Negative for abdominal pain, constipation, heartburn, nausea and vomiting. Genitourinary:  Negative for hematuria. Musculoskeletal:  Negative for falls, joint pain and myalgias. Skin:  Negative for itching and rash. Neurological:  Negative for dizziness, tremors, speech change, focal weakness, weakness and headaches. Endo/Heme/Allergies:  Does not bruise/bleed easily. Psychiatric/Behavioral:  Negative for depression. OBJECTIVE     Labs and Data: Reviewed 08/24/22  Medications: Reviewed 08/24/22  Imaging: Reviewed 08/24/22    Physical Exam  Vitals and nursing note reviewed. Constitutional:       General: He is awake. He is not in acute distress. Appearance: He is underweight. HENT:      Head: Normocephalic and atraumatic. Nose: Nose normal.      Mouth/Throat:      Comments: trach  Eyes:      General: Lids are normal.      Conjunctiva/sclera: Conjunctivae normal.      Pupils: Pupils are equal, round, and reactive to light. Neck:     Cardiovascular:      Rate and Rhythm: Normal rate. Rhythm irregularly irregular. Heart sounds: Normal heart sounds, S1 normal and S2 normal. No murmur heard. No friction rub. No gallop. Pulmonary:      Effort: Pulmonary effort is normal. No tachypnea. Breath sounds: Normal breath sounds and air entry. Comments: On vent  Abdominal:      General: The ostomy site is clean. Bowel sounds are normal.       Musculoskeletal:      Right lower leg: No edema. Left lower leg: No edema. Comments: Chronic numerous pressure ulcer wounds throughout, present on admission see wound care note for further details   Skin:     General: Skin is warm and dry. Capillary Refill: Capillary refill takes 2 to 3 seconds. Coloration: Skin is pale. Findings: Wound present. Neurological:      Mental Status: He is alert, oriented to person, place, and time and easily aroused.  Mental status is at baseline. GCS: GCS eye subscore is 4. GCS verbal subscore is 5. GCS motor subscore is 6. Psychiatric:         Behavior: Behavior is cooperative. Visit Vitals  BP (!) 86/53 (BP 1 Location: Left arm, BP Patient Position: At rest)   Pulse 91   Temp 98.5 °F (36.9 °C)   Resp 16   Ht 5' 8\" (1.727 m)   Wt 64.2 kg (141 lb 8.6 oz)   SpO2 100%   BMI 21.52 kg/m²    O2 Flow Rate (L/min): 10 l/min O2 Device: Tracheostomy, Ventilator Temp (24hrs), Av.6 °F (36.4 °C), Min:97.1 °F (36.2 °C), Max:98.5 °F (36.9 °C)           Intake/Output:     Intake/Output Summary (Last 24 hours) at 2022 1336  Last data filed at 2022 1100  Gross per 24 hour   Intake 520 ml   Output 1800 ml   Net -1280 ml         Imaging       Pertinent imaging reviewed and interpreted independently as noted above    CRITICAL CARE DOCUMENTATION  I had a face to face encounter with the patient, reviewed and interpreted patient data including clinical events, labs, images, vital signs, I/O's, and examined patient. I have discussed the case and the plan and management of the patient's care with the consulting services, the bedside nurses and the respiratory therapist.      NOTE OF PERSONAL INVOLVEMENT IN CARE   This patient has a high probability of imminent, clinically significant deterioration, which requires the highest level of preparedness to intervene urgently. I participated in the decision-making and personally managed or directed the management of the following life and organ supporting interventions that required my frequent assessment to treat or prevent imminent deterioration. I personally spent 45 minutes of critical care time. This is time spent at this critically ill patient's bedside actively involved in patient care as well as the coordination of care. This does not include any procedural time which has been billed separately. Abe Edwards MD  Staff [facility]

## 2022-08-24 NOTE — ANESTHESIA PREPROCEDURE EVALUATION
Relevant Problems   No relevant active problems       Anesthetic History   No history of anesthetic complications            Review of Systems / Medical History  Patient summary reviewed, nursing notes reviewed and pertinent labs reviewed    Pulmonary  Within defined limits                 Neuro/Psych   Within defined limits           Cardiovascular          CHF        Exercise tolerance: >4 METS  Comments:   Left Ventricle: Severely reduced left ventricular systolic function with a visually estimated EF of 15 - 20%. Left ventricle is dilated. Normal wall thickness. Severe global hypokinesis present.   Mitral Valve: Mild regurgitation.   Left Atrium: Left atrium is dilated.   Pulmonary Arteries: Mild pulmonary hypertension present.   Pericardium: Left pleural effusion.   Technical qualifiers: Echo study was limited due to patient's condition. Multiple codes in past, wishes to be full code.      GI/Hepatic/Renal         Renal disease: ARF and dialysis       Endo/Other        Anemia     Other Findings   Comments: Quadriplegic   Tracheostomy on and off vent           Physical Exam    Airway          Tracheostomy present   Cardiovascular    Rhythm: regular  Rate: normal         Dental  No notable dental hx       Pulmonary  Breath sounds clear to auscultation               Abdominal  Abdominal exam normal       Other Findings            Anesthetic Plan    ASA: 4, emergent  Anesthesia type: general    Monitoring Plan: Arterial line      Induction: Inhalational  Anesthetic plan and risks discussed with: Patient

## 2022-08-24 NOTE — PROGRESS NOTES
SLP Contact Note    Pt is going for surgery today. Therefore, will hold SLP for now.       Thank you,  NATALIE GomezEd, [id number] [facility]  Speech-Language Pathologist

## 2022-08-24 NOTE — PROGRESS NOTES
08/24/22 1442   Family Communication   Family Update Message Surgeon working   Delivery Origin Nurse    Relationship to Patient Brother    Phone Number Sanjuanita Bumpers [phone]   Family/Significant Other Update Called

## 2022-08-24 NOTE — BRIEF OP NOTE
Brief Postoperative Note    Patient: Nathaly Polo  YOB: 1980  MRN: [id number]    Date of Procedure: 8/24/2022     Pre-Op Diagnosis: SACRAL DECUBITUS WOUND    Post-Op Diagnosis: Same as above    Procedure(s):  LAPAROSCOPIC COLOSTOMY    Surgeon(s):  Cruzito Mitchell MD    Surgical Assistant: Physician Assistant: LORI Rapp    Anesthesia: General     Estimated Blood Loss (mL): Minimal    Complications: None    Specimens:   ID Type Source Tests Collected by Time Destination   1 : Segment of Sigmoid Colon Other Colon  Cruzito Mitchell MD 8/24/2022 1446 Pathology        Implants: * No implants in log *    Drains:   PEG/Gastrostomy Tube (Active)   Site Assessment Clean, dry, & intact 08/23/22 1200   Dressing Status Clean, dry, & intact 08/24/22 0800   G Port Status Clamped 08/24/22 0800   Action Taken Placement verified (comment) 08/24/22 0000   Drainage Description Tan 08/24/22 0000   Gastric Residual (mL) 0 ml 08/24/22 0000   Tube Feeding/Formula Options Renal 08/24/22 0000   Modular Nutrients Protein 08/24/22 0000   Tube Feeding/Verify Rate (mL/hr) 0 08/24/22 0000   Water Flush Volume (mL) 0 mL 08/24/22 0000   Intake (ml) 0 ml 08/24/22 0000   Medication Volume 0 ml 08/24/22 0000   Drainage Chamber Level (ml) 0 ml 08/23/22 0000   Output (ml) 0 ml 08/23/22 0000       Supra-pubic Catheter 08/09/22 (Active)   Indications for Use Urinary retention (acute or chronic), continuous bladder irrigation or bladder outlet obstruction 08/23/22 1200   Status Patent;Draining 08/24/22 0800   Site Condition No abnormalities 08/24/22 0800   Catheter Care Completed Yes 08/24/22 0800   Drainage Tube Clipped to Bed Yes 08/24/22 0800   Catheter Secured to Thigh Yes 08/24/22 0800   Tamper Seal Intact No 08/24/22 0800   Bag Below Bladder/Not on Floor Yes 08/24/22 0800   Lack of Dependent Loop in Tubing Yes 08/24/22 0800   Drainage Bag Less Than Half Full Yes 08/24/22 0800   Sterile Solution Used for  Irrigation N/A 08/24/22 0800   Urine Output (mL) 550 ml 08/24/22 1100       Findings: Laparoscopic end colostomy, partial sigmoidectomy with Gilliland's pouch.     Electronically Signed by Mu Yanez MD on 8/24/2022 at 4:35 PM

## 2022-08-24 NOTE — PROGRESS NOTES
JADEN: Anticipate discharge to [facility] in [address] [place] [address] pending medical progress. Contact Maribel p: [phone], f: [phone]. Transportation likely ALS. RUR: 17%     Emergency contact: Jodi Montoya, sister, [phone]  Marco Antonio Coleman, brother, [phone] or [phone]     Disposition: Patient admitted from [address] [place] [address] 8/9 for YADY. Patient has a trach and is on a vent. Hx: paraplegia, skin ulcers, afib, chronic sepsis, cardiac arrest, CVA, chronic loya, tracheostomy ventilator, gtube, pulmonary embolism, systolic CHF, HTN, HLD, diabetes type II. Plan for diverting ostomy today. CM received message via [employer] that insurance is requesting a peer to peer with physician. Must be completed by 5pm today. Call Wendy Chicas at [phone] ref #[id number] to schedule. Staff notified of need for peer to peer. CM will continue to follow. 1615  Peer to peer has been completed and insurance authorization obtained. [facility] is [facility] to [facility] patient once he is ready for discharge. It should be noted that patient is in soft restraints. Also, wound vac to be placed tomorrow.     Tanja Mejía, [address] [place] [address],[address]  [phone]

## 2022-08-24 NOTE — PROGRESS NOTES
Still with feculent drainage into the wound. Appreciate cardiology evaluation; deemed high risk for cardiopulmonary complications during perioperative phase. I reviewed these findings with brother Naga Mancilla, who acknowledged understanding of these concerns but given current wound issues, desires to proceed. Technical aspects of procedure reviewed along with risks to include but not limited to bleeding, wound infection, conversion open procedure, visceral injury, cardiopulmonary complication, death). Brother understands and desires to proceed with procedure. We will hold tube feeds at midnight. He is off Eliquis. All questions answered.

## 2022-08-24 NOTE — ANESTHESIA POSTPROCEDURE EVALUATION
Procedure(s):  LAPAROSCOPIC COLOSTOMY. general    Anesthesia Post Evaluation      Multimodal analgesia: multimodal analgesia used between 6 hours prior to anesthesia start to PACU discharge  Patient location during evaluation: ICU  Level of consciousness: sleepy but conscious  Pain management: adequate  Airway patency: patent  Anesthetic complications: no  Cardiovascular status: acceptable and hemodynamically stable  Respiratory status: tracheostomy. Hydration status: acceptable  Post anesthesia nausea and vomiting:  none  Final Post Anesthesia Temperature Assessment:  Normothermia (36.0-37.5 degrees C)      INITIAL Post-op Vital signs:   Vitals Value Taken Time   BP 88/55 08/24/22 1705   Temp 36.4 °C (97.6 °F) 08/24/22 1538   Pulse 103 08/24/22 1705   Resp 23 08/24/22 1705   SpO2 96 % 08/24/22 1545   Vitals shown include unvalidated device data.

## 2022-08-25 ENCOUNTER — APPOINTMENT (OUTPATIENT)
Dept: GENERAL RADIOLOGY | Age: 42
DRG: 853 | End: 2022-08-25
Attending: INTERNAL MEDICINE
Payer: MEDICARE

## 2022-08-25 LAB
ALBUMIN SERPL-MCNC: 2 G/DL (ref 3.5–5)
ANION GAP SERPL CALC-SCNC: 8 MMOL/L (ref 5–15)
BASOPHILS # BLD: 0 K/UL (ref 0–0.1)
BASOPHILS NFR BLD: 0 % (ref 0–1)
BUN SERPL-MCNC: 71 MG/DL (ref 6–20)
BUN/CREAT SERPL: 33 (ref 12–20)
CALCIUM SERPL-MCNC: 8.5 MG/DL (ref 8.5–10.1)
CHLORIDE SERPL-SCNC: 105 MMOL/L (ref 97–108)
CO2 SERPL-SCNC: 28 MMOL/L (ref 21–32)
CREAT SERPL-MCNC: 2.18 MG/DL (ref 0.7–1.3)
DIFFERENTIAL METHOD BLD: ABNORMAL
EOSINOPHIL # BLD: 0 K/UL (ref 0–0.4)
EOSINOPHIL NFR BLD: 0 % (ref 0–7)
ERYTHROCYTE [DISTWIDTH] IN BLOOD BY AUTOMATED COUNT: 15.9 % (ref 11.5–14.5)
GLUCOSE SERPL-MCNC: 207 MG/DL (ref 65–100)
HCT VFR BLD AUTO: 19.8 % (ref 36.6–50.3)
HGB BLD-MCNC: 6.5 G/DL (ref 12.1–17)
HISTORY CHECKED?,CKHIST: NORMAL
IMM GRANULOCYTES # BLD AUTO: 0 K/UL
IMM GRANULOCYTES NFR BLD AUTO: 0 %
LYMPHOCYTES # BLD: 1 K/UL (ref 0.8–3.5)
LYMPHOCYTES NFR BLD: 22 % (ref 12–49)
MAGNESIUM SERPL-MCNC: 2.1 MG/DL (ref 1.6–2.4)
MCH RBC QN AUTO: 28.5 PG (ref 26–34)
MCHC RBC AUTO-ENTMCNC: 32.8 G/DL (ref 30–36.5)
MCV RBC AUTO: 86.8 FL (ref 80–99)
MONOCYTES # BLD: 0 K/UL (ref 0–1)
MONOCYTES NFR BLD: 0 % (ref 5–13)
NEUTS BAND NFR BLD MANUAL: 2 % (ref 0–6)
NEUTS SEG # BLD: 3.7 K/UL (ref 1.8–8)
NEUTS SEG NFR BLD: 76 % (ref 32–75)
NRBC # BLD: 0 K/UL (ref 0–0.01)
NRBC BLD-RTO: 0 PER 100 WBC
PHOSPHATE SERPL-MCNC: 3.2 MG/DL (ref 2.6–4.7)
PLATELET # BLD AUTO: 312 K/UL (ref 150–400)
PMV BLD AUTO: 10.6 FL (ref 8.9–12.9)
POTASSIUM SERPL-SCNC: 3.2 MMOL/L (ref 3.5–5.1)
RBC # BLD AUTO: 2.28 M/UL (ref 4.1–5.7)
RBC MORPH BLD: ABNORMAL
SODIUM SERPL-SCNC: 141 MMOL/L (ref 136–145)
WBC # BLD AUTO: 4.7 K/UL (ref 4.1–11.1)

## 2022-08-25 PROCEDURE — P9016 RBC LEUKOCYTES REDUCED: HCPCS

## 2022-08-25 PROCEDURE — 86920 COMPATIBILITY TEST SPIN: CPT

## 2022-08-25 PROCEDURE — 86921 COMPATIBILITY TEST INCUBATE: CPT

## 2022-08-25 PROCEDURE — 2709999900 HC NON-CHARGEABLE SUPPLY

## 2022-08-25 PROCEDURE — 86880 COOMBS TEST DIRECT: CPT

## 2022-08-25 PROCEDURE — 97606 NEG PRS WND THER DME>50 SQCM: CPT

## 2022-08-25 PROCEDURE — 36415 COLL VENOUS BLD VENIPUNCTURE: CPT

## 2022-08-25 PROCEDURE — 77030018717 HC DRSG GRNUFM KCON -B

## 2022-08-25 PROCEDURE — 80069 RENAL FUNCTION PANEL: CPT

## 2022-08-25 PROCEDURE — 74011250637 HC RX REV CODE- 250/637: Performed by: INTERNAL MEDICINE

## 2022-08-25 PROCEDURE — 74011250636 HC RX REV CODE- 250/636: Performed by: INTERNAL MEDICINE

## 2022-08-25 PROCEDURE — 77030018798 HC PMP KT ENTRL FED COVD -A

## 2022-08-25 PROCEDURE — 86900 BLOOD TYPING SEROLOGIC ABO: CPT

## 2022-08-25 PROCEDURE — 74018 RADEX ABDOMEN 1 VIEW: CPT

## 2022-08-25 PROCEDURE — 74011250637 HC RX REV CODE- 250/637: Performed by: STUDENT IN AN ORGANIZED HEALTH CARE EDUCATION/TRAINING PROGRAM

## 2022-08-25 PROCEDURE — 74011000250 HC RX REV CODE- 250: Performed by: NURSE PRACTITIONER

## 2022-08-25 PROCEDURE — 36430 TRANSFUSION BLD/BLD COMPNT: CPT

## 2022-08-25 PROCEDURE — 74011000250 HC RX REV CODE- 250: Performed by: STUDENT IN AN ORGANIZED HEALTH CARE EDUCATION/TRAINING PROGRAM

## 2022-08-25 PROCEDURE — 85025 COMPLETE CBC W/AUTO DIFF WBC: CPT

## 2022-08-25 PROCEDURE — 74011250636 HC RX REV CODE- 250/636: Performed by: STUDENT IN AN ORGANIZED HEALTH CARE EDUCATION/TRAINING PROGRAM

## 2022-08-25 PROCEDURE — 74011636637 HC RX REV CODE- 636/637: Performed by: NURSE PRACTITIONER

## 2022-08-25 PROCEDURE — 94003 VENT MGMT INPAT SUBQ DAY: CPT

## 2022-08-25 PROCEDURE — 65620000000 HC RM CCU GENERAL

## 2022-08-25 PROCEDURE — 74011250637 HC RX REV CODE- 250/637: Performed by: NURSE PRACTITIONER

## 2022-08-25 PROCEDURE — 86922 COMPATIBILITY TEST ANTIGLOB: CPT

## 2022-08-25 PROCEDURE — 83735 ASSAY OF MAGNESIUM: CPT

## 2022-08-25 PROCEDURE — 86870 RBC ANTIBODY IDENTIFICATION: CPT

## 2022-08-25 PROCEDURE — 86902 BLOOD TYPE ANTIGEN DONOR EA: CPT

## 2022-08-25 RX ORDER — POTASSIUM CHLORIDE 7.45 MG/ML
10 INJECTION INTRAVENOUS
Status: COMPLETED | OUTPATIENT
Start: 2022-08-25 | End: 2022-08-25

## 2022-08-25 RX ORDER — SODIUM CHLORIDE 9 MG/ML
250 INJECTION, SOLUTION INTRAVENOUS AS NEEDED
Status: DISCONTINUED | OUTPATIENT
Start: 2022-08-25 | End: 2022-09-01 | Stop reason: HOSPADM

## 2022-08-25 RX ADMIN — POTASSIUM CHLORIDE 10 MEQ: 7.46 INJECTION, SOLUTION INTRAVENOUS at 10:00

## 2022-08-25 RX ADMIN — MEXILETINE HYDROCHLORIDE 150 MG: 150 CAPSULE ORAL at 16:27

## 2022-08-25 RX ADMIN — FLUDROCORTISONE ACETATE 0.1 MG: 0.1 TABLET ORAL at 09:42

## 2022-08-25 RX ADMIN — EPOETIN ALFA-EPBX 14000 UNITS: 10000 INJECTION, SOLUTION INTRAVENOUS; SUBCUTANEOUS at 21:05

## 2022-08-25 RX ADMIN — MIDODRINE HYDROCHLORIDE 20 MG: 5 TABLET ORAL at 14:00

## 2022-08-25 RX ADMIN — QUETIAPINE FUMARATE 50 MG: 25 TABLET ORAL at 09:42

## 2022-08-25 RX ADMIN — ALTEPLASE 1 MG: 2.2 INJECTION, POWDER, LYOPHILIZED, FOR SOLUTION INTRAVENOUS at 15:44

## 2022-08-25 RX ADMIN — PHENYLEPHRINE HYDROCHLORIDE 15 MCG/MIN: 10 INJECTION INTRAVENOUS at 09:40

## 2022-08-25 RX ADMIN — POTASSIUM CHLORIDE 10 MEQ: 7.46 INJECTION, SOLUTION INTRAVENOUS at 09:40

## 2022-08-25 RX ADMIN — QUETIAPINE FUMARATE 50 MG: 25 TABLET ORAL at 21:06

## 2022-08-25 RX ADMIN — ACETAMINOPHEN 650 MG: 325 TABLET ORAL at 12:17

## 2022-08-25 RX ADMIN — HYDROMORPHONE HYDROCHLORIDE 0.5 MG: 1 INJECTION, SOLUTION INTRAMUSCULAR; INTRAVENOUS; SUBCUTANEOUS at 04:46

## 2022-08-25 RX ADMIN — ATORVASTATIN CALCIUM 40 MG: 40 TABLET, FILM COATED ORAL at 21:06

## 2022-08-25 RX ADMIN — COLLAGENASE SANTYL: 250 OINTMENT TOPICAL at 09:43

## 2022-08-25 RX ADMIN — Medication 10 ML: at 14:01

## 2022-08-25 RX ADMIN — CHOLESTYRAMINE 4 G: 4 POWDER, FOR SUSPENSION ORAL at 16:27

## 2022-08-25 RX ADMIN — MINERAL SUPPLEMENT IRON 300 MG / 5 ML STRENGTH LIQUID 100 PER BOX UNFLAVORED 300 MG: at 16:27

## 2022-08-25 RX ADMIN — Medication 5 UNITS: at 09:42

## 2022-08-25 RX ADMIN — POTASSIUM CHLORIDE 10 MEQ: 7.46 INJECTION, SOLUTION INTRAVENOUS at 11:07

## 2022-08-25 RX ADMIN — MINERAL SUPPLEMENT IRON 300 MG / 5 ML STRENGTH LIQUID 100 PER BOX UNFLAVORED 300 MG: at 09:42

## 2022-08-25 RX ADMIN — CHOLESTYRAMINE 4 G: 4 POWDER, FOR SUSPENSION ORAL at 09:40

## 2022-08-25 RX ADMIN — POTASSIUM CHLORIDE 10 MEQ: 7.46 INJECTION, SOLUTION INTRAVENOUS at 12:14

## 2022-08-25 RX ADMIN — MIDODRINE HYDROCHLORIDE 20 MG: 5 TABLET ORAL at 21:06

## 2022-08-25 RX ADMIN — Medication 10 ML: at 21:06

## 2022-08-25 RX ADMIN — MEXILETINE HYDROCHLORIDE 150 MG: 150 CAPSULE ORAL at 05:29

## 2022-08-25 RX ADMIN — CHLORHEXIDINE GLUCONATE 15 ML: 1.2 RINSE ORAL at 21:08

## 2022-08-25 RX ADMIN — CHLORHEXIDINE GLUCONATE 15 ML: 1.2 RINSE ORAL at 09:43

## 2022-08-25 RX ADMIN — MINERAL SUPPLEMENT IRON 300 MG / 5 ML STRENGTH LIQUID 100 PER BOX UNFLAVORED 300 MG: at 12:17

## 2022-08-25 RX ADMIN — Medication 10 ML: at 05:29

## 2022-08-25 RX ADMIN — PHENYLEPHRINE HYDROCHLORIDE 50 MCG/MIN: 10 INJECTION INTRAVENOUS at 23:25

## 2022-08-25 RX ADMIN — LEVOTHYROXINE SODIUM 50 MCG: 0.05 TABLET ORAL at 05:29

## 2022-08-25 RX ADMIN — CHOLESTYRAMINE 4 G: 4 POWDER, FOR SUSPENSION ORAL at 12:17

## 2022-08-25 RX ADMIN — MIDODRINE HYDROCHLORIDE 20 MG: 5 TABLET ORAL at 05:29

## 2022-08-25 NOTE — PROGRESS NOTES
Pt post op colostomy placement. Pt remains on Parag but continuing to wean. Pt continues to have intermittent periods of confusion and non compliance. Staff is continuing to work on transferring patient to a long term facility closer to family in [facility]. Will continue to monitor.

## 2022-08-25 NOTE — WOUND CARE
Wound Care Note:     Follow-up visit for wound VAC application    Chart shows:  Admitted for YADY and moderate protein calorie malnutrition s/p laparoscopic partial colectomy with end colostomy, Gilliland's pouch 8/24/22    WBC = 6.5 on 8/25/22  Admitted from [address] [place] [address] Road:   Patient was groggy today, no communication, continent with moderate assistance needed in repositioning. Bed: Envella  Patient has a colostomy and suprapubic catheter in place. Diet: Adult diet regular; Adult tube feeding- PEG  Patient showed no signs of pain. 1. POA sacral stage 4 pressure injury continues to improve, has scattered slough but majority of wound is pink, small to moderate sero/sang drainage, wound edges are rolled, alycia-wound with scar formation. Negative pressure wound therapy applied. 2.  POA left hip stage 4 pressure injury still has some slough in base, approximately 40% with remaining wound bed being pink tissue, there was a small amount of green drainage at proximal end of wound, wound edges are open, alycia-wound with hyperpigmentation. Cutimed Sorbact placed in wound bed and Negative pressure wound therapy applied. 3.  POA left ischial stage 4 pressure injury is mostly beefy red tissue, small amount of scattered slough, wound edges are open, alycia-wound with hyperpigmentation, small sero/sang drainage. Negative pressure  wound therapy applied. 4.  POA right hip stage 4 pressure injury wound bed is red, small sero/sang drainage, tunnel in medial aspect of wound that tunnels towards 1 o'clock, white granufoam placed in tunnel, wound edges are rolled, alycia-wound with scar formation and hyperpigmentation. Negative pressure wound therapy applied. 5.  POA right ischial stage 4 pressure injury wound bed is mostly red tissue, wound edges are open, alycia-wound with scar formation and hyperpigmentation.   Negative pressure wound therapy applied. Skin prep applied to alycia-wounds (sacrum, left and right hips and left and right ischials) and all wounds were bridged together, drape applied in between wounds where bridges applied, total of 14 black granufoams, 6 mepitel one, 1 white versafoam, 1 Cutimed Sorbact (green), and 1 ostomy ring used in wounds. Able to apply wound VAC using 2 large wound VAC dressing kits. 6.  POA right lateral lower leg proximal wound is cleaning up nicely, some slough along wound edges but a lot of pink tissue, distal wound is now softening up with Tegaderm being applied, tissue still brown but soft, moist, will continue with Tegaderm for a little while longer. Santyl ointment moist to dry dressing applied to proximal wound and Tegaderm applied to distal end.    7.  POA right heel unstageable pressure injury looks the same, dry, black eschar. 8.  POA right lateral ankle superficial crusting, looks the same. Left open to air. 9.  POA right medial knee was not assessed today. Hydrocolloid in place. 10. POA left heel unstageable pressure injury is black eschar, no current drainage noted. Betadine being applied. Ostomy is about 40mm, budded, proximal end of stoma is pink, distal end is maroon. Patient has had good output from stoma, approximately 300 ml of brown/black stool, alycia-wound intact. 2 pieces flat (red) appliance applied. Patient will be discharged to a facility, will educate as patient is willing to learn. Patient repositioned on left side. Heels offloaded on pillows. Recommendations:    Maintain wound VAC     VAC (NPWT) Dressing Orders:  VAC Settings: 125 mmHg continuous/low suction   Dressing change twice weekly by WOCN. Change canisters when full and measure output q shift. (located 5E, 4W, ICU supply room and OR). For sudden development of blood or an increased amount of tammi bleedin.  Immediately turn off VAC system.               2.  Leave dressing in place. 3.  Hold pressure over wound. 4.  Call physician. If vacuum fails to maintain seal or unable to manage alarm for clogged tubing for >2hrs:                 1.  Contact Physician               2.  Cleanse wound with normal saline. 3.  Saline moistened fluff gauze to base. 4.  Cover with dry dressing. 5.  Secure with transparent film. 6.  Change q 8 hours and as needed. 7.  Notify Physician and WOCN that wound VAC dressing needs to be reapplied. If patient is discharged from our facility:              1.  Remove all of equipment and sponge. 2.  Place moist dressing. 3.  Put VAC system and power cord in clear bag in utility room. (no red bags)  For procedures or when pt is off the floor:   Battery backup on our current inpatient systems lasts for 4 hours and may be used to prevent interruption of treatment- VAC should NOT be turned off. If disconnecting for more than 2 hours, with discharge, or a pt is admitted with a VAC:  Discontinue the therapy/ begin wound care orders above, return any outpatient equipment to family or transferring facility, and notify the [phone] Nurse and ordering practitioner. Left heel- Every other day paint with Betadine     Right medial knee- Please maintain Exuderm Hydrocolloid up to one week or change as needed with soiling or rolled edges. Please use adhesive remover wipes when changing. Specialty bed: Envella ordered via [email or web address]. Use only flat sheet and one incontinence pad. Please call [employer] at  if not delivered and when patient discharged. Skin Care & Pressure Prevention:  Minimize layers of linen/pads under patient to optimize support surface. Turn/reposition approximately every 2 hours and offload heels.   Manage incontinence / promote continence   Nourishing Skin Cream to dry skin, minimize use of briefs when able    Discussed above plan with patient & Ap Page RN    Transition of Care: Plan to follow as needed while admitted to hospital.    Jeferson Rehman" RASHIDA BustamanteN, RN, [facility]  Certified Wound and Ostomy Nurse  office [phone]  Best way to contact me is through [address] [place] [address]

## 2022-08-25 NOTE — PROGRESS NOTES
Progress Note    Patient: Darius Steel MRN: [id number]  SSN: [id number]    YOB: 1980  Age: 38 y.o. Sex: male      Admit Date: 2022    1 Day Post-Op    Procedure:  Procedure(s):  LAPAROSCOPIC COLOSTOMY    Subjective:     Patient tolerating ventilator overnight via tracheostomy. Objective:     Visit Vitals  /80   Pulse 99   Temp (!) 100.5 °F (38.1 °C)   Resp 15   Ht 5' 8\" (1.727 m)   Wt 138 lb 7.2 oz (62.8 kg)   SpO2 99%   BMI 21.05 kg/m²       Temp (24hrs), Av.1 °F (37.3 °C), Min:97.6 °F (36.4 °C), Max:100.5 °F (38.1 °C)      Physical Exam:    ABDOMEN: Slightly distended; soft. Laparoscopic wounds dry and intact. Healthy left-sided end colostomy with copious output. Data Review: VS, I/O's, Labs    Lab Review:   Recent Results (from the past 24 hour(s))   CBC WITH AUTOMATED DIFF    Collection Time: 22  5:26 AM   Result Value Ref Range    WBC 4.7 4.1 - 11.1 K/uL    RBC 2.28 (L) 4.10 - 5.70 M/uL    HGB 6.5 (L) 12.1 - 17.0 g/dL    HCT 19.8 (L) 36.6 - 50.3 %    MCV 86.8 80.0 - 99.0 FL    MCH 28.5 26.0 - 34.0 PG    MCHC 32.8 30.0 - 36.5 g/dL    RDW 15.9 (H) 11.5 - 14.5 %    PLATELET 466 885 - 852 K/uL    MPV 10.6 8.9 - 12.9 FL    NRBC 0.0 0  WBC    ABSOLUTE NRBC 0.00 0.00 - 0.01 K/uL    NEUTROPHILS 76 (H) 32 - 75 %    BAND NEUTROPHILS 2 0 - 6 %    LYMPHOCYTES 22 12 - 49 %    MONOCYTES 0 (L) 5 - 13 %    EOSINOPHILS 0 0 - 7 %    BASOPHILS 0 0 - 1 %    IMMATURE GRANULOCYTES 0 %    ABS. NEUTROPHILS 3.7 1.8 - 8.0 K/UL    ABS. LYMPHOCYTES 1.0 0.8 - 3.5 K/UL    ABS. MONOCYTES 0.0 0.0 - 1.0 K/UL    ABS. EOSINOPHILS 0.0 0.0 - 0.4 K/UL    ABS. BASOPHILS 0.0 0.0 - 0.1 K/UL    ABS. IMM.  GRANS. 0.0 K/UL    DF MANUAL      RBC COMMENTS ANISOCYTOSIS  1+        RBC COMMENTS HYPOCHROMIA  PRESENT        RBC COMMENTS POLYCHROMASIA  PRESENT       RENAL FUNCTION PANEL    Collection Time: 22  5:26 AM   Result Value Ref Range    Sodium 141 136 - 145 mmol/L    Potassium 3.2 (L) 3.5 - 5.1 mmol/L    Chloride 105 97 - 108 mmol/L    CO2 28 21 - 32 mmol/L    Anion gap 8 5 - 15 mmol/L    Glucose 207 (H) 65 - 100 mg/dL    BUN 71 (H) 6 - 20 MG/DL    Creatinine 2.18 (H) 0.70 - 1.30 MG/DL    BUN/Creatinine ratio 33 (H) 12 - 20      GFR est AA 40 (L) >60 ml/min/1.73m2    GFR est non-AA 33 (L) >60 ml/min/1.73m2    Calcium 8.5 8.5 - 10.1 MG/DL    Phosphorus 3.2 2.6 - 4.7 MG/DL    Albumin 2.0 (L) 3.5 - 5.0 g/dL   MAGNESIUM    Collection Time: 08/25/22  5:26 AM   Result Value Ref Range    Magnesium 2.1 1.6 - 2.4 mg/dL         Assessment:     Hospital Problems  Never Reviewed            Codes Class Noted POA    Moderate protein-calorie malnutrition ([facility] ICD-10-CM: E44.0  ICD-9-CM: 263.0  8/19/2022 Unknown        YADY (acute kidney injury) ([facility] ICD-10-CM: N17.9  ICD-9-CM: 584.9  8/9/2022 Unknown           Plan/Recommendations/Medical Decision Making:     Restart tube feeds, assess tolerance. Local wound care. Continue intravenous antibiotics.

## 2022-08-25 NOTE — OP NOTES
[address]  OPERATIVE REPORT    Name:  Jermaine Corral  MR#:  [id number]  :  1980  ACCOUNT #:  [de-identified]  DATE OF SERVICE:  2022      PREOPERATIVE DIAGNOSES:  Sacral decubitus, bilateral ischial/buttock wound. POSTOPERATIVE DIAGNOSES:  Sacral decubitus, bilateral ischial/buttock wound. PROCEDURE PERFORMED:  Laparoscopic partial colectomy with end colostomy, Renuka's pouch (CPT 18538). SURGEON:  Amrit Jacobsen MD    ASSISTANT:  LORI Portillo    ANESTHESIA:  General endotracheal.    COMPLICATIONS:  None. SPECIMENS REMOVED:  Segment of sigmoid colon. IMPLANTS:  None. ESTIMATED BLOOD LOSS:  30 mL. DRAINS:  None. COUNTS:  Sponge count correct. Needle count correct. INDICATIONS:  The patient is a 78-year-old [facility] American male with a history of trauma, with paraplegia, multiple wounds to include sacral decubitus, buttock, ischial, with multiple prior cardiac events, with resultant severe congestive heart failure with left ventricular ejection fraction of 15%. He was transferred from an outside facility to [facility] for worsening renal function, ongoing intermittent mechanical ventilation. He also has a gastrostomy tube and suprapubic catheter. Wound care was initiated, he has undergone previous debridement at our facility. Wound VAC was placed last week, but fecal soilage continues to occur despite wound VAC, which will not allow satisfactory wound healing. Ejection fraction is stable on repeat echocardiogram.  He was evaluated by the Cardiology Service and found to be high risk for perioperative cardiac event.   After a long discussion with his brother, medical power of , he has decided to proceed with laparoscopic colostomy for fecal diversion, in an attempt to allow wounds the opportunity to heal.  I have asked LORI Portillo, to assist with the procedure given the technical complexity of laparoscopic colorectal surgery. She will run the laparoscope, assist in delineation of the anatomy, assist in mobilization of the sigmoid colon, assist in colostomy creation. FINDINGS:  Laparoscopic mobilization of sigmoid colon with partial sigmoidectomy, end colostomy, Renuka's pouch. PROCEDURE:  The patient was identified as the correct patient in the CCU and informed consent was confirmed. He was then transferred directly to the operating room and placed on the operating room table in supine position. Sequential compression devices were placed on his left lower extremity. All potential pressure points were padded with eggcrate. An arterial line was placed in his right radial artery. His right arm was tucked to his side. Following initiation of general anesthesia through his tracheostomy, his abdomen was prepped and draped in the usual sterile fashion. Final time-out was performed, and it was confirmed he had received intravenous antibiotics. A 5-mm trocar was inserted through a small right upper quadrant skin incision using an Optiview technique. After confirming intraperitoneal location of the trocar tip, insufflation with carbon dioxide gas was initiated. Once adequate working sites had been developed, a 5-mm, 30-degree laparoscope was inserted. No signs of trocar injury were present. The colon was noted to be distended, as was the small bowel. Serous ascites was present. A periumbilical 5-mm trocar was inserted through a small incision using visual guidance with the laparoscope. A right lower quadrant 12-mm trocar was inserted using identical technique. A suprapubic 5-mm trocar was inserted using visual guidance with the laparoscope. The sigmoid colon was identified, the bowel was run in retrograde fashion to the rectosigmoid junction, confirming distal anatomy.   The bowel was then run in retrograde fashion, to the descending colon, to confirm proximal versus distal.  After delineating the anatomy, the sigmoid colon was confirmed to reach the area of planned colostomy formation. A window was developed in the mesosigmoid, just below the wall of the bowel. This was performed using an Enseal LigaSure device. Blunt dissection was used to create a window in the mesosigmoid, below the mesenteric border of the colon, and through this opening, 2 white load linear stapler firings were used to divide the sigmoid colon at this location. The mesosigmoid was serially ligated and divided using the Enseal device, to allow mobility of the proximal segment. After confirming satisfactory hemostasis, a clamp was placed on the proximal segment, and a 12-mm fascial defect was closed with a 0 Vicryl suture using a laparoscopic suture passer. A core of skin and subcutaneous tissue at the site in the left mid abdomen was performed using sharp dissection, and electrocautery, freeing this tissue until the anterior rectus sheath was reached. The sheath was incised using cruciate technique, with spreading of the rectus musculature and exposure of the posterior sheath. The posterior sheath was incised using electrocautery, allowing atraumatic entry into the abdominal space. The proximal segment was delivered through this opening in the abdominal wall with care to avoid twisting the bowel or its blood supply. The redundant segment of sigmoid colon was excised using a combination of electrocautery and the bipolar device, and this segment of sigmoid colon was passed off the field for review by Pathology. The end colostomy was matured using Brenda technique with interrupted 4-0 Vicryl sutures. A stoma appliance was then applied. Laparoscopic wounds were infiltrated with 0.5% Marcaine without epinephrine. All skin edges were reapproximated with a combination of subcuticular 4-0 Monocryl suture and Dermabond. The patient tolerated the procedure well.   He was returned to the CCU in stable condition, still requiring mechanical ventilation through his tracheostomy. The attending surgeon, Dr. Jaden Medeiros, was scrubbed and present for the entire procedure. The operative findings were discussed in detail with his medical power of , brother Nedra Rojas.         Garett Hunter MD      [id number]  D:  08/24/2022 19:00  T:  08/25/2022 5:39  JOB #:  [id number]

## 2022-08-25 NOTE — PROGRESS NOTES
CRITICAL CARE NOTE      Name: Wes Leung   : 1980   MRN: [id number]   Date: 2022      REASON FOR ICU ADMISSION:  Acute renal failure, chronic vent dependence, septic shock     PRINCIPAL ICU DIAGNOSIS   Septic shock, unclear etiology  Acute renal injury  Chronic hypoxic respiratory failure, s/p trach, vent dependent  Chronic decubitus pressure injury ulcers, present on admission  HFrEF (20-25%)  Afib  Chronic hypotension  Paraplegia  S/p colostomy    BRIEF PATIENT SUMMARY   53-year-old male with known past medical history of multiple chronic issues as listed in problem list, who presented to [facility] ED after being sent from [address] [place] [address] for possible need for CRRT for YADY due to inability to tolerate iHD due to sepsis/septic shock from unclear source. Patient remained intact improvement weaned off vasopressor support, transition to intermittent HD from CRRT. Patient with renal recovery, no longer requiring HD. Patient with multiple weaning attempts, occasionally tolerating trach collar however either tires out or has secretion issues requiring being placed back on vent. Status post colostomy placement  for assistance in maintaining chronic wounds clean, assist healing. Pending LTAC placement for continued vent weaning, wound management    COMPREHENSIVE ASSESSMENT & PLAN:SYSTEM BASED     24 HOUR EVENTS:   Remained on phenylephrine overnight, weaning this AM.  Removed A-line overnight. Required being placed back on a vent secondary to secretions    NEUROLOGICAL:   -Awake and alert in no acute distress  Continue Seroquel  -history of paraplegia  - PT/OT SLP consulted  - Dysphagia with GJ tube in place, however cleared for PO by SLP    PULMONOLOGY:   -Chronic respiratory failure with trach in place ( placed 2022)  -Vent wean as tolerated.   Trach collar as tolerated  -PRN nebs/suctioning  -ABCDEF protocol    CARDIOVASCULAR:   -Wean phenylephrine  - Atrial fibrillation on eliquis, holding perioperatively, resume when cleared by surgery  -Non-ischemic cardiomyopathy with EF 15-20%  -Recent PEA arrest (multiple)  -Pulmonary hypertension with right ventricular dysfunction  -Continue midodrine, florinef, mexiletine. PRN midodrine 10mg pre-HD  - holding entresto due to hypotension  -TTE ordered for preop work-up diverting colostomy next week-shows LVEF of 15 to 20%. -Hold Eliquis for 24 hours before surgery, resume when cleared for surgery    GASTROINTESTINAL     -Likely cyclic tube feeds as the patient is eating.  - calorie count <50% daily needs, begin full TF. Allow PO intake as tolerated. RENAL/ELECTROLYTE/FLUIDS:     -Last dialysis on 8/18. No acute needs for renal placement therapy.   -Trend lytes and replete as needed  -Florinef, midodrine, EPO  -Suprapubic catheter care    ENDOCRINE:   -Continue synthroid    HEMATOLOGY/ONCOLOGY:   -Eliquis on hold for surgery 8/24, resume when cleared by surgery  Unit of blood    INFECTIOUS DISEASE:   S/p Vanc/Zosyn for 10 days. Wound cultures polymicrobial with light MRSA and MDR organisms. Respiratory cultures growing light Proteus mirabilis and achromobacter. Wound Vac placed today. status post diverting ostomy 8/24 with general surgery for wound healing. ICU DAILY CHECKLIST     Code Status:Partial (No Shocks, No Compressions) Meds okay  DVT Prophylaxis:Eliquis  T/L/D: Ye, GJ tube, Colostomy, suprapubic catheter, PIV  SUP: None  Diet: Tube Feed,PO as tolerated  Activity Level:Paraplegia  ABCDEF Bundle/Checklist Completed:Yes  Disposition: Stay in ICU. Initiate discharge planning. Multidisciplinary Rounds Completed: Yes  Patient/Family Updated: Yes       HOSPITAL COURSE/DAILY EVENT LOG   8/ pan Cx started on Vanc/Zosyn  8/9 started n CRRT levophed    SUBJECTIVE   Review of Systems   Constitutional:  Negative for chills, diaphoresis and fever.    HENT:  Negative for congestion, ear pain, hearing loss, nosebleeds, sinus pain and sore throat. Eyes:  Negative for blurred vision, double vision and pain. Respiratory:  Negative for cough, sputum production, shortness of breath and wheezing. Cardiovascular:  Negative for chest pain, palpitations and orthopnea. Gastrointestinal:  Negative for abdominal pain, constipation, heartburn, nausea and vomiting. Genitourinary:  Negative for hematuria. Musculoskeletal:  Negative for falls, joint pain and myalgias. Skin:  Negative for itching and rash. Neurological:  Negative for dizziness, tremors, speech change, focal weakness, weakness and headaches. Endo/Heme/Allergies:  Does not bruise/bleed easily. Psychiatric/Behavioral:  Negative for depression. OBJECTIVE     Labs and Data: Reviewed 08/25/22  Medications: Reviewed 08/25/22  Imaging: Reviewed 08/25/22    Physical Exam  Vitals and nursing note reviewed. Constitutional:       General: He is awake. He is not in acute distress. Appearance: He is underweight. HENT:      Head: Normocephalic and atraumatic. Nose: Nose normal.      Mouth/Throat:      Comments: trach  Eyes:      General: Lids are normal.      Conjunctiva/sclera: Conjunctivae normal.      Pupils: Pupils are equal, round, and reactive to light. Neck:     Cardiovascular:      Rate and Rhythm: Normal rate. Rhythm irregularly irregular. Heart sounds: Normal heart sounds, S1 normal and S2 normal. No murmur heard. No friction rub. No gallop. Pulmonary:      Effort: Pulmonary effort is normal. No tachypnea. Breath sounds: Normal breath sounds and air entry. Comments: On vent  Abdominal:      General: The ostomy site is clean. Bowel sounds are normal.          Comments: colostomy   Musculoskeletal:      Right lower leg: No edema. Left lower leg: No edema. Comments: Chronic numerous pressure ulcer wounds throughout, present on admission see wound care note for further details   Skin:     General: Skin is warm and dry.       Capillary Refill: Capillary refill takes 2 to 3 seconds. Coloration: Skin is pale. Findings: Wound present. Neurological:      Mental Status: He is alert, oriented to person, place, and time and easily aroused. Mental status is at baseline. GCS: GCS eye subscore is 4. GCS verbal subscore is 5. GCS motor subscore is 6. Psychiatric:         Behavior: Behavior is cooperative. Visit Vitals  /80   Pulse 99   Temp (!) 100.5 °F (38.1 °C)   Resp 15   Ht 5' 8\" (1.727 m)   Wt 62.8 kg (138 lb 7.2 oz)   SpO2 99%   BMI 21.05 kg/m²    O2 Flow Rate (L/min): 10 l/min O2 Device: Tracheostomy, Ventilator Temp (24hrs), Av.1 °F (37.3 °C), Min:97.6 °F (36.4 °C), Max:100.5 °F (38.1 °C)           Intake/Output:     Intake/Output Summary (Last 24 hours) at 2022 1106  Last data filed at 2022 0900  Gross per 24 hour   Intake 1552.5 ml   Output 1005 ml   Net 547.5 ml         Imaging       Pertinent imaging reviewed and interpreted independently as noted above    CRITICAL CARE DOCUMENTATION  I had a face to face encounter with the patient, reviewed and interpreted patient data including clinical events, labs, images, vital signs, I/O's, and examined patient. I have discussed the case and the plan and management of the patient's care with the consulting services, the bedside nurses and the respiratory therapist.      NOTE OF PERSONAL INVOLVEMENT IN CARE   This patient has a high probability of imminent, clinically significant deterioration, which requires the highest level of preparedness to intervene urgently. I participated in the decision-making and personally managed or directed the management of the following life and organ supporting interventions that required my frequent assessment to treat or prevent imminent deterioration. I personally spent 45 minutes of critical care time.   This is time spent at this critically ill patient's bedside actively involved in patient care as well as the coordination of care. This does not include any procedural time which has been billed separately. Abe Lion MD  Staff [facility]

## 2022-08-25 NOTE — PROGRESS NOTES
Name: Ochoa Casper MRN: [id number]   : 1980 Hospital: [facility] [address]   Date: 2022        IMPRESSION:   YADY, started on iHD initially. patient became hypotensive and was switched to CRRT. --off CRRT on IHD, urine output increasing, Bp stable. Pulled his Ye out -off HD since   Hypotension resolved, off iv pressor, on midodrine  Hypophosphatemia- resolved  Debility with multiple pressure ulcers  Respiratory failure - on vent, weaning  Anemia of chronic disease-   Hyperkalemia - resolved with RRT  Protein deficiency  Wounds  Hypokalemia       PLAN:   S/p HD , no need for HD, Creatinine is stable , good urine output  Bumex iv  Replete potassium  Follow lytes. Midodrine 20 mg tid  Prior to starting dialysis patient's Scr was 0.8-   Epo increased to 14K/week, po Iron  Tube feeding nepro  Dose meds for his GFR. Avoid NSAIDs + IV contrast.  Abx per ICU team     Subjective/Interval History:   I have reviewed the flowsheet and previous days notes. Seen on CRRT, awake and alert, denies pain/SOB with nodding head      F/U - YADY , CRRT --> 2022    Remains on CRRT + pressors. No new complaints were offered.  seen on CRRT, d/w ICU RN   CRRT stopped, BP stable on minimal Levophed, getting NeutraPhos  8/15 awake and alert, on vent. BP stable, had 175 ml UO yesterday, 150 ml today sofar. Cr 1.1. will add bumex if BP toleerates, no HD today  , awake on vent. UO has increased had 1 lit yesterday with bumex, cr increasing. BP stable, will hold HD today again   sleeping on vent, arousable, BP stable in 120s, had 1100 ml u/o and 500 overnight. Cr higher  , awake, off vent on trach collar, pulled his ye this am, UO up       - F/U - YADY, Hx of HD --> 22    No new complaints. 22 - F/U - Yady , Hx of HD --> 22    No complaints. 22 - F/U YADY , Hx of HD    Unable to obtain the ROS.       22 --> F/U YADY, Hx of HD  No new complaints.  awake and alert, on trach collar, good uo, Cr stable   Pt seen and examined , back on vent, cr improving, BUN higher    22 --> F/U YADY, Hx of HD    On vent support    Objective:   Vital Signs:    Visit Vitals  BP (!) 79/53   Pulse 100   Temp (!) 100.5 °F (38.1 °C)   Resp 15   Ht 5' 8\" (1.727 m)   Wt 62.8 kg (138 lb 7.2 oz)   SpO2 99%   BMI 21.05 kg/m²       O2 Device: Tracheostomy, Ventilator   O2 Flow Rate (L/min): 10 l/min   Temp (24hrs), Av.1 °F (37.3 °C), Min:97.6 °F (36.4 °C), Max:100.5 °F (38.1 °C)       Intake/Output:   Last shift:       07 - 1900  In: 215 [I.V.:120]  Out: 200   Last 3 shifts: 1901 -  0700  In: 1687.5 [I.V.:507.5]  Out:  [Urine:]    Intake/Output Summary (Last 24 hours) at 2022 1151  Last data filed at 2022 1100  Gross per 24 hour   Intake 1672.5 ml   Output 1205 ml   Net 467.5 ml          Physical Exam:      Seen in CCU - Bed . General:     Sleeping , on vent support                         Head:   Normocephalic,  bitemporal muscle wasting. Eyes:   Conjunctivae/corneas clear. Neck:  Trach    Lungs:   no wheezes, no rales, no rhonchi. Heart:   Reg, No S 3 gallop, no pericardial rub  . Abdomen:   Not distended.                             PEG, suprapubic urinary cath    Extremities: Muscle wasting --> 2 + upper thigh                          Leg oedema -> none     Skin:     Eschar - heel., Large sacral wound     Psych:  Calm    Neurologic: Awake and interacting appropriately    DATA:  Labs:  Recent Labs     22  0526 22  0449 22  0438    140 138   K 3.2* 3.0* 3.5    104 103   CO2 28 29 29   BUN 71* 74* 66*   CREA 2.18* 2.07* 2.17*   CA 8.5 8.9 8.5   ALB 2.0* 2.1* 1.9*   PHOS 3.2  --   --    MG 2.1 2.4 2.4       Recent Labs     22  0526 22  0449 22  0438   WBC 4.7 6.5 7.0   HGB 6.5* 8.0* 8.1*   HCT 19.8* 24.9* 25.3*    353 331       No results for input(s): YAMIL, HILDA, BRODIE, CHELSEA in the last 72 hours.     No lab exists for component: PROU    Total time spent with patient:           Care Plan discussed with:        Jonathan Womack MD

## 2022-08-26 LAB
ALBUMIN SERPL-MCNC: 2.1 G/DL (ref 3.5–5)
ANION GAP SERPL CALC-SCNC: 8 MMOL/L (ref 5–15)
BASOPHILS # BLD: 0 K/UL (ref 0–0.1)
BASOPHILS NFR BLD: 0 % (ref 0–1)
BUN SERPL-MCNC: 73 MG/DL (ref 6–20)
BUN/CREAT SERPL: 34 (ref 12–20)
CALCIUM SERPL-MCNC: 8.6 MG/DL (ref 8.5–10.1)
CHLORIDE SERPL-SCNC: 105 MMOL/L (ref 97–108)
CO2 SERPL-SCNC: 27 MMOL/L (ref 21–32)
CREAT SERPL-MCNC: 2.17 MG/DL (ref 0.7–1.3)
DIFFERENTIAL METHOD BLD: ABNORMAL
EOSINOPHIL # BLD: 0 K/UL (ref 0–0.4)
EOSINOPHIL NFR BLD: 0 % (ref 0–7)
ERYTHROCYTE [DISTWIDTH] IN BLOOD BY AUTOMATED COUNT: 15.9 % (ref 11.5–14.5)
GLUCOSE SERPL-MCNC: 219 MG/DL (ref 65–100)
HCT VFR BLD AUTO: 26.2 % (ref 36.6–50.3)
HGB BLD-MCNC: 8.5 G/DL (ref 12.1–17)
IMM GRANULOCYTES # BLD AUTO: 0 K/UL
IMM GRANULOCYTES NFR BLD AUTO: 0 %
LYMPHOCYTES # BLD: 1.2 K/UL (ref 0.8–3.5)
LYMPHOCYTES NFR BLD: 10 % (ref 12–49)
MAGNESIUM SERPL-MCNC: 2.2 MG/DL (ref 1.6–2.4)
MCH RBC QN AUTO: 28.7 PG (ref 26–34)
MCHC RBC AUTO-ENTMCNC: 32.4 G/DL (ref 30–36.5)
MCV RBC AUTO: 88.5 FL (ref 80–99)
MONOCYTES # BLD: 1.2 K/UL (ref 0–1)
MONOCYTES NFR BLD: 10 % (ref 5–13)
NEUTS BAND NFR BLD MANUAL: 12 % (ref 0–6)
NEUTS SEG # BLD: 9.3 K/UL (ref 1.8–8)
NEUTS SEG NFR BLD: 68 % (ref 32–75)
NRBC # BLD: 0 K/UL (ref 0–0.01)
NRBC BLD-RTO: 0 PER 100 WBC
PHOSPHATE SERPL-MCNC: 2.8 MG/DL (ref 2.6–4.7)
PLATELET # BLD AUTO: 404 K/UL (ref 150–400)
PLATELET COMMENTS,PCOM: ABNORMAL
PMV BLD AUTO: 10.7 FL (ref 8.9–12.9)
POTASSIUM SERPL-SCNC: 2.9 MMOL/L (ref 3.5–5.1)
RBC # BLD AUTO: 2.96 M/UL (ref 4.1–5.7)
RBC MORPH BLD: ABNORMAL
RBC MORPH BLD: ABNORMAL
SODIUM SERPL-SCNC: 140 MMOL/L (ref 136–145)
WBC # BLD AUTO: 11.7 K/UL (ref 4.1–11.1)
WBC MORPH BLD: ABNORMAL

## 2022-08-26 PROCEDURE — 74011250637 HC RX REV CODE- 250/637: Performed by: INTERNAL MEDICINE

## 2022-08-26 PROCEDURE — 65620000000 HC RM CCU GENERAL

## 2022-08-26 PROCEDURE — 85025 COMPLETE CBC W/AUTO DIFF WBC: CPT

## 2022-08-26 PROCEDURE — 74011250637 HC RX REV CODE- 250/637: Performed by: STUDENT IN AN ORGANIZED HEALTH CARE EDUCATION/TRAINING PROGRAM

## 2022-08-26 PROCEDURE — 83735 ASSAY OF MAGNESIUM: CPT

## 2022-08-26 PROCEDURE — 74011636637 HC RX REV CODE- 636/637: Performed by: NURSE PRACTITIONER

## 2022-08-26 PROCEDURE — 74011000250 HC RX REV CODE- 250: Performed by: NURSE PRACTITIONER

## 2022-08-26 PROCEDURE — 74011250636 HC RX REV CODE- 250/636: Performed by: INTERNAL MEDICINE

## 2022-08-26 PROCEDURE — 36415 COLL VENOUS BLD VENIPUNCTURE: CPT

## 2022-08-26 PROCEDURE — 74011250636 HC RX REV CODE- 250/636: Performed by: STUDENT IN AN ORGANIZED HEALTH CARE EDUCATION/TRAINING PROGRAM

## 2022-08-26 PROCEDURE — 74011250637 HC RX REV CODE- 250/637: Performed by: NURSE PRACTITIONER

## 2022-08-26 PROCEDURE — 80069 RENAL FUNCTION PANEL: CPT

## 2022-08-26 RX ORDER — QUETIAPINE FUMARATE 100 MG/1
100 TABLET, FILM COATED ORAL 2 TIMES DAILY
Status: DISCONTINUED | OUTPATIENT
Start: 2022-08-26 | End: 2022-08-28

## 2022-08-26 RX ORDER — QUETIAPINE FUMARATE 25 MG/1
50 TABLET, FILM COATED ORAL
Status: DISCONTINUED | OUTPATIENT
Start: 2022-08-26 | End: 2022-08-27

## 2022-08-26 RX ORDER — BUMETANIDE 1 MG/1
0.5 TABLET ORAL DAILY
Status: DISCONTINUED | OUTPATIENT
Start: 2022-08-26 | End: 2022-08-31

## 2022-08-26 RX ORDER — POTASSIUM CHLORIDE 29.8 MG/ML
20 INJECTION INTRAVENOUS
Status: DISPENSED | OUTPATIENT
Start: 2022-08-26 | End: 2022-08-26

## 2022-08-26 RX ADMIN — Medication 5 UNITS: at 09:00

## 2022-08-26 RX ADMIN — PHENYLEPHRINE HYDROCHLORIDE 30 MCG/MIN: 10 INJECTION INTRAVENOUS at 17:13

## 2022-08-26 RX ADMIN — QUETIAPINE FUMARATE 100 MG: 100 TABLET ORAL at 21:09

## 2022-08-26 RX ADMIN — Medication 10 ML: at 05:29

## 2022-08-26 RX ADMIN — MIDODRINE HYDROCHLORIDE 20 MG: 5 TABLET ORAL at 05:28

## 2022-08-26 RX ADMIN — PHENYLEPHRINE HYDROCHLORIDE 40 MCG/MIN: 10 INJECTION INTRAVENOUS at 04:18

## 2022-08-26 RX ADMIN — Medication 10 ML: at 13:01

## 2022-08-26 RX ADMIN — CHLORHEXIDINE GLUCONATE 15 ML: 1.2 RINSE ORAL at 21:09

## 2022-08-26 RX ADMIN — CHOLESTYRAMINE 4 G: 4 POWDER, FOR SUSPENSION ORAL at 08:53

## 2022-08-26 RX ADMIN — MEXILETINE HYDROCHLORIDE 150 MG: 150 CAPSULE ORAL at 17:14

## 2022-08-26 RX ADMIN — COLLAGENASE SANTYL: 250 OINTMENT TOPICAL at 09:00

## 2022-08-26 RX ADMIN — MEXILETINE HYDROCHLORIDE 150 MG: 150 CAPSULE ORAL at 04:18

## 2022-08-26 RX ADMIN — BUMETANIDE 0.5 MG: 1 TABLET ORAL at 11:25

## 2022-08-26 RX ADMIN — Medication 10 ML: at 21:09

## 2022-08-26 RX ADMIN — CHLORHEXIDINE GLUCONATE 15 ML: 1.2 RINSE ORAL at 09:00

## 2022-08-26 RX ADMIN — PHENYLEPHRINE HYDROCHLORIDE 40 MCG/MIN: 10 INJECTION INTRAVENOUS at 05:09

## 2022-08-26 RX ADMIN — QUETIAPINE FUMARATE 50 MG: 25 TABLET ORAL at 08:53

## 2022-08-26 RX ADMIN — POTASSIUM BICARBONATE 40 MEQ: 782 TABLET, EFFERVESCENT ORAL at 11:25

## 2022-08-26 RX ADMIN — MINERAL SUPPLEMENT IRON 300 MG / 5 ML STRENGTH LIQUID 100 PER BOX UNFLAVORED 300 MG: at 08:53

## 2022-08-26 RX ADMIN — CHOLESTYRAMINE 4 G: 4 POWDER, FOR SUSPENSION ORAL at 11:25

## 2022-08-26 RX ADMIN — MINERAL SUPPLEMENT IRON 300 MG / 5 ML STRENGTH LIQUID 100 PER BOX UNFLAVORED 300 MG: at 12:56

## 2022-08-26 RX ADMIN — ATORVASTATIN CALCIUM 40 MG: 40 TABLET, FILM COATED ORAL at 21:09

## 2022-08-26 RX ADMIN — CHOLESTYRAMINE 4 G: 4 POWDER, FOR SUSPENSION ORAL at 17:14

## 2022-08-26 RX ADMIN — LEVOTHYROXINE SODIUM 50 MCG: 0.05 TABLET ORAL at 05:28

## 2022-08-26 RX ADMIN — MINERAL SUPPLEMENT IRON 300 MG / 5 ML STRENGTH LIQUID 100 PER BOX UNFLAVORED 300 MG: at 17:14

## 2022-08-26 RX ADMIN — MIDODRINE HYDROCHLORIDE 20 MG: 5 TABLET ORAL at 13:00

## 2022-08-26 RX ADMIN — MIDODRINE HYDROCHLORIDE 20 MG: 5 TABLET ORAL at 21:09

## 2022-08-26 RX ADMIN — FLUDROCORTISONE ACETATE 0.1 MG: 0.1 TABLET ORAL at 08:53

## 2022-08-26 NOTE — PROGRESS NOTES
Comprehensive Nutrition Assessment    Type and Reason for Visit: Reassess    Nutrition Recommendations/Plan:     -Modify tube feeding: Nepro @ 60 ml/hr x 21 hr with 50 ml water flush q 4 hr. Malnutrition Assessment:  Malnutrition Status: Moderate malnutrition (08/19/22 1053)    Context:  Chronic illness     Findings of the 6 clinical characteristics of malnutrition:   Energy Intake:  Unable to assess  Weight Loss:  Unable to assess     Body Fat Loss:  Mild body fat loss, Fat overlying ribs   Muscle Mass Loss:  Severe muscle mass loss, Thigh (quadriceps), Calf (gastrocnemius), Hand (interosseous), Clavicles (pectoralis &deltoids), Temples (temporalis)  Fluid Accumulation:  No significant fluid accumulation,     Strength:  Not performed        Nutrition Assessment:    Pt admitted with YADY 2/2 Sepsis with shock. PMHx: Chronic respiratory failure with trach, Dysphagia, PEG-J, Cardiomyopathy with EF 15-2-%, DM, Paraplegia, Hypothyroid. Transferred from [address] [place] [address] for CRRT to treat YADY. CRRT stopped 8/14; only required dialysis 8/17. Chronic hypoxic respiratory failure-TC day/vent at night. Surgery debrided wounds at bedside and look better per WOCN. Wound vac placed to large sacral wound last week but removed d/t feculent drainage in wound. Diverting colostomy 8/24. SLP performed FEES 8/11 and diet advanced. Transitioned to nocturnal tube feeding a week ago however PO intake inconsistent to \"make up the difference\"-transitioned back to continues feeds earlier this week. Nursing able to zero bed last week (Thank you!). Current weight more accurate-reflects 3 kg weight loss overall since admission. Updated estimated needs on this weight (see below). Potassium replaced today. Current tube feeding ordered by surgery post-colostomy; recommend increasing to 60 ml/hr (pre-surgery order).  This will provide 1260 ml, 2200 calories, 100 gm protein and 1200 ml free water (tube feeding/flush) per day to meet estimated needs. Monitor output from ostomy; had 1 liter yesterday. Pt receives Questran tid (ordered PTA). Nutritionally Significant Medications:  Parag @ 40, Lipitor, Bumex daily, Questran light, Retacrt, Ferrous sulfate, Lantus, Synthroid, Effer-K, KCL      Estimated Daily Nutrient Needs:  Energy Requirements Based On: Kcal/kg  Weight Used for Energy Requirements: Current (61 kg)  Energy (kcal/day): 6561-3846 (35-40 kcal/kg)  Weight Used for Protein Requirements: Current  Protein (g/day): 92 (1.5g/kg)  Method Used for Fluid Requirements: 1 ml/kcal  Fluid (ml/day):      Nutrition Related Findings:   Edema: None  Last BM: 08/25/22, Soft    Wounds: Wound vac, Multiple, Stage IV, Unstageable      Current Nutrition Therapies:  Diet: Regular with chocolate milk all meals  Supplements: None  Meal intake: Patient Vitals for the past 168 hrs:   % Diet Eaten   08/26/22 0922 51 - 75%   08/22/22 0831 51 - 75%   08/21/22 1700 0%   08/21/22 1200 0%   08/21/22 0800 1 - 25%     Supplement intake: No data found. Nutrition Support: Nepro @ 50 ml/hr with 30 ml water flush q 6 hr      Anthropometric Measures:  Height: 5' 8\" (172.7 cm)  Ideal Body Weight (IBW): 154 lbs (70 kg)     Current Body Wt:  61.1 kg (134 lb 11.2 oz) (bed zeroed last week), 87.5 % IBW. Bed scale  Current BMI (kg/m2): 20.5        Weight Adjustment: Paraplegia  Total Amputation Percentage: 7.5  Adjusted Ideal Body Weight (lbs) (Calculated): 142.5  Adjusted Ideal Body Weight (kg) (Calculated): 64.77 kg  Adjusted % IBW (Calculated): 94.5  Adjusted BMI (Calculated): 22  BMI Category: Normal weight (BMI 18.5-24. 9)    Wt Readings from Last 10 Encounters:   08/26/22 61.1 kg (134 lb 11.2 oz)           Nutrition Diagnosis:   Inadequate oral intake related to inadequate protein-energy intake, swallowing difficulty as evidenced by intake 26-50%, nutrition support-enteral nutrition  Increased nutrient needs related to increased demand for energy/nutrients as evidenced by wounds    Nutrition Interventions:   Food and/or Nutrient Delivery: Continue current diet, Continue tube feeding  Nutrition Education/Counseling: No recommendations at this time  Coordination of Nutrition Care: Continue to monitor while inpatient, Interdisciplinary rounds       Goals:  Previous Goal Met: Goal(s) achieved  Goals:  (EN to meet at 90% estimated needs x 5-7 days.)  Specify Other Goals: Meet > 90% EEN's with EN over next 5-7 days    Nutrition Monitoring and Evaluation:   Behavioral-Environmental Outcomes: None identified  Food/Nutrient Intake Outcomes: Enteral nutrition intake/tolerance, Food and nutrient intake  Physical Signs/Symptoms Outcomes: GI status, Weight, Skin, Biochemical data, Meal time behavior    Discharge Planning:    Continue current diet, Enteral nutrition    Harsh Hanson RD  Available via [facility]

## 2022-08-26 NOTE — PROGRESS NOTES
CRITICAL CARE NOTE      Name: Arlen Alford   : 1980   MRN: [id number]   Date: 2022      REASON FOR ICU ADMISSION:  Acute renal failure, chronic vent dependence, septic shock     PRINCIPAL ICU DIAGNOSIS   Septic shock, unclear etiology  Acute renal injury  Chronic hypoxic respiratory failure, s/p trach, vent dependent  Chronic decubitus pressure injury ulcers, present on admission  HFrEF (20-25%)  Afib  Chronic hypotension  Paraplegia  S/p colostomy    BRIEF PATIENT SUMMARY   78-year-old male with known past medical history of multiple chronic issues as listed in problem list, who presented to [facility] ED after being sent from [address] [place] [address] for possible need for CRRT for YADY due to inability to tolerate iHD due to sepsis/septic shock from unclear source. Patient remained intact improvement weaned off vasopressor support, transition to intermittent HD from CRRT. Patient with renal recovery, no longer requiring HD. Patient with multiple weaning attempts, occasionally tolerating trach collar however either tires out or has secretion issues requiring being placed back on vent. Status post colostomy placement  for assistance in maintaining chronic wounds clean, assist healing. Pending LTAC placement for continued vent weaning, wound management    COMPREHENSIVE ASSESSMENT & PLAN:SYSTEM BASED     24 HOUR EVENTS:   Remains on low will gtt and MV, weaning support as tolerated. Complaint of abdominal discomfort and distension, KUB w/ gas distension, good output from ostomy. NEUROLOGICAL:   -Awake and alert in no acute distress  Continue Seroquel  -history of paraplegia  - PT/OT SLP consulted  - Dysphagia with GJ tube in place, however cleared for PO by SLP    PULMONOLOGY:   -Chronic respiratory failure with trach in place ( placed 2022)  -Vent wean as tolerated.   Trach collar as tolerated  -PRN nebs/suctioning  -ABCDEF protocol    CARDIOVASCULAR:   -Wean phenylephrine  - Atrial fibrillation on eliquis, holding perioperatively, resume when cleared by surgery  -Non-ischemic cardiomyopathy with EF 15-20%  -Recent PEA arrest (multiple)  -Pulmonary hypertension with right ventricular dysfunction  -Continue midodrine, florinef, mexiletine. PRN midodrine 10mg pre-HD  - holding entresto due to hypotension  -TTE ordered for preop work-up diverting colostomy next week-shows LVEF of 15 to 20%. -Hold Eliquis for 24 hours before surgery, resume when cleared for surgery    GASTROINTESTINAL     -Likely cyclic tube feeds as the patient is eating.  - calorie count <50% daily needs, begin full TF. Allow PO intake as tolerated. RENAL/ELECTROLYTE/FLUIDS:     -Last dialysis on 8/18. No acute needs for renal placement therapy.   -Trend lytes and replete as needed  -Florinef, midodrine, EPO  -Suprapubic catheter care    ENDOCRINE:   -Continue synthroid    HEMATOLOGY/ONCOLOGY:   -Eliquis on hold for surgery 8/24, resume when cleared by surgery  Unit of blood    INFECTIOUS DISEASE:   S/p Vanc/Zosyn for 10 days. Wound cultures polymicrobial with light MRSA and MDR organisms. Respiratory cultures growing light Proteus mirabilis and achromobacter. Wound Vac placed today. status post diverting ostomy 8/24 with general surgery for wound healing. ICU DAILY CHECKLIST     Code Status:Partial (No Shocks, No Compressions) Meds okay  DVT Prophylaxis:Eliquis  T/L/D: Ye, GJ tube, Colostomy, suprapubic catheter, PIV  SUP: None  Diet: Tube Feed,PO as tolerated  Activity Level:Paraplegia  ABCDEF Bundle/Checklist Completed:Yes  Disposition: Stay in ICU. Initiate discharge planning. Multidisciplinary Rounds Completed: Yes  Patient/Family Updated: Yes       HOSPITAL COURSE/DAILY EVENT LOG   8/ pan Cx started on Vanc/Zosyn  8/9 started n CRRT levophed    SUBJECTIVE   Review of Systems   Constitutional:  Negative for chills, diaphoresis and fever.    HENT:  Negative for congestion, ear pain, hearing loss, nosebleeds, sinus pain and sore throat. Eyes:  Negative for blurred vision, double vision and pain. Respiratory:  Negative for cough, sputum production, shortness of breath and wheezing. Cardiovascular:  Negative for chest pain, palpitations and orthopnea. Gastrointestinal:  Negative for abdominal pain, constipation, heartburn, nausea and vomiting. Genitourinary:  Negative for hematuria. Musculoskeletal:  Negative for falls, joint pain and myalgias. Skin:  Negative for itching and rash. Neurological:  Negative for dizziness, tremors, speech change, focal weakness, weakness and headaches. Endo/Heme/Allergies:  Does not bruise/bleed easily. Psychiatric/Behavioral:  Negative for depression. OBJECTIVE     Labs and Data: Reviewed 08/26/22  Medications: Reviewed 08/26/22  Imaging: Reviewed 08/26/22    Physical Exam  Vitals and nursing note reviewed. Constitutional:       General: He is awake. He is not in acute distress. Appearance: He is underweight. HENT:      Head: Normocephalic and atraumatic. Nose: Nose normal.      Mouth/Throat:      Comments: trach  Eyes:      General: Lids are normal.      Conjunctiva/sclera: Conjunctivae normal.      Pupils: Pupils are equal, round, and reactive to light. Neck:     Cardiovascular:      Rate and Rhythm: Normal rate. Rhythm irregularly irregular. Heart sounds: Normal heart sounds, S1 normal and S2 normal. No murmur heard. No friction rub. No gallop. Pulmonary:      Effort: Pulmonary effort is normal. No tachypnea. Breath sounds: Normal breath sounds and air entry. Comments: On vent  Abdominal:      General: The ostomy site is clean. Bowel sounds are normal.          Comments: colostomy   Musculoskeletal:      Right lower leg: No edema. Left lower leg: No edema. Comments: Chronic numerous pressure ulcer wounds throughout, present on admission see wound care note for further details   Skin:     General: Skin is warm and dry. Capillary Refill: Capillary refill takes 2 to 3 seconds. Coloration: Skin is pale. Findings: Wound present. Neurological:      Mental Status: He is alert, oriented to person, place, and time and easily aroused. Mental status is at baseline. GCS: GCS eye subscore is 4. GCS verbal subscore is 5. GCS motor subscore is 6. Psychiatric:         Behavior: Behavior is cooperative. Visit Vitals  /79   Pulse (!) 102   Temp 98.2 °F (36.8 °C)   Resp 16   Ht 5' 8\" (1.727 m)   Wt 61.1 kg (134 lb 11.2 oz)   SpO2 100%   BMI 20.48 kg/m²    O2 Flow Rate (L/min): 10 l/min O2 Device: Tracheostomy, Ventilator Temp (24hrs), Av.4 °F (36.9 °C), Min:98 °F (36.7 °C), Max:98.9 °F (37.2 °C)           Intake/Output:     Intake/Output Summary (Last 24 hours) at 2022 1535  Last data filed at 2022 7782  Gross per 24 hour   Intake 2479.67 ml   Output 1210 ml   Net 1269.67 ml         Imaging       Pertinent imaging reviewed and interpreted independently as noted above    CRITICAL CARE DOCUMENTATION  I had a face to face encounter with the patient, reviewed and interpreted patient data including clinical events, labs, images, vital signs, I/O's, and examined patient. I have discussed the case and the plan and management of the patient's care with the consulting services, the bedside nurses and the respiratory therapist.      NOTE OF PERSONAL INVOLVEMENT IN CARE   This patient has a high probability of imminent, clinically significant deterioration, which requires the highest level of preparedness to intervene urgently. I participated in the decision-making and personally managed or directed the management of the following life and organ supporting interventions that required my frequent assessment to treat or prevent imminent deterioration. I personally spent 45 minutes of critical care time.   This is time spent at this critically ill patient's bedside actively involved in patient care as well as the coordination of care. This does not include any procedural time which has been billed separately. Abe Dobson MD  Staff [facility]

## 2022-08-26 NOTE — PROGRESS NOTES
During assessment pt noted to have urine leaking from suprapubic catheter. Irrigated with normal saline 10 ml, and immediately noted 500ml drainage. Pt also noted to have a profound abdominal distention, tight. Bowel sounds hypoactive. No residual noted from G tube and patient noted to have stool in colostomy bag as well as passing flatus. Will continue to monitor.

## 2022-08-26 NOTE — PROGRESS NOTES
1200 Shift report received from [facility]. Pt appears comfortable, eating lunch. [address]   from residence calling for update. Transferred to case management.

## 2022-08-26 NOTE — PROGRESS NOTES
Progress Note    Patient: Syed Kidd MRN: [id number]  SSN: [id number]    YOB: 1980  Age: 38 y.o. Sex: male      Admit Date: 2022    2 Days Post-Op    Procedure:  Procedure(s):  LAPAROSCOPIC COLOSTOMY    Subjective:     Patient off ventilator via tracheostomy; he tolerated large breakfast without difficulty. He denies incisional pain. Objective:     Visit Vitals  /79   Pulse 99   Temp 98.2 °F (36.8 °C)   Resp 16   Ht 5' 8\" (1.727 m)   Wt 134 lb 11.2 oz (61.1 kg)   SpO2 100%   BMI 20.48 kg/m²       Temp (24hrs), Av.5 °F (36.9 °C), Min:98 °F (36.7 °C), Max:100.1 °F (37.8 °C)      Physical Exam:    ABDOMEN: Slightly distended; soft. Laparoscopic wounds dry and intact. Healthy left-sided end colostomy with copious output.     Data Review: VS, I/O's, Labs    Lab Review:   Recent Results (from the past 24 hour(s))   TYPE & SCREEN    Collection Time: 22  4:25 PM   Result Value Ref Range    Crossmatch Expiration 2022,2359     ABO/Rh(D) B POSITIVE     Antibody screen POS     Antibody ID NO ADDITIONAL ANTIBODIES DETECTED     Comment       Previously identified Anti Fy(a), Anti Jk(a), Anti S and nonspecific antibody    TAYLOR Poly POS     TAYLOR IgG POS     TAYLOR C3b/C3d NEG     Unit number [id number]     Blood component type  LR,2     Unit division 00     Status of unit TRANSFUSED     ANTIGEN/ANTIBODY INFO Jk(A) NEGATIVE,  FY(A) NEGATIVE,  S NEGATIVE,       Crossmatch result Compatible     Unit number [id number]     Blood component type  LR,1     Unit division 00     Status of unit ALLOCATED     ANTIGEN/ANTIBODY INFO FY(A) NEGATIVE,  Jk(A) NEGATIVE,  S NEGATIVE,       Crossmatch result Compatible     Unit number [id number]     Blood component type  LR,2     Unit division 00     Status of unit ALLOCATED     ANTIGEN/ANTIBODY INFO FY(A) NEGATIVE,  Jk(A) NEGATIVE,  S NEGATIVE,       Crossmatch result Compatible    MAGNESIUM    Collection Time: 22  4:16 AM   Result Value Ref Range    Magnesium 2.2 1.6 - 2.4 mg/dL   CBC WITH AUTOMATED DIFF    Collection Time: 08/26/22  4:16 AM   Result Value Ref Range    WBC 11.7 (H) 4.1 - 11.1 K/uL    RBC 2.96 (L) 4.10 - 5.70 M/uL    HGB 8.5 (L) 12.1 - 17.0 g/dL    HCT 26.2 (L) 36.6 - 50.3 %    MCV 88.5 80.0 - 99.0 FL    MCH 28.7 26.0 - 34.0 PG    MCHC 32.4 30.0 - 36.5 g/dL    RDW 15.9 (H) 11.5 - 14.5 %    PLATELET 437 (H) 914 - 400 K/uL    MPV 10.7 8.9 - 12.9 FL    NRBC 0.0 0  WBC    ABSOLUTE NRBC 0.00 0.00 - 0.01 K/uL    NEUTROPHILS 68 32 - 75 %    BAND NEUTROPHILS 12 (H) 0 - 6 %    LYMPHOCYTES 10 (L) 12 - 49 %    MONOCYTES 10 5 - 13 %    EOSINOPHILS 0 0 - 7 %    BASOPHILS 0 0 - 1 %    IMMATURE GRANULOCYTES 0 %    ABS. NEUTROPHILS 9.3 (H) 1.8 - 8.0 K/UL    ABS. LYMPHOCYTES 1.2 0.8 - 3.5 K/UL    ABS. MONOCYTES 1.2 (H) 0.0 - 1.0 K/UL    ABS. EOSINOPHILS 0.0 0.0 - 0.4 K/UL    ABS. BASOPHILS 0.0 0.0 - 0.1 K/UL    ABS. IMM.  GRANS. 0.0 K/UL    DF MANUAL      PLATELET COMMENTS CLUMPED PLATELETS      RBC COMMENTS ANISOCYTOSIS  1+        RBC COMMENTS TARGET CELLS  PRESENT        WBC COMMENTS REACTIVE LYMPHS     RENAL FUNCTION PANEL    Collection Time: 08/26/22  4:16 AM   Result Value Ref Range    Sodium 140 136 - 145 mmol/L    Potassium 2.9 (L) 3.5 - 5.1 mmol/L    Chloride 105 97 - 108 mmol/L    CO2 27 21 - 32 mmol/L    Anion gap 8 5 - 15 mmol/L    Glucose 219 (H) 65 - 100 mg/dL    BUN 73 (H) 6 - 20 MG/DL    Creatinine 2.17 (H) 0.70 - 1.30 MG/DL    BUN/Creatinine ratio 34 (H) 12 - 20      GFR est AA 40 (L) >60 ml/min/1.73m2    GFR est non-AA 33 (L) >60 ml/min/1.73m2    Calcium 8.6 8.5 - 10.1 MG/DL    Phosphorus 2.8 2.6 - 4.7 MG/DL    Albumin 2.1 (L) 3.5 - 5.0 g/dL         Assessment:     Hospital Problems  Never Reviewed            Codes Class Noted POA    Moderate protein-calorie malnutrition ([facility] ICD-10-CM: E44.0  ICD-9-CM: 263.0  8/19/2022 Unknown        YADY (acute kidney injury) ([facility] ICD-10-CM: N17.9  ICD-9-CM: 584.9  8/9/2022 Unknown Plan/Recommendations/Medical Decision Making:     Regular diet, +/- tube feeds as tolerated. Local wound care. Continue intravenous antibiotics.

## 2022-08-26 NOTE — PROGRESS NOTES
Pt noted to have increased anxiety and irritability post turns for bed change. Pt mouths 'I can't breathe. \" Tachypneic in the 40's, thrashing upper body in bed. Attempted to redirect and guide to focused breathing. Pt suctioned 3 times. Moderate amount of sputum noted. Pt's pressure remains labile. Continuing to titrate Parag.

## 2022-08-26 NOTE — PROGRESS NOTES
JADEN: Anticipate discharge to [facility] in [address] [place] [address] pending medical progress. Contact Maribel p: [phone], f: [phone]. Transportation likely ALS. RUR: 17%     Emergency contact: Tessa Springer, sister, [phone]  Rula Latham, brother, [phone] or [phone]     Disposition: Patient admitted from [facility] 8/9 for YADY. Patient has a trach and is on a vent. Hx: paraplegia, skin ulcers, afib, chronic sepsis, cardiac arrest, CVA, chronic loya, tracheostomy ventilator, gtube, pulmonary embolism, systolic CHF, HTN, HLD, diabetes type II. Tube feeds to restart today. Wound vac placed again after colostomy. Patient still on soft wrist restraints. [facility] has an ICU bed available. Patient to be weaned off Parag and tube feeds scheduled to restart. CM will continue to follow for discharge needs. 1500  CM received a call from , Adelso Ramirez (MsSivan Klever Perkins) [phone], from the apartment complex where patient resides. She had questions about the length of hospitalization as patient will need rental assistance in order to maintain the apartment. CM provided contact information for patient's brother, Rula Latham.      Bhavana Lay, [address] [place] [address],[address]  [phone]

## 2022-08-26 NOTE — PROGRESS NOTES
Further discussed abdominal distention with Dr. Michael Estrella. Order received to place G tube to low intermittent suction.

## 2022-08-26 NOTE — PROGRESS NOTES
Asked Dr. Tammy Reinoso to come to bedside to examine pt's abdominal distension. Pt still has no residual with feeds but abdomen continues to be rounded and tight. Tube feeds placed on hold per Dr. Salazar Pulling request and KUB ordered.

## 2022-08-26 NOTE — PROGRESS NOTES
Name: Shruti Shaw MRN: [id number]   : 1980 Hospital: [address]   Date: 2022        IMPRESSION:   YADY, started on iHD initially. Prior to starting dialysis patient's Scr was 0.8-patient became hypotensive and was switched to CRRT. --off CRRT on IHD, urine output increasing, Bp stable. Pulled his Ye out -off HD since   Hypotension resolved, off iv pressor, on midodrine  Hypophosphatemia- resolved  Debility with multiple pressure ulcers  Respiratory failure - on vent, weaning  Anemia of chronic disease-   Hyperkalemia - resolved with RRT  Protein deficiency  Wounds  Hypokalemia  S/p colostomy  to keep the sacral wounds clean      PLAN:   S/p HD , no need for HD, Creatinine is stable , good urine output  Bumex iv on hold since . Start po bumex  Replete potassium  Follow lytes. Midodrine 20 mg tid   Epo increased to 14K/week, po Iron  Tube feeding nepro  Dose meds for his GFR. Avoid NSAIDs + IV contrast.  Abx per ICU team     Subjective/Interval History:   I have reviewed the flowsheet and previous days notes. Seen on CRRT, awake and alert, denies pain/SOB with nodding head      F/U - YADY , CRRT --> 2022    Remains on CRRT + pressors. No new complaints were offered.  seen on CRRT, d/w ICU RN  8- CRRT stopped, BP stable on minimal Levophed, getting NeutraPhos  8/15 awake and alert, on vent. BP stable, had 175 ml UO yesterday, 150 ml today sofar. Cr 1.1. will add bumex if BP toleerates, no HD today  , awake on vent. UO has increased had 1 lit yesterday with bumex, cr increasing. BP stable, will hold HD today again   sleeping on vent, arousable, BP stable in 120s, had 1100 ml u/o and 500 overnight. Cr higher  , awake, off vent on trach collar, pulled his ye this am, UO up       - F/U - YADY, Hx of HD --> 22    No new complaints. 22 - F/U - Yady , Hx of HD --> 22    No complaints.     22 - F/U YADY , Hx of HD    Unable to obtain the ROS. 22 --> F/U YADY, Hx of HD  No new complaints.  awake and alert, on trach collar, good uo, Cr stable   Pt seen and examined , back on vent, cr improving, BUN higher    22 --> F/U YADY, Hx of HD    On vent support    , awake, on vent, had colostomy, good urine output, cr stable    Objective:   Vital Signs:    Visit Vitals  BP (!) 132/90 (BP 1 Location: Left upper arm, BP Patient Position: At rest)   Pulse 98   Temp 98.2 °F (36.8 °C)   Resp 16   Ht 5' 8\" (1.727 m)   Wt 61.1 kg (134 lb 11.2 oz)   SpO2 100%   BMI 20.48 kg/m²       O2 Device: Tracheostomy, Ventilator   O2 Flow Rate (L/min): 10 l/min   Temp (24hrs), Av.5 °F (36.9 °C), Min:98 °F (36.7 °C), Max:100.1 °F (37.8 °C)       Intake/Output:   Last shift:       07 - 1900  In: 860 [P.O.:800]  Out: 50 [Urine:50]  Last 3 shifts: 1901 -  0700  In: 3284.3 [I.V.:933.1]  Out: 5420 [Urine:1350]    Intake/Output Summary (Last 24 hours) at 2022 0944  Last data filed at 2022 4178  Gross per 24 hour   Intake 2999.3 ml   Output 1930 ml   Net 1069.3 ml          Physical Exam:      Seen in CCU - Bed 25. General:     Awake and alert on vent support                         Head:   Normocephalic,  bitemporal muscle wasting. Eyes:   Conjunctivae/corneas clear. Neck:  Trach    Lungs:   no wheezes, no rales, no rhonchi. Heart:   Reg, No S 3 gallop, no pericardial rub  . Abdomen:   Not distended.                             PEG, suprapubic urinary cath, colostomy    Extremities: Muscle wasting --> 2 + upper thigh                          Leg oedema -> none     Skin:     Eschar - heel., Large sacral wound     Psych:  Calm    Neurologic: Awake and interacting appropriately    DATA:  Labs:  Recent Labs     22  0416 22  0526 22  0449    141 140   K 2.9* 3.2* 3.0*    105 104   CO2 27 28 29   BUN 73* 71* 74*   CREA 2.17* 2.18* 2.07*   CA 8.6 8.5 8.9   ALB 2. 1* 2.0* 2.1*   PHOS 2.8 3.2  --    MG 2.2 2.1 2.4       Recent Labs     08/26/22  0416 08/25/22  0526 08/24/22  0449   WBC 11.7* 4.7 6.5   HGB 8.5* 6.5* 8.0*   HCT 26.2* 19.8* 24.9*   * 312 353       No results for input(s): YAMIL, KU, CLU, CREAU in the last 72 hours.     No lab exists for component: PROU    Total time spent with patient:           Care Plan discussed with:        Nicolle Marroquin MD

## 2022-08-26 NOTE — PROGRESS NOTES
[facility] receiving one unit of PRBCs per order. Pt's blood pressure on the soft side. Parag was placd on standby by dayshift nurse.

## 2022-08-27 ENCOUNTER — APPOINTMENT (OUTPATIENT)
Dept: GENERAL RADIOLOGY | Age: 42
DRG: 853 | End: 2022-08-27
Attending: STUDENT IN AN ORGANIZED HEALTH CARE EDUCATION/TRAINING PROGRAM
Payer: MEDICARE

## 2022-08-27 LAB
ALBUMIN SERPL-MCNC: 2 G/DL (ref 3.5–5)
ALBUMIN/GLOB SERPL: 0.4 {RATIO} (ref 1.1–2.2)
ALP SERPL-CCNC: 130 U/L (ref 45–117)
ALT SERPL-CCNC: 32 U/L (ref 12–78)
ANION GAP SERPL CALC-SCNC: 8 MMOL/L (ref 5–15)
AST SERPL-CCNC: 19 U/L (ref 15–37)
BASOPHILS # BLD: 0.1 K/UL (ref 0–0.1)
BASOPHILS NFR BLD: 0 % (ref 0–1)
BILIRUB SERPL-MCNC: 0.5 MG/DL (ref 0.2–1)
BUN SERPL-MCNC: 64 MG/DL (ref 6–20)
BUN/CREAT SERPL: 30 (ref 12–20)
CALCIUM SERPL-MCNC: 8.3 MG/DL (ref 8.5–10.1)
CHLORIDE SERPL-SCNC: 101 MMOL/L (ref 97–108)
CO2 SERPL-SCNC: 27 MMOL/L (ref 21–32)
CREAT SERPL-MCNC: 2.1 MG/DL (ref 0.7–1.3)
DIFFERENTIAL METHOD BLD: ABNORMAL
EOSINOPHIL # BLD: 0.2 K/UL (ref 0–0.4)
EOSINOPHIL NFR BLD: 2 % (ref 0–7)
ERYTHROCYTE [DISTWIDTH] IN BLOOD BY AUTOMATED COUNT: 15.9 % (ref 11.5–14.5)
GLOBULIN SER CALC-MCNC: 5 G/DL (ref 2–4)
GLUCOSE SERPL-MCNC: 138 MG/DL (ref 65–100)
HCT VFR BLD AUTO: 25.9 % (ref 36.6–50.3)
HGB BLD-MCNC: 8.5 G/DL (ref 12.1–17)
IMM GRANULOCYTES # BLD AUTO: 0.2 K/UL (ref 0–0.04)
IMM GRANULOCYTES NFR BLD AUTO: 1 % (ref 0–0.5)
LYMPHOCYTES # BLD: 1 K/UL (ref 0.8–3.5)
LYMPHOCYTES NFR BLD: 7 % (ref 12–49)
MAGNESIUM SERPL-MCNC: 2.1 MG/DL (ref 1.6–2.4)
MCH RBC QN AUTO: 28.8 PG (ref 26–34)
MCHC RBC AUTO-ENTMCNC: 32.8 G/DL (ref 30–36.5)
MCV RBC AUTO: 87.8 FL (ref 80–99)
MONOCYTES # BLD: 1.1 K/UL (ref 0–1)
MONOCYTES NFR BLD: 8 % (ref 5–13)
NEUTS SEG # BLD: 11.3 K/UL (ref 1.8–8)
NEUTS SEG NFR BLD: 82 % (ref 32–75)
NRBC # BLD: 0 K/UL (ref 0–0.01)
NRBC BLD-RTO: 0 PER 100 WBC
PLATELET # BLD AUTO: 367 K/UL (ref 150–400)
PMV BLD AUTO: 10.4 FL (ref 8.9–12.9)
POTASSIUM SERPL-SCNC: 3.5 MMOL/L (ref 3.5–5.1)
PROT SERPL-MCNC: 7 G/DL (ref 6.4–8.2)
RBC # BLD AUTO: 2.95 M/UL (ref 4.1–5.7)
SODIUM SERPL-SCNC: 136 MMOL/L (ref 136–145)
WBC # BLD AUTO: 13.9 K/UL (ref 4.1–11.1)

## 2022-08-27 PROCEDURE — 74011250636 HC RX REV CODE- 250/636: Performed by: STUDENT IN AN ORGANIZED HEALTH CARE EDUCATION/TRAINING PROGRAM

## 2022-08-27 PROCEDURE — 74011250637 HC RX REV CODE- 250/637: Performed by: STUDENT IN AN ORGANIZED HEALTH CARE EDUCATION/TRAINING PROGRAM

## 2022-08-27 PROCEDURE — 74011250637 HC RX REV CODE- 250/637: Performed by: NURSE PRACTITIONER

## 2022-08-27 PROCEDURE — 74018 RADEX ABDOMEN 1 VIEW: CPT

## 2022-08-27 PROCEDURE — 74011250636 HC RX REV CODE- 250/636: Performed by: NURSE PRACTITIONER

## 2022-08-27 PROCEDURE — 74011000250 HC RX REV CODE- 250: Performed by: NURSE PRACTITIONER

## 2022-08-27 PROCEDURE — 94003 VENT MGMT INPAT SUBQ DAY: CPT

## 2022-08-27 PROCEDURE — 74011250637 HC RX REV CODE- 250/637: Performed by: INTERNAL MEDICINE

## 2022-08-27 PROCEDURE — 83735 ASSAY OF MAGNESIUM: CPT

## 2022-08-27 PROCEDURE — 65620000000 HC RM CCU GENERAL

## 2022-08-27 PROCEDURE — 80053 COMPREHEN METABOLIC PANEL: CPT

## 2022-08-27 PROCEDURE — 36415 COLL VENOUS BLD VENIPUNCTURE: CPT

## 2022-08-27 PROCEDURE — 71045 X-RAY EXAM CHEST 1 VIEW: CPT

## 2022-08-27 PROCEDURE — 74011636637 HC RX REV CODE- 636/637: Performed by: NURSE PRACTITIONER

## 2022-08-27 PROCEDURE — 85025 COMPLETE CBC W/AUTO DIFF WBC: CPT

## 2022-08-27 PROCEDURE — 77030041530

## 2022-08-27 RX ORDER — QUETIAPINE FUMARATE 25 MG/1
50 TABLET, FILM COATED ORAL
Status: ACTIVE | OUTPATIENT
Start: 2022-08-26 | End: 2022-08-27

## 2022-08-27 RX ADMIN — MEXILETINE HYDROCHLORIDE 150 MG: 150 CAPSULE ORAL at 03:17

## 2022-08-27 RX ADMIN — PHENYLEPHRINE HYDROCHLORIDE 35 MCG/MIN: 10 INJECTION INTRAVENOUS at 10:00

## 2022-08-27 RX ADMIN — ATORVASTATIN CALCIUM 40 MG: 40 TABLET, FILM COATED ORAL at 21:15

## 2022-08-27 RX ADMIN — BUMETANIDE 0.5 MG: 1 TABLET ORAL at 10:01

## 2022-08-27 RX ADMIN — MIDODRINE HYDROCHLORIDE 20 MG: 5 TABLET ORAL at 21:15

## 2022-08-27 RX ADMIN — COLLAGENASE SANTYL: 250 OINTMENT TOPICAL at 10:01

## 2022-08-27 RX ADMIN — MINERAL SUPPLEMENT IRON 300 MG / 5 ML STRENGTH LIQUID 100 PER BOX UNFLAVORED 300 MG: at 09:57

## 2022-08-27 RX ADMIN — MIDODRINE HYDROCHLORIDE 20 MG: 5 TABLET ORAL at 14:23

## 2022-08-27 RX ADMIN — Medication 10 ML: at 21:15

## 2022-08-27 RX ADMIN — CHOLESTYRAMINE 4 G: 4 POWDER, FOR SUSPENSION ORAL at 14:23

## 2022-08-27 RX ADMIN — ONDANSETRON HYDROCHLORIDE 4 MG: 2 INJECTION, SOLUTION INTRAMUSCULAR; INTRAVENOUS at 11:41

## 2022-08-27 RX ADMIN — Medication 10 ML: at 14:23

## 2022-08-27 RX ADMIN — MIDODRINE HYDROCHLORIDE 20 MG: 5 TABLET ORAL at 05:33

## 2022-08-27 RX ADMIN — FLUDROCORTISONE ACETATE 0.1 MG: 0.1 TABLET ORAL at 09:57

## 2022-08-27 RX ADMIN — LEVOTHYROXINE SODIUM 50 MCG: 0.05 TABLET ORAL at 05:33

## 2022-08-27 RX ADMIN — CHOLESTYRAMINE 4 G: 4 POWDER, FOR SUSPENSION ORAL at 18:47

## 2022-08-27 RX ADMIN — QUETIAPINE FUMARATE 100 MG: 100 TABLET ORAL at 21:15

## 2022-08-27 RX ADMIN — CHLORHEXIDINE GLUCONATE 15 ML: 1.2 RINSE ORAL at 10:01

## 2022-08-27 RX ADMIN — APIXABAN 5 MG: 5 TABLET, FILM COATED ORAL at 14:23

## 2022-08-27 RX ADMIN — Medication 5 UNITS: at 09:57

## 2022-08-27 RX ADMIN — CHLORHEXIDINE GLUCONATE 15 ML: 1.2 RINSE ORAL at 21:16

## 2022-08-27 RX ADMIN — MINERAL SUPPLEMENT IRON 300 MG / 5 ML STRENGTH LIQUID 100 PER BOX UNFLAVORED 300 MG: at 18:47

## 2022-08-27 RX ADMIN — MEXILETINE HYDROCHLORIDE 150 MG: 150 CAPSULE ORAL at 18:47

## 2022-08-27 RX ADMIN — MINERAL SUPPLEMENT IRON 300 MG / 5 ML STRENGTH LIQUID 100 PER BOX UNFLAVORED 300 MG: at 14:23

## 2022-08-27 RX ADMIN — CHOLESTYRAMINE 4 G: 4 POWDER, FOR SUSPENSION ORAL at 09:58

## 2022-08-27 RX ADMIN — HYDROMORPHONE HYDROCHLORIDE 0.5 MG: 1 INJECTION, SOLUTION INTRAMUSCULAR; INTRAVENOUS; SUBCUTANEOUS at 02:03

## 2022-08-27 NOTE — PROGRESS NOTES
Name: Thu Whitman MRN: [id number]   : 1980 Hospital: [facility]   Date: 2022        IMPRESSION:   YADY, started on iHD initially. Currently off HD. Scr is stable. Patient is non oliguric. Hypotension resolved, off iv pressor, on midodrine  Hypophosphatemia- resolved  Debility with multiple pressure ulcers  Respiratory failure - on vent, weaning  Anemia of chronic disease-   Hyperkalemia - resolved with RRT  Protein deficiency  Wounds  Hypokalemia - corrected. K - 3.5  S/p colostomy  to keep the sacral wounds clean      PLAN:   S/p HD , no need for HD, Creatinine is stable , good urine output  Bumex iv on hold since . Start po bumex  Replete potassium  Follow lytes. Midodrine 20 mg tid   Epo increased to 14K/week, po Iron  Tube feeding nepro  Dose meds for his GFR. Avoid NSAIDs + IV contrast.  Abx per ICU team     Subjective/Interval History:   I have reviewed the flowsheet and previous days notes. Seen on CRRT, awake and alert, denies pain/SOB with nodding head      F/U - YADY , CRRT --> 2022    Remains on CRRT + pressors. No new complaints were offered.  seen on CRRT, d/w ICU RN   CRRT stopped, BP stable on minimal Levophed, getting NeutraPhos  8/15 awake and alert, on vent. BP stable, had 175 ml UO yesterday, 150 ml today sofar. Cr 1.1. will add bumex if BP toleerates, no HD today  , awake on vent. UO has increased had 1 lit yesterday with bumex, cr increasing. BP stable, will hold HD today again   sleeping on vent, arousable, BP stable in 120s, had 1100 ml u/o and 500 overnight. Cr higher  , awake, off vent on trach collar, pulled his manjula this am, UO up       - F/U - YADY, Hx of HD --> 22    No new complaints. 22 - F/U - Yady , Hx of HD --> 22    No complaints. 22 - F/U YADY , Hx of HD    Unable to obtain the ROS. 22 --> F/U YADY, Hx of HD  No new complaints.      awake and alert, on trach collar, good uo, Cr stable   Pt seen and examined , back on vent, cr improving, BUN higher    22 --> F/U YADY, Hx of HD    On vent support    , awake, on vent, had colostomy, good urine output, cr stable    22 Seems to be comfortable. Resting in bed, asleep. No acute events are reported. Objective:   Vital Signs:    Visit Vitals  BP 91/67   Pulse (!) 106   Temp 98 °F (36.7 °C)   Resp 18   Ht 5' 8\" (1.727 m)   Wt 61.1 kg (134 lb 11.2 oz)   SpO2 100%   BMI 20.48 kg/m²       O2 Device: Tracheal collar   O2 Flow Rate (L/min): 10 l/min   Temp (24hrs), Av.1 °F (36.7 °C), Min:98 °F (36.7 °C), Max:98.2 °F (36.8 °C)       Intake/Output:   Last shift:      No intake/output data recorded. Last 3 shifts:  1901 -  0700  In: 2675.8 [P.O.:800; I.V.:599.6]  Out: 3280 [Urine:2550; Drains:250]    Intake/Output Summary (Last 24 hours) at 2022 1046  Last data filed at 2022 0700  Gross per 24 hour   Intake 987.63 ml   Output 2300 ml   Net -1312.37 ml          Physical Exam:      Seen in CCU - Bed . General:     Awake and alert on vent support                         Head:   Normocephalic,  bitemporal muscle wasting. Eyes:   Conjunctivae/corneas clear. Neck:  Trach    Lungs:   no wheezes, no rales, no rhonchi. Heart:   Reg, No S 3 gallop, no pericardial rub  . Abdomen:   Not distended.                             PEG, suprapubic urinary cath, colostomy    Extremities: Muscle wasting --> 2 + upper thigh                          Leg oedema -> none     Skin:     Eschar - heel., Large sacral wound     Psych:  Calm    Neurologic: Awake and interacting appropriately    DATA:  Labs:  Recent Labs     22  0202 22  0416 22  0526    140 141   K 3.5 2.9* 3.2*    105 105   CO2 27 27 28   BUN 64* 73* 71*   CREA 2.10* 2.17* 2.18*   CA 8.3* 8.6 8.5   ALB 2.0* 2.1* 2.0*   PHOS  --  2.8 3.2   MG 2.1 2.2 2.1       Recent Labs     22  0202 22  0416 22  0526 WBC 13.9* 11.7* 4.7   HGB 8.5* 8.5* 6.5*   HCT 25.9* 26.2* 19.8*    404* 312       No results for input(s): YAMIL, HILDA, BRODIE, CHELSEA in the last 72 hours.     No lab exists for component: PROU    Total time spent with patient:           Care Plan discussed with:        Luke Castaneda MD

## 2022-08-27 NOTE — PROGRESS NOTES
1930: Bedside report received from [facility]  0730:  Bedside report given to [facility]

## 2022-08-27 NOTE — PROGRESS NOTES
0730 Shift report received from [facility] RN    0800 Pt very lethargic, able to nod appropriately    0930 Vent alarming, patient found holding trach, tachypneic but O2 sats still > 95%  Trach site bagged, MD at bedside, able to replace trach w/ 6 Shiley without issue. 1000 Trach collar trial per MD    1230 Pt tachypneic, HR sustaining 120s, BP dropping, pt using accessory muscles. Absent breath sounds in bilateral middle and lower lobes. Placed back on vent, cuff inflated. Rate + breath sounds improved. Patient throwing up, nods yes when asked if he has nausea. Given PRN zofran    1600 Pt throwing up again. MD made aware, KUB obtained.     1930 Shift report given to [facility] TRA

## 2022-08-27 NOTE — PROGRESS NOTES
CRITICAL CARE NOTE      Name: Arlen Alford   : 1980   MRN: [id number]   Date: 2022      REASON FOR ICU ADMISSION:  Acute renal failure, chronic vent dependence, septic shock     PRINCIPAL ICU DIAGNOSIS   Septic shock, unclear etiology  Acute renal injury  Chronic hypoxic respiratory failure, s/p trach, vent dependent  Chronic decubitus pressure injury ulcers, present on admission  HFrEF (20-25%)  Afib  Chronic hypotension  Paraplegia  S/p colostomy    BRIEF PATIENT SUMMARY   66-year-old male with known past medical history of multiple chronic issues as listed in problem list, who presented to [address] [place] [address] ED after being sent from [facility] for possible need for CRRT for YADY due to inability to tolerate iHD due to sepsis/septic shock from unclear source. Patient remained intact improvement weaned off vasopressor support, transition to intermittent HD from CRRT. Patient with renal recovery, no longer requiring HD. Patient with multiple weaning attempts, occasionally tolerating trach collar however either tires out or has secretion issues requiring being placed back on vent. Status post colostomy placement  for assistance in maintaining chronic wounds clean, assist healing. Pending LTAC placement for continued vent weaning, wound management    COMPREHENSIVE ASSESSMENT & PLAN:SYSTEM BASED     24 HOUR EVENTS:   Remains on low will gtt and MV, weaning support as tolerated. No acute events    NEUROLOGICAL:   -Awake and alert in no acute distress  Continue Seroquel  - consider depakote if continues to remove lines, trach  -history of paraplegia  - PT/OT SLP consulted  - Dysphagia with GJ tube in place, however cleared for PO by SLP    PULMONOLOGY:   -Chronic respiratory failure with trach in place ( placed 2022)  -Vent wean as tolerated.   Trach collar as tolerated  -PRN nebs/suctioning  -ABCDEF protocol    CARDIOVASCULAR:   -Wean phenylephrine  - Atrial fibrillation on eliquis, resume  -Non-ischemic cardiomyopathy with EF 15-20%  -Recent PEA arrest (multiple)  -Pulmonary hypertension with right ventricular dysfunction  -Continue midodrine, florinef, mexiletine. PRN midodrine 10mg pre-HD  - holding entresto due to hypotension  -TTE ordered for preop work-up diverting colostomy next week-shows LVEF of 15 to 20%. - PO bumex    GASTROINTESTINAL     -Likely cyclic tube feeds as the patient is eating.  - calorie count <50% daily needs, begin full TF. Allow PO intake as tolerated. RENAL/ELECTROLYTE/FLUIDS:     -Last dialysis on 8/18. No acute needs for renal placement therapy.   -Trend lytes and replete as needed  - PO bumex  -Florinef, midodrine, EPO  -Suprapubic catheter care    ENDOCRINE:   -Continue synthroid    HEMATOLOGY/ONCOLOGY:   -Eliquis     INFECTIOUS DISEASE:   S/p Vanc/Zosyn for 10 days. Wound cultures polymicrobial with light MRSA and MDR organisms. Respiratory cultures growing light Proteus mirabilis and achromobacter. Wound Vac placed, wound care managing    status post diverting ostomy 8/24 with general surgery for wound healing. ICU DAILY CHECKLIST     Code Status:Partial (No Shocks, No Compressions) Meds okay  DVT Prophylaxis:Eliquis  T/L/D:  GJ tube, Colostomy, suprapubic catheter, PIV  SUP: None  Diet: Tube Feed,PO as tolerated  Activity Level:Paraplegia  ABCDEF Bundle/Checklist Completed:Yes  Disposition: Stay in ICU. Initiate discharge planning. Multidisciplinary Rounds Completed: Yes  Patient/Family Updated: Yes       HOSPITAL COURSE/DAILY EVENT LOG   As noted above    SUBJECTIVE   Review of Systems   Constitutional:  Negative for chills, diaphoresis and fever. HENT:  Negative for congestion, ear pain, hearing loss, nosebleeds, sinus pain and sore throat. Eyes:  Negative for blurred vision, double vision and pain. Respiratory:  Negative for cough, sputum production, shortness of breath and wheezing. Cardiovascular:  Negative for chest pain, palpitations and orthopnea. Gastrointestinal:  Negative for abdominal pain, constipation, heartburn, nausea and vomiting. Genitourinary:  Negative for hematuria. Musculoskeletal:  Negative for falls, joint pain and myalgias. Skin:  Negative for itching and rash. Neurological:  Negative for dizziness, tremors, speech change, focal weakness, weakness and headaches. Endo/Heme/Allergies:  Does not bruise/bleed easily. Psychiatric/Behavioral:  Negative for depression. OBJECTIVE     Labs and Data: Reviewed 08/27/22  Medications: Reviewed 08/27/22  Imaging: Reviewed 08/27/22    Physical Exam  Vitals and nursing note reviewed. Constitutional:       General: He is awake. He is not in acute distress. Appearance: He is underweight. HENT:      Head: Normocephalic and atraumatic. Nose: Nose normal.      Mouth/Throat:      Comments: trach  Eyes:      General: Lids are normal.      Conjunctiva/sclera: Conjunctivae normal.      Pupils: Pupils are equal, round, and reactive to light. Neck:     Cardiovascular:      Rate and Rhythm: Normal rate. Rhythm irregularly irregular. Heart sounds: Normal heart sounds, S1 normal and S2 normal. No murmur heard. No friction rub. No gallop. Pulmonary:      Effort: Pulmonary effort is normal. No tachypnea. Breath sounds: Normal breath sounds and air entry. Comments: On vent  Abdominal:      General: The ostomy site is clean. Bowel sounds are normal.          Comments: colostomy   Musculoskeletal:      Right lower leg: No edema. Left lower leg: No edema. Comments: Chronic numerous pressure ulcer wounds throughout, present on admission see wound care note for further details   Skin:     General: Skin is warm and dry. Capillary Refill: Capillary refill takes 2 to 3 seconds. Coloration: Skin is pale. Findings: Wound present. Neurological:      Mental Status: He is alert, oriented to person, place, and time and easily aroused.  Mental status is at baseline. GCS: GCS eye subscore is 4. GCS verbal subscore is 5. GCS motor subscore is 6. Psychiatric:         Behavior: Behavior is cooperative. Visit Vitals  BP 91/67   Pulse (!) 105   Temp 98 °F (36.7 °C)   Resp 18   Ht 5' 8\" (1.727 m)   Wt 61.1 kg (134 lb 11.2 oz)   SpO2 100%   BMI 20.48 kg/m²    O2 Flow Rate (L/min): 10 l/min O2 Device: Tracheostomy, Ventilator Temp (24hrs), Av.1 °F (36.7 °C), Min:98 °F (36.7 °C), Max:98.2 °F (36.8 °C)           Intake/Output:     Intake/Output Summary (Last 24 hours) at 2022 0753  Last data filed at 2022 0700  Gross per 24 hour   Intake 1847.63 ml   Output 2350 ml   Net -502.37 ml         Imaging       Pertinent imaging reviewed and interpreted independently as noted above    CRITICAL CARE DOCUMENTATION  I had a face to face encounter with the patient, reviewed and interpreted patient data including clinical events, labs, images, vital signs, I/O's, and examined patient. I have discussed the case and the plan and management of the patient's care with the consulting services, the bedside nurses and the respiratory therapist.      NOTE OF PERSONAL INVOLVEMENT IN CARE   This patient has a high probability of imminent, clinically significant deterioration, which requires the highest level of preparedness to intervene urgently. I participated in the decision-making and personally managed or directed the management of the following life and organ supporting interventions that required my frequent assessment to treat or prevent imminent deterioration. I personally spent 45 minutes of critical care time. This is time spent at this critically ill patient's bedside actively involved in patient care as well as the coordination of care. This does not include any procedural time which has been billed separately. Abe Candelario MD  Staff [facility]

## 2022-08-28 LAB
ALBUMIN SERPL-MCNC: 1.8 G/DL (ref 3.5–5)
ALBUMIN/GLOB SERPL: 0.4 {RATIO} (ref 1.1–2.2)
ALP SERPL-CCNC: 117 U/L (ref 45–117)
ALT SERPL-CCNC: 26 U/L (ref 12–78)
ANION GAP SERPL CALC-SCNC: 8 MMOL/L (ref 5–15)
AST SERPL-CCNC: 17 U/L (ref 15–37)
BASOPHILS # BLD: 0.1 K/UL (ref 0–0.1)
BASOPHILS NFR BLD: 0 % (ref 0–1)
BILIRUB SERPL-MCNC: 0.4 MG/DL (ref 0.2–1)
BUN SERPL-MCNC: 70 MG/DL (ref 6–20)
BUN/CREAT SERPL: 34 (ref 12–20)
CALCIUM SERPL-MCNC: 8.4 MG/DL (ref 8.5–10.1)
CHLORIDE SERPL-SCNC: 104 MMOL/L (ref 97–108)
CO2 SERPL-SCNC: 26 MMOL/L (ref 21–32)
CREAT SERPL-MCNC: 2.07 MG/DL (ref 0.7–1.3)
DIFFERENTIAL METHOD BLD: ABNORMAL
EOSINOPHIL # BLD: 0.3 K/UL (ref 0–0.4)
EOSINOPHIL NFR BLD: 2 % (ref 0–7)
ERYTHROCYTE [DISTWIDTH] IN BLOOD BY AUTOMATED COUNT: 16 % (ref 11.5–14.5)
GLOBULIN SER CALC-MCNC: 4.6 G/DL (ref 2–4)
GLUCOSE SERPL-MCNC: 163 MG/DL (ref 65–100)
HCT VFR BLD AUTO: 22.9 % (ref 36.6–50.3)
HGB BLD-MCNC: 7.4 G/DL (ref 12.1–17)
IMM GRANULOCYTES # BLD AUTO: 0.1 K/UL (ref 0–0.04)
IMM GRANULOCYTES NFR BLD AUTO: 1 % (ref 0–0.5)
LYMPHOCYTES # BLD: 1 K/UL (ref 0.8–3.5)
LYMPHOCYTES NFR BLD: 6 % (ref 12–49)
MAGNESIUM SERPL-MCNC: 1.9 MG/DL (ref 1.6–2.4)
MCH RBC QN AUTO: 28.4 PG (ref 26–34)
MCHC RBC AUTO-ENTMCNC: 32.3 G/DL (ref 30–36.5)
MCV RBC AUTO: 87.7 FL (ref 80–99)
MONOCYTES # BLD: 1.2 K/UL (ref 0–1)
MONOCYTES NFR BLD: 8 % (ref 5–13)
NEUTS SEG # BLD: 13.7 K/UL (ref 1.8–8)
NEUTS SEG NFR BLD: 83 % (ref 32–75)
NRBC # BLD: 0 K/UL (ref 0–0.01)
NRBC BLD-RTO: 0 PER 100 WBC
PLATELET # BLD AUTO: 346 K/UL (ref 150–400)
PMV BLD AUTO: 10.1 FL (ref 8.9–12.9)
POTASSIUM SERPL-SCNC: 3.1 MMOL/L (ref 3.5–5.1)
PROT SERPL-MCNC: 6.4 G/DL (ref 6.4–8.2)
RBC # BLD AUTO: 2.61 M/UL (ref 4.1–5.7)
SODIUM SERPL-SCNC: 138 MMOL/L (ref 136–145)
WBC # BLD AUTO: 16.4 K/UL (ref 4.1–11.1)

## 2022-08-28 PROCEDURE — 74011250636 HC RX REV CODE- 250/636: Performed by: STUDENT IN AN ORGANIZED HEALTH CARE EDUCATION/TRAINING PROGRAM

## 2022-08-28 PROCEDURE — 85025 COMPLETE CBC W/AUTO DIFF WBC: CPT

## 2022-08-28 PROCEDURE — 74011250637 HC RX REV CODE- 250/637: Performed by: STUDENT IN AN ORGANIZED HEALTH CARE EDUCATION/TRAINING PROGRAM

## 2022-08-28 PROCEDURE — 83735 ASSAY OF MAGNESIUM: CPT

## 2022-08-28 PROCEDURE — 74011250637 HC RX REV CODE- 250/637: Performed by: INTERNAL MEDICINE

## 2022-08-28 PROCEDURE — 74011250637 HC RX REV CODE- 250/637: Performed by: ANESTHESIOLOGY

## 2022-08-28 PROCEDURE — 74011000250 HC RX REV CODE- 250: Performed by: ANESTHESIOLOGY

## 2022-08-28 PROCEDURE — 74011000250 HC RX REV CODE- 250: Performed by: NURSE PRACTITIONER

## 2022-08-28 PROCEDURE — 94003 VENT MGMT INPAT SUBQ DAY: CPT

## 2022-08-28 PROCEDURE — 74011636637 HC RX REV CODE- 636/637: Performed by: NURSE PRACTITIONER

## 2022-08-28 PROCEDURE — 74011250637 HC RX REV CODE- 250/637: Performed by: NURSE PRACTITIONER

## 2022-08-28 PROCEDURE — 36415 COLL VENOUS BLD VENIPUNCTURE: CPT

## 2022-08-28 PROCEDURE — 80053 COMPREHEN METABOLIC PANEL: CPT

## 2022-08-28 PROCEDURE — 65620000000 HC RM CCU GENERAL

## 2022-08-28 RX ORDER — POTASSIUM CHLORIDE 29.8 MG/ML
20 INJECTION INTRAVENOUS
Status: DISCONTINUED | OUTPATIENT
Start: 2022-08-28 | End: 2022-08-28

## 2022-08-28 RX ORDER — BUMETANIDE 0.25 MG/ML
1 INJECTION INTRAMUSCULAR; INTRAVENOUS ONCE
Status: COMPLETED | OUTPATIENT
Start: 2022-08-28 | End: 2022-08-28

## 2022-08-28 RX ORDER — QUETIAPINE FUMARATE 25 MG/1
50 TABLET, FILM COATED ORAL 2 TIMES DAILY
Status: DISCONTINUED | OUTPATIENT
Start: 2022-08-28 | End: 2022-08-28

## 2022-08-28 RX ORDER — DIPHENHYDRAMINE HCL 25 MG
25 CAPSULE ORAL
Status: DISCONTINUED | OUTPATIENT
Start: 2022-08-28 | End: 2022-09-01 | Stop reason: HOSPADM

## 2022-08-28 RX ADMIN — POTASSIUM CHLORIDE 20 MEQ: 400 INJECTION, SOLUTION INTRAVENOUS at 05:43

## 2022-08-28 RX ADMIN — APIXABAN 5 MG: 5 TABLET, FILM COATED ORAL at 03:46

## 2022-08-28 RX ADMIN — BUMETANIDE 0.5 MG: 1 TABLET ORAL at 09:01

## 2022-08-28 RX ADMIN — MINERAL SUPPLEMENT IRON 300 MG / 5 ML STRENGTH LIQUID 100 PER BOX UNFLAVORED 300 MG: at 09:01

## 2022-08-28 RX ADMIN — CHOLESTYRAMINE 4 G: 4 POWDER, FOR SUSPENSION ORAL at 11:06

## 2022-08-28 RX ADMIN — CHLORHEXIDINE GLUCONATE 15 ML: 1.2 RINSE ORAL at 11:06

## 2022-08-28 RX ADMIN — PHENYLEPHRINE HYDROCHLORIDE 40 MCG/MIN: 10 INJECTION INTRAVENOUS at 01:01

## 2022-08-28 RX ADMIN — POTASSIUM BICARBONATE 40 MEQ: 782 TABLET, EFFERVESCENT ORAL at 05:45

## 2022-08-28 RX ADMIN — POTASSIUM CHLORIDE 20 MEQ: 400 INJECTION, SOLUTION INTRAVENOUS at 07:08

## 2022-08-28 RX ADMIN — MINERAL SUPPLEMENT IRON 300 MG / 5 ML STRENGTH LIQUID 100 PER BOX UNFLAVORED 300 MG: at 11:06

## 2022-08-28 RX ADMIN — MIDODRINE HYDROCHLORIDE 20 MG: 5 TABLET ORAL at 21:03

## 2022-08-28 RX ADMIN — APIXABAN 5 MG: 5 TABLET, FILM COATED ORAL at 15:05

## 2022-08-28 RX ADMIN — DIPHENHYDRAMINE HYDROCHLORIDE 25 MG: 25 CAPSULE ORAL at 21:03

## 2022-08-28 RX ADMIN — LEVOTHYROXINE SODIUM 50 MCG: 0.05 TABLET ORAL at 05:43

## 2022-08-28 RX ADMIN — CHLORHEXIDINE GLUCONATE 15 ML: 1.2 RINSE ORAL at 21:04

## 2022-08-28 RX ADMIN — CHOLESTYRAMINE 4 G: 4 POWDER, FOR SUSPENSION ORAL at 09:01

## 2022-08-28 RX ADMIN — MEXILETINE HYDROCHLORIDE 150 MG: 150 CAPSULE ORAL at 15:05

## 2022-08-28 RX ADMIN — MIDODRINE HYDROCHLORIDE 20 MG: 5 TABLET ORAL at 15:05

## 2022-08-28 RX ADMIN — BUMETANIDE 1 MG: 0.25 INJECTION, SOLUTION INTRAMUSCULAR; INTRAVENOUS at 15:04

## 2022-08-28 RX ADMIN — Medication 10 ML: at 15:05

## 2022-08-28 RX ADMIN — MEXILETINE HYDROCHLORIDE 150 MG: 150 CAPSULE ORAL at 03:46

## 2022-08-28 RX ADMIN — DIPHENHYDRAMINE HYDROCHLORIDE 25 MG: 25 CAPSULE ORAL at 15:08

## 2022-08-28 RX ADMIN — ATORVASTATIN CALCIUM 40 MG: 40 TABLET, FILM COATED ORAL at 21:03

## 2022-08-28 RX ADMIN — Medication 5 UNITS: at 09:01

## 2022-08-28 RX ADMIN — CHOLESTYRAMINE 4 G: 4 POWDER, FOR SUSPENSION ORAL at 17:00

## 2022-08-28 RX ADMIN — COLLAGENASE SANTYL: 250 OINTMENT TOPICAL at 11:07

## 2022-08-28 RX ADMIN — MINERAL SUPPLEMENT IRON 300 MG / 5 ML STRENGTH LIQUID 100 PER BOX UNFLAVORED 300 MG: at 17:00

## 2022-08-28 RX ADMIN — HYDROMORPHONE HYDROCHLORIDE 0.5 MG: 1 INJECTION, SOLUTION INTRAMUSCULAR; INTRAVENOUS; SUBCUTANEOUS at 15:01

## 2022-08-28 RX ADMIN — Medication 10 ML: at 21:03

## 2022-08-28 RX ADMIN — FLUDROCORTISONE ACETATE 0.1 MG: 0.1 TABLET ORAL at 09:01

## 2022-08-28 RX ADMIN — MIDODRINE HYDROCHLORIDE 20 MG: 5 TABLET ORAL at 05:43

## 2022-08-28 RX ADMIN — POTASSIUM BICARBONATE 20 MEQ: 782 TABLET, EFFERVESCENT ORAL at 11:06

## 2022-08-28 NOTE — PROGRESS NOTES
1930: Bedside report received from [address]: Pt with no UO for shift, suprapubic cath irrigated with sterile saline, immediate UO of 200mls when hooked to loya bag.

## 2022-08-28 NOTE — PROGRESS NOTES
0730 Shift report received from [address] [place] Pt turned onto left side, patient immediately panicking, HR up to 130s, RR 40s. Suctioned but pt WOB still increased. Nods yes when asked if having difficulty breathing. Repositioned higher in bed and turned to right side. Pt breathing with vent, much more comfortable. Breath sounds significantly more diminished on Left compared to right    0900 Midline terminating in axilla, swapping K+ repletion to PO. MD made aware, plan to stop will.

## 2022-08-28 NOTE — PROGRESS NOTES
[facility]    ICU TEAM Progress Note    Name: Wilfrido Daily   : 1980   MRN: [id number]   Date: 2022      Assessment and Plan:     ICU Problems:  Chronic respiratory failure requiring mechanical ventilation  HFrEF  Volume overload  Chronic Renal failure  Chronic decubitus ulcers  Multidrug resistant organism colonization  At risk for sepsis  Hypotension      Briefly,pt is 68-year-old male with known past medical history of multiple chronic issues as listed in problem list, who presented to [facility] ED after being sent from [facility] for possible need for CRRT for YADY due to inability to tolerate iHD due to sepsis/septic shock from unclear source. Patient remained intact improvement weaned off vasopressor support, transition to intermittent HD from CRRT. Patient with renal recovery, no longer requiring HD. Patient with multiple weaning attempts, occasionally tolerating trach collar however either tires out or has secretion issues requiring being placed back on vent. Status post colostomy placement  for assistance in maintaining chronic wounds clean, assist healing. For Chronic respiratory failure requiring mechanical ventilation will continue ventilator support. Clearly volume overloaded with large L sided pleural effusion. Given multidrug resistant organisms, ongoing eliquis, and chronic nature will defer drainage at his point. On PO bumex but will provide 1 x IV to assist in removal      For HFrEF will monitor. Poor prognosis. Partial code with NO DEFIBRILLATIONS    For Volume overload bumex PO and IV today    For Renal failure will monitor    For Chronic decubitus ulcers will turn as tolerated. Monitor for clinical signs of infection given below      For Hypotension. I have discontinued Seroquel in case it is contributing.   We have weaned off phenylephrine infusion    ICU Checklist       F - Feeding:  yes   A - Analgesia: Norco PRN  S - Sedation: None  T - DVT Prophylaxis: eliquis H - Head of Bed: > 30 Degrees  U - Ulcer Prophylaxis: Pepcid (famotidine)   G - Glycemic Control: Insulin  S - Spontaneous Breathing Trial: Yes  B - Bowel Regimen: MiraLax  I - Indwelling Catheter:   Tubes: Tracheostomy  Lines: PICC Line  Drains: None          POD:  4 Days Post-Op    S/P:   Procedure(s):  LAPAROSCOPIC COLOSTOMY    Active Problem List:     Problem List  Never Reviewed            Codes Class    Moderate protein-calorie malnutrition (HCC) ICD-10-CM: E44.0  ICD-9-CM: 263.0         HFrEF (heart failure with reduced ejection fraction) ([facility]) ICD-10-CM: I50.20  ICD-9-CM: 428.20         [facility]acute [facility]) [facility] ICD-10-CM: N17.9  ICD-9-CM: 070. 9            Past Medical History:      has no past medical history on file. Past Surgical History:      has no past surgical history on file. Home Medications:     Prior to Admission medications    Medication Sig Start Date End Date Taking? Authorizing Provider   atorvastatin (LIPITOR) 40 mg tablet Take 40 mg by mouth nightly. PTA Med List from [facility] 8/8/22   Yes Provider, Historical   albuterol (PROVENTIL VENTOLIN) 2.5 mg /3 mL (0.083 %) nebu 2.5 mg by Nebulization route three (3) times daily as needed for Wheezing. PTA Med List from [facility] 8/8/22   Yes Provider, Historical   albuterol (PROVENTIL VENTOLIN) 2.5 mg /3 mL (0.083 %) nebu 2.5 mg by Nebulization route two (2) times a day. PTA Med List from [facility] 8/8/22   Yes Provider, Historical   sacubitriL-valsartan Elisasuma Iraheta) 24-26 mg tablet Take 0.5 Tablets by mouth two (2) times a day. PTA Med List from [facility] 8/8/22   Yes Provider, Historical   apixaban (Eliquis) 5 mg tablet Take 5 mg by mouth two (2) times a day. PTA Med List from [facility] 8/8/22   Yes Provider, Historical   ferrous sulfate 300 mg (60 mg iron)/5 mL syrup Take 300 mg by mouth in the morning. PTA Med List from [facility] 8/8/22   Yes Provider, Historical   fludrocortisone (FLORINEF) 0.1 mg tablet Take 0.1 mg by mouth in the morning.  PTA Med List from [facility] 8/8/22   Yes Provider, Historical   sodium chloride (Hyper-SaL) 7 % nebulizer solution 4 mL by Nebulization route two (2) times a day. PTA Med List from [facility] 8/8/22   Yes Provider, Historical   insulin glargine (Lantus U-100 Insulin) 100 unit/mL injection 5 Units by SubCUTAneous route nightly. PTA Med List from [facility] 8/8/22   Yes Provider, Historical   melatonin 3 mg tablet Take 3 mg by mouth nightly as needed for Insomnia. PTA Med List from [facility] 8/8/22   Yes Provider, Historical   mexiletine (MEXITIL) 150 mg capsule Take 150 mg by mouth two (2) times a day. PTA Med List from [facility] 8/8/22   Yes Provider, Historical   midodrine (PROAMATINE) 5 mg tablet Take 10 mg by mouth three (3) times daily. PTA Med List from [facility] 8/8/22   Yes Provider, Historical   cholestyramine-aspartame (QUESTRAN LIGHT) 4 gram packet Take 4 g by mouth two (2) times a day. PTA Med List from [facility] 8/8/22   Yes Provider, Historical   glycopyrrolate (RobinuL) 1 mg tablet Take 1 mg by mouth every eight (8) hours as needed. PTA Med List from [facility] 8/8/22   Yes Provider, Historical   levothyroxine (synthroid) 50 mcg tablet Take 50 mcg by mouth Daily (before breakfast). PTA Med List from [facility] 8/8/22   Yes Provider, Historical   HYDROcodone-acetaminophen (NORCO) 5-325 mg per tablet Take 1 Tablet by mouth every four (4) hours as needed for Pain. PTA Med List from [facility] 8/8/22   Yes Provider, Historical   ascorbic acid, vitamin C, (VITAMIN C) 500 mg tablet Take 500 mg by mouth nightly. PTA Med List from [facility] 8/8/22   Yes Provider, Historical   cholecalciferol (VITAMIN D3) (1000 Units /25 mcg) tablet Take 1,000 Units by mouth in the morning. PTA Med List from [facility] 8/8/22   Yes Provider, Historical   ALPRAZolam (Xanax) 0.25 mg tablet Take 0.25 mg by mouth every eight (8) hours as needed for Anxiety.  PTA Med List from [facility] 8/8/22   Yes Provider, Historical   zinc sulfate (ZINCATE) 50 mg zinc (220 mg) capsule Take 1 Capsule by mouth in the morning. PTA Med List from [facility] 22   Yes Provider, Historical   ondansetron (ZOFRAN) 4 mg/2 mL soln 4 mg by IntraVENous route every four (4) hours as needed for Nausea. PTA Med List from [facility] 22   Yes Provider, Historical   insulin regular (HumuLIN R Regular U-100 Insuln) 100 unit/mL injection by SubCUTAneous route every six (6) hours as needed. PTA Med List from [facility] 22   Yes Provider, Historical   TIGEcycline 50 mg IVPB 50 mg by IntraVENous route every twelve (12) hours. PTA Med List from [facility] 22   Yes Provider, Historical       Allergies/Social/Family History: Allergies   Allergen Reactions    Tape [Adhesive] Rash      Social History     Tobacco Use    Smoking status: Not on file    Smokeless tobacco: Not on file   Substance Use Topics    Alcohol use: Not on file      History reviewed. No pertinent family history. Objective:   Vital Signs:  Visit Vitals  BP (!) 87/62   Pulse (!) 109   Temp 98 °F (36.7 °C)   Resp 17   Ht 5' 8\" (1.727 m)   Wt 61.4 kg (135 lb 5.8 oz)   SpO2 100%   BMI 20.58 kg/m²    O2 Flow Rate (L/min): 10 l/min O2 Device: Tracheostomy, Ventilator Temp (24hrs), Av.8 °F (37.1 °C), Min:98 °F (36.7 °C), Max:99.2 °F (37.3 °C)           Intake/Output:     Intake/Output Summary (Last 24 hours) at 2022 1216  Last data filed at 2022 1100  Gross per 24 hour   Intake 1035.58 ml   Output 2285 ml   Net -1249.42 ml       Physical Exam:                           Head:               Normocephalic,  bitemporal muscle wasting. Eyes:               Conjunctivae/corneas clear. Neck:               Trach  Lungs:             Decreased L  Heart:              RRR  Abdomen:        Not distended.                             PEG, suprapubic urinary cath, colostomy  Extremities:     Muscle wasting --Skin:                  Eschar - heel., Large sacral wound   Neurologic:      Awake and interacting appropriately       LABS AND  DATA: Personally reviewed  Recent Labs     22  4402 08/27/22 0202   WBC 16.4* 13.9*   HGB 7.4* 8.5*   HCT 22.9* 25.9*    367     Recent Labs     08/28/22  0355 08/27/22 0202 08/26/22 0416    136 140   K 3.1* 3.5 2.9*    101 105   CO2 26 27 27   BUN 70* 64* 73*   CREA 2.07* 2.10* 2.17*   * 138* 219*   CA 8.4* 8.3* 8.6   MG 1.9 2.1 2.2   PHOS  --   --  2.8     Recent Labs     08/28/22 0355 08/27/22 0202    130*   TP 6.4 7.0   ALB 1.8* 2.0*   GLOB 4.6* 5.0*     No results for input(s): INR, PTP, APTT, INREXT in the last 72 hours. No results for input(s): PHI, PCO2I, PO2I, FIO2I in the last 72 hours. No results for input(s): CPK, CKMB, TROIQ, BNPP in the last 72 hours. Hemodynamics:   PAP:   CO:     Wedge:   CI:     CVP:    SVR:       PVR:       Ventilator Settings:  Mode Rate Tidal Volume Pressure FiO2 PEEP   Pressure control   0 ml  5 cm H2O 40 % 5 cm H20     Peak airway pressure: 27 cm H2O    Minute ventilation: 5.22 l/min        MEDS: Reviewed    Chest X-Ray:  CXR Results  (Last 48 hours)      None              ECHO:      Left Ventricle: Severely reduced left ventricular systolic function with a visually estimated EF of 15 - 20%. Left ventricle is dilated. Normal wall thickness. Severe global hypokinesis present. Mitral Valve: Mild regurgitation. Left Atrium: Left atrium is dilated. Pulmonary Arteries: Mild pulmonary hypertension present. Pericardium: Left pleural effusion. Technical qualifiers: Echo study was limited due to patient's condition. SPECIAL EQUIPMENT  None    DISPOSITION  Stay in ICU    CRITICAL CARE CONSULTANT NOTE  I had a face to face encounter with the patient, reviewed and interpreted patient data including clinical events, labs, images, vital signs, I/O's, and examined patient.   I have discussed the case and the plan and management of the patient's care with the consulting services, the bedside nurses and the respiratory therapist.      NOTE OF PERSONAL INVOLVEMENT IN CARE   This patient has a high probability of imminent, clinically significant deterioration, which requires the highest level of preparedness to intervene urgently. I participated in the decision-making and personally managed or directed the management of the following life and organ supporting interventions that required my frequent assessment to treat or prevent imminent deterioration. I personally spent 60 minutes of critical care time. This is time spent at this critically ill patient's bedside actively involved in patient care as well as the coordination of care and discussions with the patient's family. This does not include any procedural time which has been billed separately.       [facility]

## 2022-08-29 LAB
ABO + RH BLD: NORMAL
ALBUMIN SERPL-MCNC: 1.9 G/DL (ref 3.5–5)
ALBUMIN/GLOB SERPL: 0.4 {RATIO} (ref 1.1–2.2)
ALP SERPL-CCNC: 132 U/L (ref 45–117)
ALT SERPL-CCNC: 23 U/L (ref 12–78)
ANION GAP SERPL CALC-SCNC: 6 MMOL/L (ref 5–15)
ANTI-COMPLEMENT (C3B,C3D): NORMAL
ANTIGENS PRESENT RBC DONR: NORMAL
AST SERPL-CCNC: 13 U/L (ref 15–37)
BASOPHILS # BLD: 0.1 K/UL (ref 0–0.1)
BASOPHILS NFR BLD: 0 % (ref 0–1)
BILIRUB SERPL-MCNC: 0.3 MG/DL (ref 0.2–1)
BLD PROD TYP BPU: NORMAL
BLOOD BANK CMNT PATIENT-IMP: NORMAL
BLOOD GROUP ANTIBODIES SERPL: NORMAL
BLOOD GROUP ANTIBODIES SERPL: NORMAL
BPU ID: NORMAL
BUN SERPL-MCNC: 72 MG/DL (ref 6–20)
BUN/CREAT SERPL: 34 (ref 12–20)
CALCIUM SERPL-MCNC: 8.3 MG/DL (ref 8.5–10.1)
CHLORIDE SERPL-SCNC: 107 MMOL/L (ref 97–108)
CO2 SERPL-SCNC: 27 MMOL/L (ref 21–32)
COVID-19 RAPID TEST, COVR: NOT DETECTED
CREAT SERPL-MCNC: 2.12 MG/DL (ref 0.7–1.3)
CROSSMATCH RESULT,%XM: NORMAL
DAT IGG-SP REAG RBC QL: NORMAL
DAT POLY-SP REAG RBC QL: NORMAL
DIFFERENTIAL METHOD BLD: ABNORMAL
EOSINOPHIL # BLD: 0.3 K/UL (ref 0–0.4)
EOSINOPHIL NFR BLD: 2 % (ref 0–7)
ERYTHROCYTE [DISTWIDTH] IN BLOOD BY AUTOMATED COUNT: 15.9 % (ref 11.5–14.5)
GLOBULIN SER CALC-MCNC: 5 G/DL (ref 2–4)
GLUCOSE SERPL-MCNC: 255 MG/DL (ref 65–100)
HCT VFR BLD AUTO: 24 % (ref 36.6–50.3)
HGB BLD-MCNC: 7.8 G/DL (ref 12.1–17)
IMM GRANULOCYTES # BLD AUTO: 0.2 K/UL (ref 0–0.04)
IMM GRANULOCYTES NFR BLD AUTO: 1 % (ref 0–0.5)
LYMPHOCYTES # BLD: 0.9 K/UL (ref 0.8–3.5)
LYMPHOCYTES NFR BLD: 5 % (ref 12–49)
MAGNESIUM SERPL-MCNC: 1.9 MG/DL (ref 1.6–2.4)
MCH RBC QN AUTO: 28.7 PG (ref 26–34)
MCHC RBC AUTO-ENTMCNC: 32.5 G/DL (ref 30–36.5)
MCV RBC AUTO: 88.2 FL (ref 80–99)
MONOCYTES # BLD: 0.9 K/UL (ref 0–1)
MONOCYTES NFR BLD: 5 % (ref 5–13)
NEUTS SEG # BLD: 14.7 K/UL (ref 1.8–8)
NEUTS SEG NFR BLD: 87 % (ref 32–75)
NRBC # BLD: 0 K/UL (ref 0–0.01)
NRBC BLD-RTO: 0 PER 100 WBC
PLATELET # BLD AUTO: 414 K/UL (ref 150–400)
PMV BLD AUTO: 10.6 FL (ref 8.9–12.9)
POTASSIUM SERPL-SCNC: 3.9 MMOL/L (ref 3.5–5.1)
PROT SERPL-MCNC: 6.9 G/DL (ref 6.4–8.2)
RBC # BLD AUTO: 2.72 M/UL (ref 4.1–5.7)
SODIUM SERPL-SCNC: 140 MMOL/L (ref 136–145)
SOURCE, COVRS: NORMAL
SPECIMEN EXP DATE BLD: NORMAL
STATUS OF UNIT,%ST: NORMAL
UNIT DIVISION, %UDIV: 0
WBC # BLD AUTO: 17 K/UL (ref 4.1–11.1)

## 2022-08-29 PROCEDURE — 74011250637 HC RX REV CODE- 250/637: Performed by: NURSE PRACTITIONER

## 2022-08-29 PROCEDURE — 83735 ASSAY OF MAGNESIUM: CPT

## 2022-08-29 PROCEDURE — 74011250637 HC RX REV CODE- 250/637: Performed by: INTERNAL MEDICINE

## 2022-08-29 PROCEDURE — 36415 COLL VENOUS BLD VENIPUNCTURE: CPT

## 2022-08-29 PROCEDURE — 74011250636 HC RX REV CODE- 250/636: Performed by: STUDENT IN AN ORGANIZED HEALTH CARE EDUCATION/TRAINING PROGRAM

## 2022-08-29 PROCEDURE — 77030018798 HC PMP KT ENTRL FED COVD -A

## 2022-08-29 PROCEDURE — 87635 SARS-COV-2 COVID-19 AMP PRB: CPT

## 2022-08-29 PROCEDURE — 65620000000 HC RM CCU GENERAL

## 2022-08-29 PROCEDURE — 74011250637 HC RX REV CODE- 250/637: Performed by: STUDENT IN AN ORGANIZED HEALTH CARE EDUCATION/TRAINING PROGRAM

## 2022-08-29 PROCEDURE — 74011636637 HC RX REV CODE- 636/637: Performed by: NURSE PRACTITIONER

## 2022-08-29 PROCEDURE — 74011000250 HC RX REV CODE- 250: Performed by: NURSE PRACTITIONER

## 2022-08-29 PROCEDURE — 85025 COMPLETE CBC W/AUTO DIFF WBC: CPT

## 2022-08-29 PROCEDURE — 80053 COMPREHEN METABOLIC PANEL: CPT

## 2022-08-29 PROCEDURE — 74011250637 HC RX REV CODE- 250/637: Performed by: ANESTHESIOLOGY

## 2022-08-29 PROCEDURE — 94003 VENT MGMT INPAT SUBQ DAY: CPT

## 2022-08-29 RX ORDER — LORAZEPAM 1 MG/1
1 TABLET ORAL ONCE
Status: COMPLETED | OUTPATIENT
Start: 2022-08-29 | End: 2022-08-29

## 2022-08-29 RX ORDER — LORAZEPAM 1 MG/1
1 TABLET ORAL
Status: DISCONTINUED | OUTPATIENT
Start: 2022-08-29 | End: 2022-08-31

## 2022-08-29 RX ORDER — OLANZAPINE 5 MG/1
10 TABLET, ORALLY DISINTEGRATING ORAL
Status: DISCONTINUED | OUTPATIENT
Start: 2022-08-29 | End: 2022-08-31

## 2022-08-29 RX ADMIN — Medication 10 ML: at 06:31

## 2022-08-29 RX ADMIN — OLANZAPINE 10 MG: 5 TABLET, ORALLY DISINTEGRATING ORAL at 20:34

## 2022-08-29 RX ADMIN — ATORVASTATIN CALCIUM 40 MG: 40 TABLET, FILM COATED ORAL at 20:33

## 2022-08-29 RX ADMIN — LORAZEPAM 1 MG: 1 TABLET ORAL at 15:16

## 2022-08-29 RX ADMIN — MINERAL SUPPLEMENT IRON 300 MG / 5 ML STRENGTH LIQUID 100 PER BOX UNFLAVORED 300 MG: at 09:09

## 2022-08-29 RX ADMIN — APIXABAN 5 MG: 5 TABLET, FILM COATED ORAL at 15:15

## 2022-08-29 RX ADMIN — DIPHENHYDRAMINE HYDROCHLORIDE 25 MG: 25 CAPSULE ORAL at 20:35

## 2022-08-29 RX ADMIN — CHLORHEXIDINE GLUCONATE 15 ML: 1.2 RINSE ORAL at 09:37

## 2022-08-29 RX ADMIN — HYDROMORPHONE HYDROCHLORIDE 0.5 MG: 1 INJECTION, SOLUTION INTRAMUSCULAR; INTRAVENOUS; SUBCUTANEOUS at 22:54

## 2022-08-29 RX ADMIN — MEXILETINE HYDROCHLORIDE 150 MG: 150 CAPSULE ORAL at 15:16

## 2022-08-29 RX ADMIN — MINERAL SUPPLEMENT IRON 300 MG / 5 ML STRENGTH LIQUID 100 PER BOX UNFLAVORED 300 MG: at 12:24

## 2022-08-29 RX ADMIN — APIXABAN 5 MG: 5 TABLET, FILM COATED ORAL at 03:04

## 2022-08-29 RX ADMIN — LEVOTHYROXINE SODIUM 50 MCG: 0.05 TABLET ORAL at 06:31

## 2022-08-29 RX ADMIN — COLLAGENASE SANTYL: 250 OINTMENT TOPICAL at 09:37

## 2022-08-29 RX ADMIN — FLUDROCORTISONE ACETATE 0.1 MG: 0.1 TABLET ORAL at 09:09

## 2022-08-29 RX ADMIN — BUMETANIDE 0.5 MG: 1 TABLET ORAL at 09:10

## 2022-08-29 RX ADMIN — Medication 10 ML: at 21:00

## 2022-08-29 RX ADMIN — MIDODRINE HYDROCHLORIDE 20 MG: 5 TABLET ORAL at 21:00

## 2022-08-29 RX ADMIN — MINERAL SUPPLEMENT IRON 300 MG / 5 ML STRENGTH LIQUID 100 PER BOX UNFLAVORED 300 MG: at 19:14

## 2022-08-29 RX ADMIN — CHLORHEXIDINE GLUCONATE 15 ML: 1.2 RINSE ORAL at 21:00

## 2022-08-29 RX ADMIN — LORAZEPAM 1 MG: 1 TABLET ORAL at 01:00

## 2022-08-29 RX ADMIN — MIDODRINE HYDROCHLORIDE 20 MG: 5 TABLET ORAL at 15:15

## 2022-08-29 RX ADMIN — LORAZEPAM 1 MG: 1 TABLET ORAL at 22:54

## 2022-08-29 RX ADMIN — HYDROMORPHONE HYDROCHLORIDE 0.5 MG: 1 INJECTION, SOLUTION INTRAMUSCULAR; INTRAVENOUS; SUBCUTANEOUS at 12:57

## 2022-08-29 RX ADMIN — Medication 10 ML: at 19:15

## 2022-08-29 RX ADMIN — MEXILETINE HYDROCHLORIDE 150 MG: 150 CAPSULE ORAL at 03:04

## 2022-08-29 RX ADMIN — LORAZEPAM 1 MG: 1 TABLET ORAL at 20:35

## 2022-08-29 RX ADMIN — Medication 5 UNITS: at 09:10

## 2022-08-29 RX ADMIN — MIDODRINE HYDROCHLORIDE 20 MG: 5 TABLET ORAL at 06:31

## 2022-08-29 NOTE — PROGRESS NOTES
JADEN: Discharge to [facility] in [address] [place] [address]. Nursing staff call report to [phone] ext [phone]. Transportation ALS via [facility] at 8:30am on 8/30. RUR: 17%     Emergency contact: Chip Goltz, sister, [phone]  Sascha Berger, brother, [phone] or [phone]     Disposition: Patient admitted from [facility] 8/9 for YADY. Patient has a trach and is on a vent. Hx: paraplegia, skin ulcers, afib, chronic sepsis, cardiac arrest, CVA, chronic loya, tracheostomy ventilator, gtube, pulmonary embolism, systolic CHF, HTN, HLD, diabetes type II. Patient off pressors. Still in soft wrist restraints, but these will not prohibit transfer per [facility]. Updated clinicals faxed to [address] [place] [address]. [facility] will reach out to family. CM requested ALS transportation via [facility]. Awaiting on  time. CM notified nursing staff and requested a rapid covid test be completed. CM will continue to follow. 1400  CM has no update regarding transportation time from [facility]. CM contacted [facility] to see if they could assist, but they have no availability today. CM again spoke with [facility] of [address] [place] [address] and provided an update. CM will continue to follow.     1600  CM scheduled transportation with [facility] for tomorrow at 8:30am.    Yulia Patterson, [address] [facility]  [phone]

## 2022-08-29 NOTE — PROGRESS NOTES
SLP Contact Note    Discussed with RN. Patient tolerating diet without difficulty. Tolerating PMV. Vent dependence as of now. Will defer to weaning at [facility]. No further SLP needs. Will sign off. Please reconsult should SLP be of any assistance.       Thank you,  NATALIE VillarealEd, [id number] [facility]  Speech-Language Pathologist

## 2022-08-29 NOTE — PROGRESS NOTES
1900: Bedside report received from [address] [place] [address]    0730:  Bedside report given to [facility]

## 2022-08-29 NOTE — PROGRESS NOTES
0730 Bedside and Verbal shift change report given to [facility] (oncoming nurse) by William Glass (offgoing nurse). Report included the following information SBAR, Kardex, Procedure Summary, Intake/Output, MAR, Accordion, and Recent Results.

## 2022-08-29 NOTE — PROGRESS NOTES
[facility]    ICU TEAM Progress Note    Name: Jose Hagen   : 1980   MRN: [id number]   Date: 2022      Assessment and Plan:     ICU Problems:  Chronic respiratory failure requiring mechanical ventilation  HFrEF  Volume overload  Chronic Renal failure  Chronic decubitus ulcers  Multidrug resistant organism colonization  At risk for sepsis  Hypotension      Briefly,pt is 23-year-old male with known past medical history of multiple chronic issues as listed in problem list, who presented to [facility] ED after being sent from [facility] for possible need for CRRT for YADY due to inability to tolerate iHD due to sepsis/septic shock from unclear source. Patient remained intact improvement weaned off vasopressor support, transition to intermittent HD from CRRT. Patient with renal recovery, no longer requiring HD. Patient with multiple weaning attempts, occasionally tolerating trach collar however either tires out or has secretion issues requiring being placed back on vent. Status post colostomy placement  for assistance in maintaining chronic wounds clean, assist healing. For Chronic respiratory failure requiring mechanical ventilation will continue ventilator support. Clearly volume overloaded with large L sided pleural effusion. Given multidrug resistant organisms, ongoing eliquis, and chronic nature will defer drainage at his point. On PO bumex     For HFrEF will monitor. Poor prognosis. Partial code with NO DEFIBRILLATIONS    For Volume overload bumex PO     For Renal failure will monitor    For Chronic decubitus ulcers will turn as tolerated. Monitor for clinical signs of infection       For Hypotension. Improved with removal of Seroquel. Will trial olanzapine.       ICU Checklist       F - Feeding:  yes   A - Analgesia: Norco PRN  S - Sedation: None  T - DVT Prophylaxis: eliquis   H - Head of Bed: > 30 Degrees  U - Ulcer Prophylaxis: Pepcid (famotidine)   G - Glycemic Control: Insulin  S - Spontaneous Breathing Trial: Yes  B - Bowel Regimen: MiraLax  I - Indwelling Catheter:   Tubes: Tracheostomy  Lines: PICC Line  Drains: None          POD:  4 Days Post-Op    S/P:   Procedure(s):  LAPAROSCOPIC COLOSTOMY    Active Problem List:     Problem List  Never Reviewed            Codes Class    Moderate protein-calorie malnutrition ([facility] ICD-10-CM: E44.0  ICD-9-CM: 263.0         HFrEF (heart failure with reduced ejection fraction) ([facility]) ICD-10-CM: I50.20  ICD-9-CM: 428.20         YADY (acute kidney injury) ([facility] ICD-10-CM: N17.9  ICD-9-CM: 562. 9          Past Medical History:      has no past medical history on file. Past Surgical History:      has no past surgical history on file. Home Medications:     Prior to Admission medications    Medication Sig Start Date End Date Taking? Authorizing Provider   atorvastatin (LIPITOR) 40 mg tablet Take 40 mg by mouth nightly. PTA Med List from [address] [place] [address] 8/8/22   Yes Provider, Historical   albuterol (PROVENTIL VENTOLIN) 2.5 mg /3 mL (0.083 %) nebu 2.5 mg by Nebulization route three (3) times daily as needed for Wheezing. PTA Med List from [address] [place] [address] 8/8/22   Yes Provider, Historical   albuterol (PROVENTIL VENTOLIN) 2.5 mg /3 mL (0.083 %) nebu 2.5 mg by Nebulization route two (2) times a day. PTA Med List from [address] [place] [address] 8/8/22   Yes Provider, Historical   sacubitriL-valsartan Mana Karey) 24-26 mg tablet Take 0.5 Tablets by mouth two (2) times a day. PTA Med List from [address] [place] [address] 8/8/22   Yes Provider, Historical   apixaban (Eliquis) 5 mg tablet Take 5 mg by mouth two (2) times a day. PTA Med List from [address] [place] [address] 8/8/22   Yes Provider, Historical   ferrous sulfate 300 mg (60 mg iron)/5 mL syrup Take 300 mg by mouth in the morning. PTA Med List from [address] [place] [address] 8/8/22   Yes Provider, Historical   fludrocortisone (FLORINEF) 0.1 mg tablet Take 0.1 mg by mouth in the morning.  PTA Med List from [facility] 8/8/22   Yes Provider, Historical   sodium chloride (Hyper-SaL) 7 % nebulizer solution 4 mL by Nebulization route two (2) times a day. PTA Med List from [address] [place] [address] 8/8/22   Yes Provider, Historical   insulin glargine (Lantus U-100 Insulin) 100 unit/mL injection 5 Units by SubCUTAneous route nightly. PTA Med List from [address] [place] [address] 8/8/22   Yes Provider, Historical   melatonin 3 mg tablet Take 3 mg by mouth nightly as needed for Insomnia. PTA Med List from [address] [place] [address] 8/8/22   Yes Provider, Historical   mexiletine (MEXITIL) 150 mg capsule Take 150 mg by mouth two (2) times a day. PTA Med List from [address] [place] [address] 8/8/22   Yes Provider, Historical   midodrine (PROAMATINE) 5 mg tablet Take 10 mg by mouth three (3) times daily. PTA Med List from [address] [place] [address] 8/8/22   Yes Provider, Historical   cholestyramine-aspartame (QUESTRAN LIGHT) 4 gram packet Take 4 g by mouth two (2) times a day. PTA Med List from [address] [place] [address] 8/8/22   Yes Provider, Historical   glycopyrrolate (RobinuL) 1 mg tablet Take 1 mg by mouth every eight (8) hours as needed. PTA Med List from [address] [place] [address] 8/8/22   Yes Provider, Historical   levothyroxine (synthroid) 50 mcg tablet Take 50 mcg by mouth Daily (before breakfast). PTA Med List from [address] [place] [address] 8/8/22   Yes Provider, Historical   HYDROcodone-acetaminophen (NORCO) 5-325 mg per tablet Take 1 Tablet by mouth every four (4) hours as needed for Pain. PTA Med List from [address] [place] [address] 8/8/22   Yes Provider, Historical   ascorbic acid, vitamin C, (VITAMIN C) 500 mg tablet Take 500 mg by mouth nightly. PTA Med List from [address] [place] [address] 8/8/22   Yes Provider, Historical   cholecalciferol (VITAMIN D3) (1000 Units /25 mcg) tablet Take 1,000 Units by mouth in the morning. PTA Med List from [address] [place] [address] 8/8/22   Yes Provider, Historical   ALPRAZolam (Xanax) 0.25 mg tablet Take 0.25 mg by mouth every eight (8) hours as needed for Anxiety. PTA Med List from [address] [place] [address] 8/8/22   Yes Provider, Historical   zinc sulfate (ZINCATE) 50 mg zinc (220 mg) capsule Take 1 Capsule by mouth in the morning.  PTA Med List from [facility] 8/8/22   Yes Provider, Historical   ondansetron (ZOFRAN) 4 mg/2 mL soln 4 mg by IntraVENous route every four (4) hours as needed for Nausea. PTA Med List from [facility]   Yes Provider, Historical   insulin regular (HumuLIN R Regular U-100 Insuln) 100 unit/mL injection by SubCUTAneous route every six (6) hours as needed. PTA Med List from [facility]   Yes Provider, Historical   TIGEcycline 50 mg IVPB 50 mg by IntraVENous route every twelve (12) hours. PTA Med List from [facility]   Yes Provider, Historical       Allergies/Social/Family History: Allergies   Allergen Reactions    Tape [Adhesive] Rash      Social History     Tobacco Use    Smoking status: Not on file    Smokeless tobacco: Not on file   Substance Use Topics    Alcohol use: Not on file      History reviewed. No pertinent family history. Objective:   Vital Signs:  Visit Vitals  /82   Pulse 96   Temp 98 °F (36.7 °C)   Resp 16   Ht 5' 8\" (1.727 m)   Wt 62.4 kg (137 lb 9.1 oz)   SpO2 100%   BMI 20.92 kg/m²    O2 Flow Rate (L/min): 10 l/min O2 Device: Tracheostomy, Ventilator Temp (24hrs), Av.1 °F (36.7 °C), Min:98 °F (36.7 °C), Max:98.2 °F (36.8 °C)           Intake/Output:     Intake/Output Summary (Last 24 hours) at 2022 0910  Last data filed at 2022 0854  Gross per 24 hour   Intake 950 ml   Output 1510 ml   Net -560 ml         Physical Exam:                           Head:               Normocephalic,  bitemporal muscle wasting. Eyes:               Conjunctivae/corneas clear. Neck:               Trach  Lungs:             Decreased L  Heart:              RRR  Abdomen:        Not distended.                             PEG, suprapubic urinary cath, colostomy  Extremities:     Muscle wasting --Skin:                  Eschar - heel., Large sacral wound   Neurologic:      Awake and interacting appropriately       LABS AND  DATA: Personally reviewed  Recent Labs     22  0308 22  0355   WBC 17.0* 16.4*   HGB 7.8* 7.4*   HCT 24.0* 22.9*   * 346       Recent Labs 08/29/22  0308 08/28/22  0355    138   K 3.9 3.1*    104   CO2 27 26   BUN 72* 70*   CREA 2.12* 2.07*   * 163*   CA 8.3* 8.4*   MG 1.9 1.9       Recent Labs     08/29/22  0308 08/28/22  0355   * 117   TP 6.9 6.4   ALB 1.9* 1.8*   GLOB 5.0* 4.6*       No results for input(s): INR, PTP, APTT, INREXT, INREXT in the last 72 hours. No results for input(s): PHI, PCO2I, PO2I, FIO2I in the last 72 hours. No results for input(s): CPK, CKMB, TROIQ, BNPP in the last 72 hours. Hemodynamics:   PAP:   CO:     Wedge:   CI:     CVP:    SVR:       PVR:       Ventilator Settings:  Mode Rate Tidal Volume Pressure FiO2 PEEP   SIMV, Pressure control   0 ml  5 cm H2O 40 % 5 cm H20     Peak airway pressure: 28 cm H2O    Minute ventilation: 7.6 l/min        MEDS: Reviewed    Chest X-Ray:  CXR Results  (Last 48 hours)      None              ECHO:      Left Ventricle: Severely reduced left ventricular systolic function with a visually estimated EF of 15 - 20%. Left ventricle is dilated. Normal wall thickness. Severe global hypokinesis present. Mitral Valve: Mild regurgitation. Left Atrium: Left atrium is dilated. Pulmonary Arteries: Mild pulmonary hypertension present. Pericardium: Left pleural effusion. Technical qualifiers: Echo study was limited due to patient's condition. SPECIAL EQUIPMENT  None    DISPOSITION  Stay in ICU    CRITICAL CARE CONSULTANT NOTE  I had a face to face encounter with the patient, reviewed and interpreted patient data including clinical events, labs, images, vital signs, I/O's, and examined patient. I have discussed the case and the plan and management of the patient's care with the consulting services, the bedside nurses and the respiratory therapist.      NOTE OF PERSONAL INVOLVEMENT IN CARE   This patient has a high probability of imminent, clinically significant deterioration, which requires the highest level of preparedness to intervene urgently.  I participated in the decision-making and personally managed or directed the management of the following life and organ supporting interventions that required my frequent assessment to treat or prevent imminent deterioration. I personally spent 60 minutes of critical care time. This is time spent at this critically ill patient's bedside actively involved in patient care as well as the coordination of care and discussions with the patient's family. This does not include any procedural time which has been billed separately.       632 \[facility] [place] [facility]\""

## 2022-08-29 NOTE — DISCHARGE SUMMARY
Discharge Summary     Patient: Keri Payne       MRN: [id number]       YOB: 1980       Age: 38 y.o. Date of admission:  8/8/2022    Date of discharge:  8/29/2022    Primary care provider:  Yulia Zamudio MD     Admitting provider:  Andrew Brewer MD    Discharging provider(s): Yoselyn Adhikari MD - Staff [address]     Consultations  IP CONSULT TO NEPHROLOGY  IP CONSULT TO GENERAL SURGERY  IP CONSULT TO CARDIOLOGY        Discharge Condition: stable. Chronically ill. Discharge Destination: LTAC. The patient is stable for discharge. Admission diagnosis  YADY (acute kidney injury) ([facility] [N17.9]    Please refer to the admission history and physical for details on the presenting problem. Final discharge diagnoses and brief hospital course  42-year-old male with known past medical history of multiple chronic issues as listed in problem list, who presented to [facility] ED after being sent from [address] [place] [address] for possible need for CRRT for YADY due to inability to tolerate iHD due to sepsis/septic shock from unclear source. Patient remained intact improvement weaned off vasopressor support, transition to intermittent HD from CRRT. Patient with renal recovery, no longer requiring HD. Status post colostomy placement 8/24 for assistance in maintaining chronic wounds clean, assist healing. Completed abx course. Occasional issues with pulling on lines and tracheostomy. Physical examination at discharge  Visit Vitals  /73   Pulse (!) 101   Temp 98.1 °F (36.7 °C)   Resp 16   Ht 5' 8\" (1.727 m)   Wt 62.4 kg (137 lb 9.1 oz)   SpO2 100%   BMI 20.92 kg/m²     Head:               Normocephalic,  bitemporal muscle wasting. Eyes:               Conjunctivae/corneas clear. Neck:               Trach  Lungs:             clear  Heart:              RRR  Abdomen:        Not distended.                             PEG, suprapubic urinary cath, colostomy  Extremities:     Muscle wasting --Skin:                  Eschar - heel., Large sacral wound   Neurologic:      Awake and interacting appropriately        Recent Labs     08/29/22 0308 08/28/22 0355 08/27/22  0202   WBC 17.0* 16.4* 13.9*   HGB 7.8* 7.4* 8.5*   HCT 24.0* 22.9* 25.9*   * 346 367     Recent Labs     08/29/22 0308 08/28/22 0355 08/27/22  0202    138 136   K 3.9 3.1* 3.5    104 101   CO2 27 26 27   BUN 72* 70* 64*   CREA 2.12* 2.07* 2.10*   * 163* 138*   CA 8.3* 8.4* 8.3*   MG 1.9 1.9 2.1     Recent Labs     08/29/22 0308 08/28/22 0355 08/27/22 0202   * 117 130*   TP 6.9 6.4 7.0   ALB 1.9* 1.8* 2.0*   GLOB 5.0* 4.6* 5.0*     No results for input(s): INR, PTP, APTT, INREXT in the last 72 hours. No results for input(s): FE, TIBC, PSAT, FERR in the last 72 hours. No results for input(s): PH, PCO2, PO2 in the last 72 hours. No results for input(s): CPK, CKMB in the last 72 hours. No lab exists for component: TROPONINI  No components found for: [place]    Pertinent imaging studies:  INDICATION:    post new trach      EXAMINATION:  AP CHEST, PORTABLE     COMPARISON: August 22     FINDINGS: Single AP portable view of the chest at 1043 hours demonstrates a  tracheostomy tube in satisfactory position. Left pleural effusion is increased  in size, and is now moderate to large. There is worsening aeration at the right  lung base. There is no pneumothorax. The cardiomediastinal silhouette is stable. IMPRESSION  Tracheostomy tube in satisfactory position. Significant increase in size of the  left pleural effusion, now large. Worsening aeration of the left lung.             Chronic Diagnoses:    Problem List as of 8/29/2022 Never Reviewed            Codes Class Noted - Resolved    Moderate protein-calorie malnutrition ([facility] ICD-10-CM: E44.0  ICD-9-CM: 263.0  8/19/2022 - Present        HFrEF (heart failure with reduced ejection fraction) ([facility] ICD-10-CM: I50.20  ICD-9-CM: 428.20  8/16/2022 - Present        YADY (acute kidney injury) [facility]) ICD-10-CM: N17.9  ICD-9-CM: 584.9  8/9/2022 - Present           Time spent on discharge related activities today greater than 30 minutes.       Signed:      Triston Lindsay MD   Staff [facility]    8/29/2022   3:51 PM

## 2022-08-30 ENCOUNTER — APPOINTMENT (OUTPATIENT)
Dept: GENERAL RADIOLOGY | Age: 42
DRG: 853 | End: 2022-08-30
Attending: ANESTHESIOLOGY
Payer: MEDICARE

## 2022-08-30 ENCOUNTER — APPOINTMENT (OUTPATIENT)
Dept: CT IMAGING | Age: 42
DRG: 853 | End: 2022-08-30
Attending: ANESTHESIOLOGY
Payer: MEDICARE

## 2022-08-30 LAB
ALBUMIN SERPL-MCNC: 2 G/DL (ref 3.5–5)
ANION GAP SERPL CALC-SCNC: 7 MMOL/L (ref 5–15)
BASOPHILS # BLD: 0.1 K/UL (ref 0–0.1)
BASOPHILS NFR BLD: 0 % (ref 0–1)
BUN SERPL-MCNC: 70 MG/DL (ref 6–20)
BUN/CREAT SERPL: 37 (ref 12–20)
CALCIUM SERPL-MCNC: 8.4 MG/DL (ref 8.5–10.1)
CHLORIDE SERPL-SCNC: 107 MMOL/L (ref 97–108)
CO2 SERPL-SCNC: 24 MMOL/L (ref 21–32)
CREAT SERPL-MCNC: 1.88 MG/DL (ref 0.7–1.3)
DIFFERENTIAL METHOD BLD: ABNORMAL
EOSINOPHIL # BLD: 0.2 K/UL (ref 0–0.4)
EOSINOPHIL NFR BLD: 1 % (ref 0–7)
ERYTHROCYTE [DISTWIDTH] IN BLOOD BY AUTOMATED COUNT: 16.3 % (ref 11.5–14.5)
GLUCOSE SERPL-MCNC: 251 MG/DL (ref 65–100)
HCT VFR BLD AUTO: 25.4 % (ref 36.6–50.3)
HGB BLD-MCNC: 7.8 G/DL (ref 12.1–17)
IMM GRANULOCYTES # BLD AUTO: 0.2 K/UL (ref 0–0.04)
IMM GRANULOCYTES NFR BLD AUTO: 1 % (ref 0–0.5)
LYMPHOCYTES # BLD: 1.2 K/UL (ref 0.8–3.5)
LYMPHOCYTES NFR BLD: 5 % (ref 12–49)
MAGNESIUM SERPL-MCNC: 1.8 MG/DL (ref 1.6–2.4)
MCH RBC QN AUTO: 28.3 PG (ref 26–34)
MCHC RBC AUTO-ENTMCNC: 30.7 G/DL (ref 30–36.5)
MCV RBC AUTO: 92 FL (ref 80–99)
MONOCYTES # BLD: 1 K/UL (ref 0–1)
MONOCYTES NFR BLD: 5 % (ref 5–13)
NEUTS SEG # BLD: 18.5 K/UL (ref 1.8–8)
NEUTS SEG NFR BLD: 88 % (ref 32–75)
NRBC # BLD: 0 K/UL (ref 0–0.01)
NRBC BLD-RTO: 0 PER 100 WBC
PHOSPHATE SERPL-MCNC: 4.2 MG/DL (ref 2.6–4.7)
PLATELET # BLD AUTO: 497 K/UL (ref 150–400)
PMV BLD AUTO: 10.2 FL (ref 8.9–12.9)
POTASSIUM SERPL-SCNC: 4.3 MMOL/L (ref 3.5–5.1)
RBC # BLD AUTO: 2.76 M/UL (ref 4.1–5.7)
SODIUM SERPL-SCNC: 138 MMOL/L (ref 136–145)
WBC # BLD AUTO: 21.2 K/UL (ref 4.1–11.1)

## 2022-08-30 PROCEDURE — 74011250637 HC RX REV CODE- 250/637: Performed by: STUDENT IN AN ORGANIZED HEALTH CARE EDUCATION/TRAINING PROGRAM

## 2022-08-30 PROCEDURE — 85025 COMPLETE CBC W/AUTO DIFF WBC: CPT

## 2022-08-30 PROCEDURE — 74011250637 HC RX REV CODE- 250/637: Performed by: NURSE PRACTITIONER

## 2022-08-30 PROCEDURE — 74011250636 HC RX REV CODE- 250/636: Performed by: NURSE PRACTITIONER

## 2022-08-30 PROCEDURE — 74011250637 HC RX REV CODE- 250/637: Performed by: INTERNAL MEDICINE

## 2022-08-30 PROCEDURE — 65620000000 HC RM CCU GENERAL

## 2022-08-30 PROCEDURE — 74011250636 HC RX REV CODE- 250/636: Performed by: STUDENT IN AN ORGANIZED HEALTH CARE EDUCATION/TRAINING PROGRAM

## 2022-08-30 PROCEDURE — 74018 RADEX ABDOMEN 1 VIEW: CPT

## 2022-08-30 PROCEDURE — 77030018717 HC DRSG GRNUFM KCON -B

## 2022-08-30 PROCEDURE — 83735 ASSAY OF MAGNESIUM: CPT

## 2022-08-30 PROCEDURE — 80069 RENAL FUNCTION PANEL: CPT

## 2022-08-30 PROCEDURE — 2709999900 HC NON-CHARGEABLE SUPPLY

## 2022-08-30 PROCEDURE — 74011250637 HC RX REV CODE- 250/637: Performed by: ANESTHESIOLOGY

## 2022-08-30 PROCEDURE — 94003 VENT MGMT INPAT SUBQ DAY: CPT

## 2022-08-30 PROCEDURE — 36415 COLL VENOUS BLD VENIPUNCTURE: CPT

## 2022-08-30 PROCEDURE — 74011636637 HC RX REV CODE- 636/637: Performed by: NURSE PRACTITIONER

## 2022-08-30 PROCEDURE — 77030040162

## 2022-08-30 PROCEDURE — 97606 NEG PRS WND THER DME>50 SQCM: CPT

## 2022-08-30 PROCEDURE — 74011000250 HC RX REV CODE- 250: Performed by: NURSE PRACTITIONER

## 2022-08-30 RX ADMIN — Medication 10 ML: at 06:00

## 2022-08-30 RX ADMIN — MINERAL SUPPLEMENT IRON 300 MG / 5 ML STRENGTH LIQUID 100 PER BOX UNFLAVORED 300 MG: at 08:39

## 2022-08-30 RX ADMIN — Medication 10 ML: at 16:04

## 2022-08-30 RX ADMIN — FLUDROCORTISONE ACETATE 0.1 MG: 0.1 TABLET ORAL at 08:39

## 2022-08-30 RX ADMIN — MINERAL SUPPLEMENT IRON 300 MG / 5 ML STRENGTH LIQUID 100 PER BOX UNFLAVORED 300 MG: at 15:52

## 2022-08-30 RX ADMIN — MEXILETINE HYDROCHLORIDE 150 MG: 150 CAPSULE ORAL at 15:52

## 2022-08-30 RX ADMIN — Medication 5 UNITS: at 08:39

## 2022-08-30 RX ADMIN — CHLORHEXIDINE GLUCONATE 15 ML: 1.2 RINSE ORAL at 21:39

## 2022-08-30 RX ADMIN — APIXABAN 5 MG: 5 TABLET, FILM COATED ORAL at 06:07

## 2022-08-30 RX ADMIN — MEXILETINE HYDROCHLORIDE 150 MG: 150 CAPSULE ORAL at 06:07

## 2022-08-30 RX ADMIN — HYDROMORPHONE HYDROCHLORIDE 0.5 MG: 1 INJECTION, SOLUTION INTRAMUSCULAR; INTRAVENOUS; SUBCUTANEOUS at 17:24

## 2022-08-30 RX ADMIN — OLANZAPINE 10 MG: 5 TABLET, ORALLY DISINTEGRATING ORAL at 21:39

## 2022-08-30 RX ADMIN — COLLAGENASE SANTYL: 250 OINTMENT TOPICAL at 15:53

## 2022-08-30 RX ADMIN — APIXABAN 5 MG: 5 TABLET, FILM COATED ORAL at 15:52

## 2022-08-30 RX ADMIN — LORAZEPAM 1 MG: 1 TABLET ORAL at 15:52

## 2022-08-30 RX ADMIN — BUMETANIDE 0.5 MG: 1 TABLET ORAL at 08:39

## 2022-08-30 RX ADMIN — MIDODRINE HYDROCHLORIDE 20 MG: 5 TABLET ORAL at 06:07

## 2022-08-30 RX ADMIN — HYDROMORPHONE HYDROCHLORIDE 0.5 MG: 1 INJECTION, SOLUTION INTRAMUSCULAR; INTRAVENOUS; SUBCUTANEOUS at 09:34

## 2022-08-30 RX ADMIN — LORAZEPAM 1 MG: 1 TABLET ORAL at 21:39

## 2022-08-30 RX ADMIN — ATORVASTATIN CALCIUM 40 MG: 40 TABLET, FILM COATED ORAL at 21:39

## 2022-08-30 RX ADMIN — LEVOTHYROXINE SODIUM 50 MCG: 0.05 TABLET ORAL at 06:07

## 2022-08-30 RX ADMIN — CHLORHEXIDINE GLUCONATE 15 ML: 1.2 RINSE ORAL at 15:54

## 2022-08-30 RX ADMIN — MIDODRINE HYDROCHLORIDE 20 MG: 5 TABLET ORAL at 21:39

## 2022-08-30 RX ADMIN — HYDROCODONE BITARTRATE AND ACETAMINOPHEN 1 TABLET: 10; 325 TABLET ORAL at 06:07

## 2022-08-30 RX ADMIN — ONDANSETRON HYDROCHLORIDE 4 MG: 2 INJECTION, SOLUTION INTRAMUSCULAR; INTRAVENOUS at 08:57

## 2022-08-30 RX ADMIN — MIDODRINE HYDROCHLORIDE 20 MG: 5 TABLET ORAL at 15:52

## 2022-08-30 NOTE — PROGRESS NOTES
0730 Bedside and Verbal shift change report given to Marco Antonio Hall (oncoming nurse) by Alyssa Guerrero (offgoing nurse). Report included the following information SBAR, Kardex, Procedure Summary, Intake/Output, MAR, Accordion, and Recent Results. 0800 report given to ICU for patient going to [place]. 0900 PT had episode of vomiting with distended and tender ABD. Dr. Augusta Mchugh notified orders for KUB. Case management notified, will hold off on transfer at this time. 1020 KUB obtained, PEG tube hooked to suction for decompression. 900mL out.

## 2022-08-30 NOTE — PROGRESS NOTES
Problem: Falls - Risk of  Goal: *Absence of Falls  Description: Document Ashlie Bello Fall Risk and appropriate interventions in the flowsheet. Outcome: Progressing Towards Goal  Note: Fall Risk Interventions:  Mobility Interventions: Communicate number of staff needed for ambulation/transfer    Mentation Interventions: Adequate sleep, hydration, pain control, More frequent rounding    Medication Interventions: Patient to call before getting OOB    Elimination Interventions: Call light in reach              Problem: Patient Education: Go to Patient Education Activity  Goal: Patient/Family Education  Outcome: Progressing Towards Goal     Problem: Pressure Injury - Risk of  Goal: *Prevention of pressure injury  Description: Document Kavon Scale and appropriate interventions in the flowsheet. Outcome: Progressing Towards Goal  Note: Pressure Injury Interventions:  Sensory Interventions: Assess changes in LOC, Keep linens dry and wrinkle-free, Minimize linen layers, Turn and reposition approx. every two hours (pillows and wedges if needed)    Moisture Interventions: Minimize layers, Internal/External urinary devices    Activity Interventions: Pressure redistribution bed/mattress(bed type)    Mobility Interventions: HOB 30 degrees or less, Pressure redistribution bed/mattress (bed type), Turn and reposition approx.  every two hours(pillow and wedges)    Nutrition Interventions: Document food/fluid/supplement intake, Discuss nutritional consult with provider    Friction and Shear Interventions: HOB 30 degrees or less, Minimize layers                Problem: Patient Education: Go to Patient Education Activity  Goal: Patient/Family Education  Outcome: Progressing Towards Goal     Problem: Infection - Risk of, Central Venous Catheter-Associated Bloodstream Infection  Goal: *Absence of infection signs and symptoms  Outcome: Progressing Towards Goal     Problem: Patient Education: Go to Patient Education Activity  Goal: Patient/Family Education  Outcome: Progressing Towards Goal     Problem: Infection - Risk of, Urinary Catheter-Associated Urinary Tract Infection  Goal: *Absence of infection signs and symptoms  Outcome: Progressing Towards Goal     Problem: Patient Education: Go to Patient Education Activity  Goal: Patient/Family Education  Outcome: Progressing Towards Goal     Problem: Infection - Risk of, Ventilator-Associated Pneumonia  Goal: *Absence of infection signs and symptoms  Outcome: Progressing Towards Goal     Problem: Patient Education: Go to Patient Education Activity  Goal: Patient/Family Education  Outcome: Progressing Towards Goal     Problem: Ventilator Management  Goal: *Adequate oxygenation and ventilation  Outcome: Progressing Towards Goal  Goal: *Patient maintains clear airway/free of aspiration  Outcome: Progressing Towards Goal  Goal: *Absence of infection signs and symptoms  Outcome: Progressing Towards Goal  Goal: *Normal spontaneous ventilation  Outcome: Progressing Towards Goal     Problem: Patient Education: Go to Patient Education Activity  Goal: Patient/Family Education  Outcome: Progressing Towards Goal     Problem: Patient Education: Go to Patient Education Activity  Goal: Patient/Family Education  Outcome: Progressing Towards Goal     Problem: Heart Failure: Day 3  Goal: Off Pathway (Use only if patient is Off Pathway)  Outcome: Progressing Towards Goal  Goal: Activity/Safety  Outcome: Progressing Towards Goal  Goal: Discharge Planning  Outcome: Progressing Towards Goal  Goal: Medications  Outcome: Progressing Towards Goal  Goal: Respiratory  Outcome: Progressing Towards Goal  Goal: Treatments/Interventions/Procedures  Outcome: Progressing Towards Goal  Goal: Psychosocial  Outcome: Progressing Towards Goal  Goal: *Oxygen saturation within defined limits  Outcome: Progressing Towards Goal  Goal: *Hemodynamically stable  Outcome: Progressing Towards Goal  Goal: *Optimal pain control at patient's stated goal  Outcome: Progressing Towards Goal  Goal: *Anxiety reduced or absent  Outcome: Progressing Towards Goal  Goal: *Demonstrates progressive activity  Outcome: Progressing Towards Goal     Problem: Heart Failure: Day 4  Goal: Off Pathway (Use only if patient is Off Pathway)  Outcome: Progressing Towards Goal  Goal: Activity/Safety  Outcome: Progressing Towards Goal  Goal: Diagnostic Test/Procedures  Outcome: Progressing Towards Goal  Goal: Nutrition/Diet  Outcome: Progressing Towards Goal  Goal: Discharge Planning  Outcome: Progressing Towards Goal  Goal: Medications  Outcome: Progressing Towards Goal  Goal: Respiratory  Outcome: Progressing Towards Goal  Goal: Treatments/Interventions/Procedures  Outcome: Progressing Towards Goal  Goal: Psychosocial  Outcome: Progressing Towards Goal  Goal: *Oxygen saturation within defined limits  Outcome: Progressing Towards Goal  Goal: *Hemodynamically stable  Outcome: Progressing Towards Goal  Goal: *Optimal pain control at patient's stated goal  Outcome: Progressing Towards Goal  Goal: *Anxiety reduced or absent  Outcome: Progressing Towards Goal  Goal: *Demonstrates progressive activity  Outcome: Progressing Towards Goal     Problem: Heart Failure: Day 5  Goal: Off Pathway (Use only if patient is Off Pathway)  Outcome: Progressing Towards Goal  Goal: Activity/Safety  Outcome: Progressing Towards Goal  Goal: Diagnostic Test/Procedures  Outcome: Progressing Towards Goal  Goal: Nutrition/Diet  Outcome: Progressing Towards Goal  Goal: Discharge Planning  Outcome: Progressing Towards Goal  Goal: Medications  Outcome: Progressing Towards Goal  Goal: Respiratory  Outcome: Progressing Towards Goal  Goal: Treatments/Interventions/Procedures  Outcome: Progressing Towards Goal  Goal: Psychosocial  Outcome: Progressing Towards Goal     Problem: Heart Failure: Discharge Outcomes  Goal: *Demonstrates ability to perform prescribed activity without shortness of breath or discomfort  Outcome: Progressing Towards Goal  Goal: *Left ventricular function assessment completed prior to or during stay, or planned for post-discharge  Outcome: Progressing Towards Goal  Goal: *ACEI prescribed if LVEF less than 40% and no contraindications or ARB prescribed  Outcome: Progressing Towards Goal  Goal: *Verbalizes understanding and describes prescribed diet  Outcome: Progressing Towards Goal  Goal: *Verbalizes understanding/describes prescribed medications  Outcome: Progressing Towards Goal  Goal: *Describes available resources and support systems  Description: (eg: Home Health, Palliative Care, Advanced Medical Directive)  Outcome: Progressing Towards Goal  Goal: *Describes smoking cessation resources  Outcome: Progressing Towards Goal  Goal: *Understands and describes signs and symptoms to report to providers(Stroke Metric)  Outcome: Progressing Towards Goal  Goal: *Describes/verbalizes understanding of follow-up/return appt  Description: (eg: to physicians, diabetes treatment coordinator, and other resources  Outcome: Progressing Towards Goal  Goal: *Describes importance of continuing daily weights and changes to report to physician  Outcome: Progressing Towards Goal

## 2022-08-30 NOTE — PROGRESS NOTES
JADEN: Anticipate discharge to [facility] in [address] [place] [address] pending medical progress. Nursing staff call report to [phone] ext [phone]. Transportation ALS. RUR: 17%     Emergency contact: Kelly Bal, sister, [phone]  Bel Horner, brother, [phone] or [phone]     Disposition: Patient admitted from [facility] 8/9 for YADY. Patient has a trach and is on a vent. Hx: paraplegia, skin ulcers, afib, chronic sepsis, cardiac arrest, CVA, chronic loya, tracheostomy ventilator, gtube, pulmonary embolism, systolic CHF, HTN, HLD, diabetes type II. CM received a call from nursing staff stating patient's abdomen is distended and he is having emesis. Transfer to facility cancelled for today. CM notified transportation and facility. Will continue to follow for discharge needs. 1100  CM received a return call from [place] with Luanne Tierney. She stated she was able to get patient's insurance authorization extended. CM will continue to follow for discharge plan.     Leopoldo Kim, [address] [facility],[address]  [phone]

## 2022-08-30 NOTE — PROGRESS NOTES
Name: Jabari Blanco MRN: [id number]   : 1980 Hospital: [address]   Date:  22       IMPRESSION:   YADY, started on iHD initially. Currently off HD. Scr is stable. Patient is non oliguric. Hypotension resolved, off iv pressor, on midodrine  Hypophosphatemia- resolved  Debility with multiple pressure ulcers  Respiratory failure - on vent, weaning  Anemia of chronic disease-   Hyperkalemia - resolved with RRT  Protein deficiency  Wounds  Hypokalemia - corrected. K - 3.5  S/p colostomy  to keep the sacral wounds clean      PLAN:   S/p HD , no need for HD, Creatinine is stable , good urine output  S/p Bumex  Started po bumex  Replete potassium  Follow lytes. Midodrine 20 mg tid   Epo increased to 14K/week, po Iron  Tube feeding nepro  Dose meds for his GFR. Avoid NSAIDs + IV contrast.  Abx per ICU team     Subjective/Interval History:   I have reviewed the flowsheet and previous days notes. Seen on CRRT, awake and alert, denies pain/SOB with nodding head      F/U - YADY , CRRT --> 2022    Remains on CRRT + pressors. No new complaints were offered.  seen on CRRT, d/w ICU RN   CRRT stopped, BP stable on minimal Levophed, getting NeutraPhos  8/15 awake and alert, on vent. BP stable, had 175 ml UO yesterday, 150 ml today sofar. Cr 1.1. will add bumex if BP toleerates, no HD today  , awake on vent. UO has increased had 1 lit yesterday with bumex, cr increasing. BP stable, will hold HD today again   sleeping on vent, arousable, BP stable in 120s, had 1100 ml u/o and 500 overnight. Cr higher  , awake, off vent on trach collar, pulled his manjula this am, UO up       - F/U - YADY, Hx of HD --> 22    No new complaints. 22 - F/U - Yady , Hx of HD --> 22    No complaints. 22 - F/U YADY , Hx of HD    Unable to obtain the ROS. 22 --> F/U YADY, Hx of HD  No new complaints.      awake and alert, on trach collar, good uo, Cr stable   Pt seen and examined , back on vent, cr improving, BUN higher    22 --> F/U YADY, Hx of HD    On vent support    , awake, on vent, had colostomy, good urine output, cr stable    22 Seems to be comfortable. Resting in bed, asleep. No acute events are reported.  late note entry: seen and examined, renal function stable    Objective:   Vital Signs:    Visit Vitals  /83   Pulse (!) 117   Temp 99.9 °F (37.7 °C)   Resp 21   Ht 5' 8\" (1.727 m)   Wt 66.3 kg (146 lb 2.6 oz)   SpO2 100%   BMI 22.22 kg/m²       O2 Device: Tracheostomy, Ventilator   O2 Flow Rate (L/min): 10 l/min   Temp (24hrs), Av.3 °F (36.8 °C), Min:97.8 °F (36.6 °C), Max:99.9 °F (37.7 °C)       Intake/Output:   Last shift:       07 -  190  In: -   Out: 200 [Urine:200]  Last 3 shifts:  1901 -  0700  In: 1269 [P.O.:200]  Out: 2660 [Urine:2160]    Intake/Output Summary (Last 24 hours) at 2022 1110  Last data filed at 2022 0942  Gross per 24 hour   Intake 1570 ml   Output 1600 ml   Net -30 ml          Physical Exam:      Seen in CCU - Bed 25. General:     Awake and alert on vent support                         Head:   Normocephalic,  bitemporal muscle wasting. Eyes:   Conjunctivae/corneas clear. Neck:  Trach    Lungs:   no wheezes, no rales, no rhonchi. Heart:   Reg, No S 3 gallop, no pericardial rub  . Abdomen:   Not distended.                             PEG, suprapubic urinary cath, colostomy    Extremities: Muscle wasting --> 2 + upper thigh                          Leg oedema -> none     Skin:     Eschar - heel., Large sacral wound     Psych:  Calm    Neurologic: Awake and interacting appropriately    DATA:  Labs:  Recent Labs     22  0055 22  0308 22  0355    140 138   K 4.3 3.9 3.1*    107 104   CO2 24 27 26   BUN 70* 72* 70*   CREA 1.88* 2.12* 2.07*   CA 8.4* 8.3* 8.4*   ALB 2.0* 1.9* 1.8*   PHOS 4.2  --   --    MG 1.8 1.9 1.9       Recent Labs     08/30/22  0055 08/29/22  0308 08/28/22  0355   WBC 21.2* 17.0* 16.4*   HGB 7.8* 7.8* 7.4*   HCT 25.4* 24.0* 22.9*   * 414* 346       No results for input(s): YAMIL, KU, CLU, CREAU in the last 72 hours.     No lab exists for component: PROU    Total time spent with patient:           Care Plan discussed with:        Delfina Malone MD

## 2022-08-30 NOTE — WOUND CARE
WOCN Note:     Follow-up visit for multiple wounds. Seen with Gael Medrano NP, for necrotic stoma. Chart shows:  Admitted on 8/9/22. Assessment:   Appropriately conversational and reports no pain. Padmini Pink RN, at bedside to assist.   Requires full assists in repositioning. Colostomy. Suprapubic catheter. Surface: air fluidized mattress    1. POA sacral stage 4 pressure injury   90% granular red 20% scattered yellow with exposed bone  Undermining circumferentially with a 3.5 cm tunnel @ 3 o'clock  2 black foam & 1 white foam used     2. POA left hip stage 4 pressure injury   75% granular red 25% soft yellow with Sorbact applied   Scant green drainage on gauze. 1 black foam used     3. POA left ischial stage 4 pressure injury   Granular red with scant amounts of devitalized yellow  1 black foam used     4. POA right hip stage 4 pressure injury   Pink/red with deep track/undermining on buttock side  3 pieces of black foam used     5. POA right ischial stage 4 pressure injury   Granular red  1 pieces of black foam     After cleaning with saline, skin prep applied to alycia-wounds, and all wounds were bridged to right hip; total of 8 black granufoams, 1 white versafoam, 1 Cutimed Sorbact (green), and 2 pieces of ostomy strip paste. 125 mmHg suction achieved. Able to apply wound VAC using 2 large wound VAC dressing kits. 6.  POA right lateral lower leg proximal wound   50% soft tan/yellow & 50% granular red  Santyl applied to proximal yellow and covered with moist gauze; distal portion covered with Tegaderm for autolytic debridement. 7.  POA right heel unstageable pressure injury   Unchanged - dry, black eschar. Betadine applied daily. 8.  POA right lateral ankle   superficial crusting; Left open to air. 9.  POA right medial knee   1.8 x 1.8 x 0.5cm  90% red 10% central yellow  Hydrocolloid applied.       10. POA left heel unstageable pressure injury   Unchanged - stable, black eschar. Betadine applied daily. Colostomy: soft, gray necrosis with glimpse of red on inner rim. Still producing stool. Applied 2-piece flat pouch. Wound Recommendations:    Maintain VAC as ordered @ 125 mmHg suction with twice weekly dressing change. Continue Betadine to bilateral heels daily. Continue wound care as ordered to RLL & knee. PI Prevention:  Turn/reposition approximately every 2 hours  Offload heels with heels hanging off end of pillow at all times while in bed. Air fluidized mattress: Use only flat sheet and one incontinence pad. Please call [employer] @ [phone] for bed to be picked up at discharge. Discussed with Brant Olivera RN, Kristen Batista NP.     Transition of Care: Plan to follow as needed while admitted to hospital.     RASHIDA MujicaN, RN, [facility]  Certified Wound, Ostomy, Continence Nurse  office [phone]  Available via [facility]

## 2022-08-30 NOTE — PROGRESS NOTES
Name: Caren Yanes MRN: [id number]   : 1980 Hospital: [address]   Date:  22       IMPRESSION:   YADY, started on iHD initially. Currently off HD. Scr is stable. Patient is non oliguric. Hypotension resolved, off iv pressor, on midodrine  Hypophosphatemia- resolved  Debility with multiple pressure ulcers  Respiratory failure - on vent, weaning  Anemia of chronic disease-   Hyperkalemia - resolved with RRT  Protein deficiency  Wounds  Hypokalemia - corrected. K - 3.5  S/p colostomy  to keep the sacral wounds clean      PLAN:   S/p HD , no need for HD, Creatinine is stable , good urine output  S/p Iv Bumex  C/w po bumex  Replete potassium prn  Follow lytes. Midodrine 20 mg tid   Epo increased to 14K/week, po Iron  Tube feeding nepro  Dose meds for his GFR. Avoid NSAIDs + IV contrast.  Abx per ICU team  Plan to DC to LTAC     Subjective/Interval History:   I have reviewed the flowsheet and previous days notes. Seen on CRRT, awake and alert, denies pain/SOB with nodding head      F/U - YADY , CRRT --> 2022    Remains on CRRT + pressors. No new complaints were offered.  seen on CRRT, d/w ICU RN   CRRT stopped, BP stable on minimal Levophed, getting NeutraPhos  8/15 awake and alert, on vent. BP stable, had 175 ml UO yesterday, 150 ml today sofar. Cr 1.1. will add bumex if BP toleerates, no HD today  , awake on vent. UO has increased had 1 lit yesterday with bumex, cr increasing. BP stable, will hold HD today again   sleeping on vent, arousable, BP stable in 120s, had 1100 ml u/o and 500 overnight. Cr higher  , awake, off vent on trach collar, pulled his manjula this am, UO up       - F/U - YADY, Hx of HD --> 22    No new complaints. 22 - F/U - Yady , Hx of HD --> 22    No complaints. 22 - F/U YADY , Hx of HD    Unable to obtain the ROS. 22 --> F/U YADY, Hx of HD  No new complaints.      awake and alert, on trach collar, good uo, Cr stable   Pt seen and examined , back on vent, cr improving, BUN higher    22 --> F/U YADY, Hx of HD    On vent support    , awake, on vent, had colostomy, good urine output, cr stable    22 Seems to be comfortable. Resting in bed, asleep. No acute events are reported.  late note entry: seen and examined, renal function stable   seen and examined, Discharge on hold due to N/V. Renal function stable, diuresing adequately-RN at bedside    Objective:   Vital Signs:    Visit Vitals  /83   Pulse (!) 117   Temp 99.9 °F (37.7 °C)   Resp 21   Ht 5' 8\" (1.727 m)   Wt 66.3 kg (146 lb 2.6 oz)   SpO2 100%   BMI 22.22 kg/m²       O2 Device: Tracheostomy, Ventilator   O2 Flow Rate (L/min): 10 l/min   Temp (24hrs), Av.3 °F (36.8 °C), Min:97.8 °F (36.6 °C), Max:99.9 °F (37.7 °C)       Intake/Output:   Last shift:       0701 -  1900  In: -   Out: 200 [Urine:200]  Last 3 shifts: 1901 -  0700  In: 0131 [P.O.:200]  Out: 1001 [Urine:2160]    Intake/Output Summary (Last 24 hours) at 2022 1113  Last data filed at 2022 0942  Gross per 24 hour   Intake 1570 ml   Output 1600 ml   Net -30 ml          Physical Exam:      Seen in CCU - Bed . General:     Awake and alert on vent support                         Head:   Normocephalic,  bitemporal muscle wasting. Eyes:   Conjunctivae/corneas clear. Neck:  Trach    Lungs:   no wheezes, no rales, no rhonchi. Heart:   Reg, No S 3 gallop, no pericardial rub  . Abdomen:   Not distended.                             PEG, suprapubic urinary cath, colostomy    Extremities: Muscle wasting --> trace edema     Skin:     Eschar - heel., Large sacral wound     Psych:  Calm    Neurologic: Awake, less interactive today   DATA:  Labs:  Recent Labs     22  0055 22  0308 22  0355    140 138   K 4.3 3.9 3.1*    107 104   CO2 24 27 26   BUN 70* 72* 70*   CREA 1.88* 2.12* 2.07*   CA 8.4* 8. 3* 8.4*   ALB 2.0* 1.9* 1.8*   PHOS 4.2  --   --    MG 1.8 1.9 1.9       Recent Labs     08/30/22  0055 08/29/22  0308 08/28/22  0355   WBC 21.2* 17.0* 16.4*   HGB 7.8* 7.8* 7.4*   HCT 25.4* 24.0* 22.9*   * 414* 346       No results for input(s): YAMIL, KU, CLU, CREAU in the last 72 hours.     No lab exists for component: PROU    Total time spent with patient:           Care Plan discussed with:        Jackie Mcdowell MD

## 2022-08-30 NOTE — PROGRESS NOTES
[facility]    ICU TEAM Progress Note    Name: Wilton Romero   : 1980   MRN: [id number]   Date: 2022      Assessment and Plan:     ICU Problems:  Chronic respiratory failure requiring mechanical ventilation  HFrEF  Volume overload  Chronic Renal failure  Chronic decubitus ulcers  Multidrug resistant organism colonization  At risk for sepsis  Gastric outlet obstruction        Briefly,pt is 42-year-old male with known past medical history of multiple chronic issues as listed in problem list, who presented to [facility] ED after being sent from [address] [place] [address] for possible need for CRRT for YADY due to inability to tolerate iHD due to sepsis/septic shock from unclear source. Patient remained intact improvement weaned off vasopressor support, transition to intermittent HD from CRRT. Patient with renal recovery, no longer requiring HD. Patient with multiple weaning attempts, occasionally tolerating trach collar however either tires out or has secretion issues requiring being placed back on vent. Status post colostomy placement  for assistance in maintaining chronic wounds clean, assist healing. For Chronic respiratory failure requiring mechanical ventilation will continue ventilator support. Clearly volume overloaded with large L sided pleural effusion. Given multidrug resistant organisms, ongoing eliquis, and chronic nature will defer drainage at his point. On PO bumex     For HFrEF will monitor. Poor prognosis. Partial code with NO DEFIBRILLATIONS    For Volume overload bumex PO     For Renal failure will monitor    For Chronic decubitus ulcers will turn as tolerated. Monitor for clinical signs of infection       Today was going to transfer to [place] [address] however given Abd distension and distended abd will watch for today. May transport tomorrow. Found Grossly enlarged Abd. Held TF. Suction to NG. Removed approx 1 L from abd. Will perform CT later today. To further assess etiology.   Will plan on restarting tube feed with prokinetic agent if no obstruction found. ICU Checklist       F - Feeding:  yes   A - Analgesia: Norco PRN  S - Sedation: None  T - DVT Prophylaxis: eliquis   H - Head of Bed: > 30 Degrees  U - Ulcer Prophylaxis: Pepcid (famotidine)   G - Glycemic Control: Insulin  S - Spontaneous Breathing Trial: Yes  B - Bowel Regimen: MiraLax  I - Indwelling Catheter:   Tubes: Tracheostomy  Lines: PICC Line  Drains: None          POD:  4 Days Post-Op    S/P:   Procedure(s):  LAPAROSCOPIC COLOSTOMY    Active Problem List:     Problem List  Never Reviewed            Codes Class    Moderate protein-calorie malnutrition (HCC) ICD-10-CM: E44.0  ICD-9-CM: 263.0         HFrEF (heart failure with reduced ejection fraction) (HCC) ICD-10-CM: I50.20  ICD-9-CM: 428.20         YADY (acute kidney injury) ([facility] [address] ICD-10-CM: N17.9  ICD-9-CM: 364. 9          Past Medical History:      has no past medical history on file. Past Surgical History:      has no past surgical history on file. Home Medications:     Prior to Admission medications    Medication Sig Start Date End Date Taking? Authorizing Provider   atorvastatin (LIPITOR) 40 mg tablet Take 40 mg by mouth nightly. PTA Med List from [facility] 8/8/22   Yes Provider, Historical   albuterol (PROVENTIL VENTOLIN) 2.5 mg /3 mL (0.083 %) nebu 2.5 mg by Nebulization route three (3) times daily as needed for Wheezing. PTA Med List from [facility] 8/8/22   Yes Provider, Historical   albuterol (PROVENTIL VENTOLIN) 2.5 mg /3 mL (0.083 %) nebu 2.5 mg by Nebulization route two (2) times a day. PTA Med List from [facility] 8/8/22   Yes Provider, Historical   sacubitriL-valsartan Gonzgretchen Milnelly) 24-26 mg tablet Take 0.5 Tablets by mouth two (2) times a day. PTA Med List from [facility] 8/8/22   Yes Provider, Historical   apixaban (Eliquis) 5 mg tablet Take 5 mg by mouth two (2) times a day.  PTA Med List from [facility] 8/8/22   Yes Provider, Historical   ferrous sulfate 300 mg (60 mg iron)/5 mL syrup Take 300 mg by mouth in the morning. PTA Med List from [address] [place] [address] 8/8/22   Yes Provider, Historical   fludrocortisone (FLORINEF) 0.1 mg tablet Take 0.1 mg by mouth in the morning. PTA Med List from [address] [place] [address] 8/8/22   Yes Provider, Historical   sodium chloride (Hyper-SaL) 7 % nebulizer solution 4 mL by Nebulization route two (2) times a day. PTA Med List from [address] [place] [address] 8/8/22   Yes Provider, Historical   insulin glargine (Lantus U-100 Insulin) 100 unit/mL injection 5 Units by SubCUTAneous route nightly. PTA Med List from [address] [place] [address] 8/8/22   Yes Provider, Historical   melatonin 3 mg tablet Take 3 mg by mouth nightly as needed for Insomnia. PTA Med List from [address] [place] [address] 8/8/22   Yes Provider, Historical   mexiletine (MEXITIL) 150 mg capsule Take 150 mg by mouth two (2) times a day. PTA Med List from [address] [place] [address] 8/8/22   Yes Provider, Historical   midodrine (PROAMATINE) 5 mg tablet Take 10 mg by mouth three (3) times daily. PTA Med List from [address] [place] [address] 8/8/22   Yes Provider, Historical   cholestyramine-aspartame (QUESTRAN LIGHT) 4 gram packet Take 4 g by mouth two (2) times a day. PTA Med List from [address] [place] [address] 8/8/22   Yes Provider, Historical   glycopyrrolate (RobinuL) 1 mg tablet Take 1 mg by mouth every eight (8) hours as needed. PTA Med List from [address] [place] [address] 8/8/22   Yes Provider, Historical   levothyroxine (synthroid) 50 mcg tablet Take 50 mcg by mouth Daily (before breakfast). PTA Med List from [address] [place] [address] 8/8/22   Yes Provider, Historical   HYDROcodone-acetaminophen (NORCO) 5-325 mg per tablet Take 1 Tablet by mouth every four (4) hours as needed for Pain. PTA Med List from [address] [place] [address] 8/8/22   Yes Provider, Historical   ascorbic acid, vitamin C, (VITAMIN C) 500 mg tablet Take 500 mg by mouth nightly. PTA Med List from [address] [place] [address] 8/8/22   Yes Provider, Historical   cholecalciferol (VITAMIN D3) (1000 Units /25 mcg) tablet Take 1,000 Units by mouth in the morning.  PTA Med List from [facility] 8/8/22   Yes Provider, Historical   ALPRAZolam (Xanax) 0.25 mg tablet Take 0.25 mg by mouth every eight (8) hours as needed for Anxiety. PTA Med List from [facility] 22   Yes Provider, Historical   zinc sulfate (ZINCATE) 50 mg zinc (220 mg) capsule Take 1 Capsule by mouth in the morning. PTA Med List from [facility] 22   Yes Provider, Historical   ondansetron (ZOFRAN) 4 mg/2 mL soln 4 mg by IntraVENous route every four (4) hours as needed for Nausea. PTA Med List from [facility] 22   Yes Provider, Historical   insulin regular (HumuLIN R Regular U-100 Insuln) 100 unit/mL injection by SubCUTAneous route every six (6) hours as needed. PTA Med List from [facility] 22   Yes Provider, Historical   TIGEcycline 50 mg IVPB 50 mg by IntraVENous route every twelve (12) hours. PTA Med List from [facility] 22   Yes Provider, Historical       Allergies/Social/Family History: Allergies   Allergen Reactions    Tape [Adhesive] Rash      Social History     Tobacco Use    Smoking status: Not on file    Smokeless tobacco: Not on file   Substance Use Topics    Alcohol use: Not on file      History reviewed. No pertinent family history. Objective:   Vital Signs:  Visit Vitals  /83   Pulse (!) 117   Temp 99.9 °F (37.7 °C)   Resp 21   Ht 5' 8\" (1.727 m)   Wt 66.3 kg (146 lb 2.6 oz)   SpO2 100%   BMI 22.22 kg/m²    O2 Flow Rate (L/min): 10 l/min O2 Device: Tracheostomy, Ventilator Temp (24hrs), Av.3 °F (36.8 °C), Min:97.8 °F (36.6 °C), Max:99.9 °F (37.7 °C)           Intake/Output:     Intake/Output Summary (Last 24 hours) at 2022 0942  Last data filed at 2022 0700  Gross per 24 hour   Intake 1725 ml   Output 1850 ml   Net -125 ml         Physical Exam:                           Head:               Normocephalic,  bitemporal muscle wasting. Eyes:               Conjunctivae/corneas clear. Neck:               Trach  Lungs:             Decreased L  Heart:              RRR  Abdomen:        Not distended.                             PEG, suprapubic urinary cath, colostomy  Extremities:     Muscle wasting --Skin:                  Eschar - heel., Large sacral wound   Neurologic:      Awake and interacting appropriately       LABS AND  DATA: Personally reviewed  Recent Labs     08/30/22 0055 08/29/22  0308   WBC 21.2* 17.0*   HGB 7.8* 7.8*   HCT 25.4* 24.0*   * 414*       Recent Labs     08/30/22 0055 08/29/22  0308    140   K 4.3 3.9    107   CO2 24 27   BUN 70* 72*   CREA 1.88* 2.12*   * 255*   CA 8.4* 8.3*   MG 1.8 1.9   PHOS 4.2  --        Recent Labs     08/30/22 0055 08/29/22 0308 08/28/22  0355   AP  --  132* 117   TP  --  6.9 6.4   ALB 2.0* 1.9* 1.8*   GLOB  --  5.0* 4.6*       No results for input(s): INR, PTP, APTT, INREXT, INREXT in the last 72 hours. No results for input(s): PHI, PCO2I, PO2I, FIO2I in the last 72 hours. No results for input(s): CPK, CKMB, TROIQ, BNPP in the last 72 hours. Hemodynamics:   PAP:   CO:     Wedge:   CI:     CVP:    SVR:       PVR:       Ventilator Settings:  Mode Rate Tidal Volume Pressure FiO2 PEEP   SIMV, Pressure control   0 ml  5 cm H2O 40 % 5 cm H20     Peak airway pressure: 11 cm H2O    Minute ventilation: 7.57 l/min        MEDS: Reviewed    Chest X-Ray:  CXR Results  (Last 48 hours)      None              ECHO:      Left Ventricle: Severely reduced left ventricular systolic function with a visually estimated EF of 15 - 20%. Left ventricle is dilated. Normal wall thickness. Severe global hypokinesis present. Mitral Valve: Mild regurgitation. Left Atrium: Left atrium is dilated. Pulmonary Arteries: Mild pulmonary hypertension present. Pericardium: Left pleural effusion. Technical qualifiers: Echo study was limited due to patient's condition. SPECIAL EQUIPMENT  None    DISPOSITION  Stay in ICU    CRITICAL CARE CONSULTANT NOTE  I had a face to face encounter with the patient, reviewed and interpreted patient data including clinical events, labs, images, vital signs, I/O's, and examined patient.   I have discussed the case and the plan and management of the patient's care with the consulting services, the bedside nurses and the respiratory therapist.      NOTE OF PERSONAL INVOLVEMENT IN CARE   This patient has a high probability of imminent, clinically significant deterioration, which requires the highest level of preparedness to intervene urgently. I participated in the decision-making and personally managed or directed the management of the following life and organ supporting interventions that required my frequent assessment to treat or prevent imminent deterioration. I personally spent 45 minutes of critical care time. This is time spent at this critically ill patient's bedside actively involved in patient care as well as the coordination of care and discussions with the patient's family. This does not include any procedural time which has been billed separately.       [facility] [place]

## 2022-08-31 ENCOUNTER — APPOINTMENT (OUTPATIENT)
Dept: CT IMAGING | Age: 42
DRG: 853 | End: 2022-08-31
Attending: ANESTHESIOLOGY
Payer: MEDICARE

## 2022-08-31 LAB
ALBUMIN SERPL-MCNC: 1.8 G/DL (ref 3.5–5)
ALBUMIN SERPL-MCNC: 1.9 G/DL (ref 3.5–5)
ALBUMIN/GLOB SERPL: 0.4 {RATIO} (ref 1.1–2.2)
ALP SERPL-CCNC: 153 U/L (ref 45–117)
ALT SERPL-CCNC: 35 U/L (ref 12–78)
ANION GAP SERPL CALC-SCNC: 10 MMOL/L (ref 5–15)
ANION GAP SERPL CALC-SCNC: 9 MMOL/L (ref 5–15)
ARTERIAL PATENCY WRIST A: ABNORMAL
AST SERPL-CCNC: 41 U/L (ref 15–37)
BASE DEFICIT BLDA-SCNC: 9.1 MMOL/L
BASOPHILS # BLD: 0 K/UL (ref 0–0.1)
BASOPHILS NFR BLD: 0 % (ref 0–1)
BDY SITE: ABNORMAL
BILIRUB SERPL-MCNC: 0.4 MG/DL (ref 0.2–1)
BUN SERPL-MCNC: 68 MG/DL (ref 6–20)
BUN SERPL-MCNC: 71 MG/DL (ref 6–20)
BUN/CREAT SERPL: 39 (ref 12–20)
BUN/CREAT SERPL: 41 (ref 12–20)
CALCIUM SERPL-MCNC: 8.2 MG/DL (ref 8.5–10.1)
CALCIUM SERPL-MCNC: 8.4 MG/DL (ref 8.5–10.1)
CHLORIDE SERPL-SCNC: 107 MMOL/L (ref 97–108)
CHLORIDE SERPL-SCNC: 107 MMOL/L (ref 97–108)
CO2 SERPL-SCNC: 23 MMOL/L (ref 21–32)
CO2 SERPL-SCNC: 23 MMOL/L (ref 21–32)
CREAT SERPL-MCNC: 1.74 MG/DL (ref 0.7–1.3)
CREAT SERPL-MCNC: 1.74 MG/DL (ref 0.7–1.3)
DIFFERENTIAL METHOD BLD: ABNORMAL
EOSINOPHIL # BLD: 0.2 K/UL (ref 0–0.4)
EOSINOPHIL NFR BLD: 1 % (ref 0–7)
ERYTHROCYTE [DISTWIDTH] IN BLOOD BY AUTOMATED COUNT: 15.9 % (ref 11.5–14.5)
FIO2 ON VENT: 100 %
GAS FLOW.O2 SETTING OXYMISER: 16 L/MIN
GLOBULIN SER CALC-MCNC: 4.7 G/DL (ref 2–4)
GLUCOSE SERPL-MCNC: 96 MG/DL (ref 65–100)
GLUCOSE SERPL-MCNC: 97 MG/DL (ref 65–100)
HCO3 BLDA-SCNC: 19 MMOL/L (ref 22–26)
HCT VFR BLD AUTO: 24.1 % (ref 36.6–50.3)
HGB BLD-MCNC: 7.8 G/DL (ref 12.1–17)
IMM GRANULOCYTES # BLD AUTO: 0.1 K/UL (ref 0–0.04)
IMM GRANULOCYTES NFR BLD AUTO: 1 % (ref 0–0.5)
LYMPHOCYTES # BLD: 0.9 K/UL (ref 0.8–3.5)
LYMPHOCYTES NFR BLD: 6 % (ref 12–49)
MAGNESIUM SERPL-MCNC: 1.7 MG/DL (ref 1.6–2.4)
MCH RBC QN AUTO: 28.1 PG (ref 26–34)
MCHC RBC AUTO-ENTMCNC: 32.4 G/DL (ref 30–36.5)
MCV RBC AUTO: 86.7 FL (ref 80–99)
MONOCYTES # BLD: 0.9 K/UL (ref 0–1)
MONOCYTES NFR BLD: 7 % (ref 5–13)
NEUTS SEG # BLD: 11.6 K/UL (ref 1.8–8)
NEUTS SEG NFR BLD: 84 % (ref 32–75)
NRBC # BLD: 0 K/UL (ref 0–0.01)
NRBC BLD-RTO: 0 PER 100 WBC
PCO2 BLDA: 47 MMHG (ref 35–45)
PEEP RESPIRATORY: 5 CM[H2O]
PH BLDA: 7.22 [PH] (ref 7.35–7.45)
PHOSPHATE SERPL-MCNC: 3.6 MG/DL (ref 2.6–4.7)
PLATELET # BLD AUTO: 444 K/UL (ref 150–400)
PMV BLD AUTO: 10.4 FL (ref 8.9–12.9)
PO2 BLDA: 99 MMHG (ref 80–100)
POTASSIUM SERPL-SCNC: 3.5 MMOL/L (ref 3.5–5.1)
POTASSIUM SERPL-SCNC: 3.5 MMOL/L (ref 3.5–5.1)
PRESSURE SUPPORT SETTING VENT: 5 CM[H2O]
PROT SERPL-MCNC: 6.5 G/DL (ref 6.4–8.2)
RBC # BLD AUTO: 2.78 M/UL (ref 4.1–5.7)
SAO2 % BLD: 96 % (ref 92–97)
SAO2% DEVICE SAO2% SENSOR NAME: ABNORMAL
SERVICE CMNT-IMP: ABNORMAL
SODIUM SERPL-SCNC: 139 MMOL/L (ref 136–145)
SODIUM SERPL-SCNC: 140 MMOL/L (ref 136–145)
SPECIMEN SITE: ABNORMAL
VENTILATION MODE VENT: ABNORMAL
WBC # BLD AUTO: 13.7 K/UL (ref 4.1–11.1)

## 2022-08-31 PROCEDURE — 77030018798 HC PMP KT ENTRL FED COVD -A

## 2022-08-31 PROCEDURE — 80053 COMPREHEN METABOLIC PANEL: CPT

## 2022-08-31 PROCEDURE — 77030041530

## 2022-08-31 PROCEDURE — 74011250637 HC RX REV CODE- 250/637: Performed by: STUDENT IN AN ORGANIZED HEALTH CARE EDUCATION/TRAINING PROGRAM

## 2022-08-31 PROCEDURE — 82803 BLOOD GASES ANY COMBINATION: CPT

## 2022-08-31 PROCEDURE — 74176 CT ABD & PELVIS W/O CONTRAST: CPT

## 2022-08-31 PROCEDURE — 94003 VENT MGMT INPAT SUBQ DAY: CPT

## 2022-08-31 PROCEDURE — 74011250637 HC RX REV CODE- 250/637: Performed by: NURSE PRACTITIONER

## 2022-08-31 PROCEDURE — 36600 WITHDRAWAL OF ARTERIAL BLOOD: CPT

## 2022-08-31 PROCEDURE — 36415 COLL VENOUS BLD VENIPUNCTURE: CPT

## 2022-08-31 PROCEDURE — 85025 COMPLETE CBC W/AUTO DIFF WBC: CPT

## 2022-08-31 PROCEDURE — 74011250636 HC RX REV CODE- 250/636: Performed by: ANESTHESIOLOGY

## 2022-08-31 PROCEDURE — 74011250636 HC RX REV CODE- 250/636

## 2022-08-31 PROCEDURE — 77030018861

## 2022-08-31 PROCEDURE — 74011636637 HC RX REV CODE- 636/637: Performed by: NURSE PRACTITIONER

## 2022-08-31 PROCEDURE — 74011250637 HC RX REV CODE- 250/637: Performed by: INTERNAL MEDICINE

## 2022-08-31 PROCEDURE — 2709999900 HC NON-CHARGEABLE SUPPLY

## 2022-08-31 PROCEDURE — 74011000636 HC RX REV CODE- 636

## 2022-08-31 PROCEDURE — 83735 ASSAY OF MAGNESIUM: CPT

## 2022-08-31 PROCEDURE — 74011000250 HC RX REV CODE- 250: Performed by: NURSE PRACTITIONER

## 2022-08-31 PROCEDURE — 65620000000 HC RM CCU GENERAL

## 2022-08-31 PROCEDURE — 80069 RENAL FUNCTION PANEL: CPT

## 2022-08-31 RX ORDER — BUMETANIDE 1 MG/1
1 TABLET ORAL DAILY
Status: DISCONTINUED | OUTPATIENT
Start: 2022-09-01 | End: 2022-09-01 | Stop reason: HOSPADM

## 2022-08-31 RX ORDER — NALOXONE HYDROCHLORIDE 0.4 MG/ML
0.2 INJECTION, SOLUTION INTRAMUSCULAR; INTRAVENOUS; SUBCUTANEOUS ONCE
Status: COMPLETED | OUTPATIENT
Start: 2022-08-31 | End: 2022-08-31

## 2022-08-31 RX ORDER — GLYCOPYRROLATE 0.2 MG/ML
0.1 INJECTION INTRAMUSCULAR; INTRAVENOUS
Status: DISCONTINUED | OUTPATIENT
Start: 2022-08-31 | End: 2022-09-01 | Stop reason: HOSPADM

## 2022-08-31 RX ORDER — NALOXONE HYDROCHLORIDE 0.4 MG/ML
INJECTION, SOLUTION INTRAMUSCULAR; INTRAVENOUS; SUBCUTANEOUS
Status: COMPLETED
Start: 2022-08-31 | End: 2022-08-31

## 2022-08-31 RX ORDER — METOCLOPRAMIDE HYDROCHLORIDE 5 MG/ML
5 INJECTION INTRAMUSCULAR; INTRAVENOUS EVERY 6 HOURS
Status: DISCONTINUED | OUTPATIENT
Start: 2022-08-31 | End: 2022-09-01 | Stop reason: HOSPADM

## 2022-08-31 RX ORDER — LORAZEPAM 1 MG/1
0.5 TABLET ORAL
Status: DISCONTINUED | OUTPATIENT
Start: 2022-08-31 | End: 2022-09-01 | Stop reason: HOSPADM

## 2022-08-31 RX ORDER — ALBUTEROL SULFATE 4 MG/1
4 TABLET, FILM COATED, EXTENDED RELEASE ORAL EVERY 12 HOURS
Status: DISCONTINUED | OUTPATIENT
Start: 2022-08-31 | End: 2022-08-31

## 2022-08-31 RX ADMIN — BUMETANIDE 0.5 MG: 1 TABLET ORAL at 08:56

## 2022-08-31 RX ADMIN — METOCLOPRAMIDE 5 MG: 5 INJECTION, SOLUTION INTRAMUSCULAR; INTRAVENOUS at 17:47

## 2022-08-31 RX ADMIN — APIXABAN 5 MG: 5 TABLET, FILM COATED ORAL at 04:50

## 2022-08-31 RX ADMIN — APIXABAN 5 MG: 5 TABLET, FILM COATED ORAL at 15:43

## 2022-08-31 RX ADMIN — MIDODRINE HYDROCHLORIDE 20 MG: 5 TABLET ORAL at 22:54

## 2022-08-31 RX ADMIN — Medication 5 UNITS: at 08:55

## 2022-08-31 RX ADMIN — MEXILETINE HYDROCHLORIDE 150 MG: 150 CAPSULE ORAL at 04:50

## 2022-08-31 RX ADMIN — FLUDROCORTISONE ACETATE 0.1 MG: 0.1 TABLET ORAL at 08:56

## 2022-08-31 RX ADMIN — HYDROCODONE BITARTRATE AND ACETAMINOPHEN 1 TABLET: 10; 325 TABLET ORAL at 10:41

## 2022-08-31 RX ADMIN — COLLAGENASE SANTYL: 250 OINTMENT TOPICAL at 08:57

## 2022-08-31 RX ADMIN — ATORVASTATIN CALCIUM 40 MG: 40 TABLET, FILM COATED ORAL at 22:54

## 2022-08-31 RX ADMIN — ACETAMINOPHEN 650 MG: 325 TABLET ORAL at 22:55

## 2022-08-31 RX ADMIN — Medication 10 ML: at 22:55

## 2022-08-31 RX ADMIN — NALOXONE HYDROCHLORIDE 0.2 MG: 0.4 INJECTION, SOLUTION INTRAMUSCULAR; INTRAVENOUS; SUBCUTANEOUS at 12:05

## 2022-08-31 RX ADMIN — Medication 10 ML: at 15:44

## 2022-08-31 RX ADMIN — MINERAL SUPPLEMENT IRON 300 MG / 5 ML STRENGTH LIQUID 100 PER BOX UNFLAVORED 300 MG: at 08:56

## 2022-08-31 RX ADMIN — LORAZEPAM 1 MG: 1 TABLET ORAL at 08:56

## 2022-08-31 RX ADMIN — CHLORHEXIDINE GLUCONATE 15 ML: 1.2 RINSE ORAL at 22:55

## 2022-08-31 RX ADMIN — LEVOTHYROXINE SODIUM 50 MCG: 0.05 TABLET ORAL at 06:50

## 2022-08-31 RX ADMIN — MINERAL SUPPLEMENT IRON 300 MG / 5 ML STRENGTH LIQUID 100 PER BOX UNFLAVORED 300 MG: at 17:47

## 2022-08-31 RX ADMIN — MEXILETINE HYDROCHLORIDE 150 MG: 150 CAPSULE ORAL at 15:43

## 2022-08-31 RX ADMIN — MIDODRINE HYDROCHLORIDE 20 MG: 5 TABLET ORAL at 15:43

## 2022-08-31 RX ADMIN — METOCLOPRAMIDE 5 MG: 5 INJECTION, SOLUTION INTRAMUSCULAR; INTRAVENOUS at 12:05

## 2022-08-31 RX ADMIN — CHLORHEXIDINE GLUCONATE 15 ML: 1.2 RINSE ORAL at 08:58

## 2022-08-31 RX ADMIN — MIDODRINE HYDROCHLORIDE 20 MG: 5 TABLET ORAL at 06:50

## 2022-08-31 RX ADMIN — IOHEXOL: 240 INJECTION, SOLUTION INTRATHECAL; INTRAVASCULAR; INTRAVENOUS; ORAL at 00:40

## 2022-08-31 NOTE — PROGRESS NOTES
Name: Cl Irwin MRN: [id number]   : 1980 Hospital: . [address]   Date:  22       IMPRESSION:   YADY, started on iHD initially. off HD. Scr is stable and improving. Patient is non oliguric. Hypotension resolved, off iv pressor, on midodrine, florinef  Hypophosphatemia- resolved  Debility with multiple pressure ulcers  Respiratory failure - on vent  Anemia of chronic disease-   Hyperkalemia - resolved with RRT  Protein deficiency  Wounds  Hypokalemia - corrected. K - 3.5  S/p colostomy  to keep the sacral wounds clean      PLAN:   S/p HD , no need for HD, Creatinine is stable and improving, good urine output  S/p Iv Bumex  C/w po bumex, increase to 1 mg   Replete potassium prn  Follow lytes. Midodrine 20 mg tid   Epo increased to 14K/week, po Iron  Tube feeding nepro  Dose meds for his GFR. Avoid NSAIDs + IV contrast.  Abx per ICU team  Plan to DC to LTAC  Will see prn     Subjective/Interval History:   I have reviewed the flowsheet and previous days notes. Seen on CRRT, awake and alert, denies pain/SOB with nodding head      F/U - YADY , CRRT --> 2022    Remains on CRRT + pressors. No new complaints were offered.  seen on CRRT, d/w ICU RN  8- CRRT stopped, BP stable on minimal Levophed, getting NeutraPhos  8/15 awake and alert, on vent. BP stable, had 175 ml UO yesterday, 150 ml today sofar. Cr 1.1. will add bumex if BP toleerates, no HD today  , awake on vent. UO has increased had 1 lit yesterday with bumex, cr increasing. BP stable, will hold HD today again   sleeping on vent, arousable, BP stable in 120s, had 1100 ml u/o and 500 overnight. Cr higher  , awake, off vent on trach collar, pulled his manjula this am, UO up       - F/U - YADY, Hx of HD --> 22    No new complaints. 22 - F/U - Yady , Hx of HD --> 22    No complaints. 22 - F/U YADY , Hx of HD    Unable to obtain the ROS.       22 --> F/U YADY, Hx of HD  No new complaints.  awake and alert, on trach collar, good uo, Cr stable   Pt seen and examined , back on vent, cr improving, BUN higher    22 --> F/U YADY, Hx of HD    On vent support    , awake, on vent, had colostomy, good urine output, cr stable    22 Seems to be comfortable. Resting in bed, asleep. No acute events are reported.  late note entry: seen and examined, renal function stable   seen and examined, Discharge on hold due to N/V. Renal function stable, diuresing adequately-RN at bedside   remains on vent, awake, denies pain    Objective:   Vital Signs:    Visit Vitals  BP 95/73   Pulse (!) 130   Temp 98.4 °F (36.9 °C)   Resp 28   Ht 5' 8\" (1.727 m)   Wt 61.2 kg (134 lb 14.7 oz)   SpO2 (!) 89%   BMI 20.51 kg/m²       O2 Device: Ventilator, Tracheostomy   O2 Flow Rate (L/min): 10 l/min   Temp (24hrs), Av.5 °F (36.9 °C), Min:98.4 °F (36.9 °C), Max:98.6 °F (37 °C)       Intake/Output:   Last shift:       07 - 1900  In: 140   Out: 195 [Urine:95; Drains:50]  Last 3 shifts: 1901 -  0700  In: 1070   Out: 2035 [Urine:1785; Drains:150]    Intake/Output Summary (Last 24 hours) at 2022 1228  Last data filed at 2022 1100  Gross per 24 hour   Intake 230 ml   Output 1430 ml   Net -1200 ml          Physical Exam:      Seen in CCU - Bed . General:     Awake on vent support                         Head:   Normocephalic,  bitemporal muscle wasting. Eyes:   Conjunctivae/corneas clear. Neck:  Trach    Lungs:   BL rhonchi. Heart:   Reg, No S 3 gallop, no pericardial rub  . Abdomen:   Not distended.                             PEG, suprapubic urinary cath, colostomy    Extremities: Muscle wasting --> trace edema     Skin:     Eschar - heel., Large sacral wound     Psych:  Calm    Neurologic: Awake, less interactive  DATA:  Labs:  Recent Labs     22  0418 22  0417 22  0055 22  0308    139 138 140   K 3.5 3.5 4.3 3.9  107 107 107   CO2 23 23 24 27   BUN 68* 71* 70* 72*   CREA 1.74* 1.74* 1.88* 2.12*   CA 8.4* 8.2* 8.4* 8.3*   ALB 1.8* 1.9* 2.0* 1.9*   PHOS  --  3.6 4.2  --    MG  --  1.7 1.8 1.9       Recent Labs     08/31/22  0417 08/30/22  0055 08/29/22  0308   WBC 13.7* 21.2* 17.0*   HGB 7.8* 7.8* 7.8*   HCT 24.1* 25.4* 24.0*   * 497* 414*       No results for input(s): YAMIL, KU, CLU, CREAU in the last 72 hours.     No lab exists for component: PROU    Total time spent with patient:           Care Plan discussed with: intensivist        Kassandra Pairkh MD

## 2022-08-31 NOTE — PROGRESS NOTES
[facility]    ICU TEAM Progress Note    Name: Cl Irwin   : 1980   MRN: [id number]   Date: 2022      Assessment and Plan:     ICU Problems:  Chronic respiratory failure requiring mechanical ventilation  HFrEF  hypotension  Volume overload  Chronic Renal failure  Chronic decubitus ulcers  Multidrug resistant organism colonization  At risk for sepsis  Gastric outlet obstruction        Briefly,pt is 75-year-old male with known past medical history of multiple chronic issues as listed in problem list, who presented to [facility] ED after being sent from [address] [place] [address] for possible need for CRRT for YADY due to inability to tolerate iHD due to sepsis/septic shock from unclear source. Patient remained intact improvement weaned off vasopressor support, transition to intermittent HD from CRRT. Patient with renal recovery, no longer requiring HD. Patient with multiple weaning attempts, occasionally tolerating trach collar however either tires out or has secretion issues requiring being placed back on vent. Status post colostomy placement  for assistance in maintaining chronic wounds clean, assist healing. For Chronic respiratory failure requiring mechanical ventilation will continue ventilator support. Clearly volume overloaded with large L sided pleural effusion. Given multidrug resistant organisms, ongoing eliquis, and chronic nature will defer drainage at his point. On PO bumex     For HFrEF will monitor. Poor prognosis. Partial code with NO DEFIBRILLATIONS    For hypotension pt is on midodrine and florinef. Has had intermittent bouts of hypotension. Episodes occur with bradycardia, as well as decreased wakefulness. Of note pt is able to be aroused during episodes by voice and these episodes frequently occur after opioid administration. I have removed opioids from [facility]   We put on AC on vent to correct mild respiratory acidosis, and provided glycopyrolate for secretions and prevention of bradycardia. For Volume overload bumex PO     For Renal failure will monitor. Has been off dialysis and is doing well. I increased the Bumex dose to 1 mg BID given L sided pleural effusion. For Chronic decubitus ulcers will turn as tolerated. Monitor for clinical signs of infection       8/30 was going to transfer to [facility] however given Abd distension and distended abd. CT with no obstruction. Restarted TF with reglan. Will evaluate tolerance        ICU Checklist       F - Feeding:  yes   A - Analgesia: Norco PRN  S - Sedation: None  T - DVT Prophylaxis: eliquis   H - Head of Bed: > 30 Degrees  U - Ulcer Prophylaxis: Pepcid (famotidine)   G - Glycemic Control: Insulin  S - Spontaneous Breathing Trial: Yes  B - Bowel Regimen: MiraLax  I - Indwelling Catheter:   Tubes: Tracheostomy  Lines: PICC Line  Drains: None          POD:  4 Days Post-Op    S/P:   Procedure(s):  LAPAROSCOPIC COLOSTOMY    Active Problem List:     Problem List  Never Reviewed            Codes Class    Moderate protein-calorie malnutrition (HCC) ICD-10-CM: E44.0  ICD-9-CM: 263.0         HFrEF (heart failure with reduced ejection fraction) ([facility]) ICD-10-CM: I50.20  ICD-9-CM: 428.20         YADY (acute kidney injury) ([facility] ICD-10-CM: N17.9  ICD-9-CM: 962. 9        Past Medical History:      has no past medical history on file. Past Surgical History:      has no past surgical history on file. Home Medications:     Prior to Admission medications    Medication Sig Start Date End Date Taking? Authorizing Provider   atorvastatin (LIPITOR) 40 mg tablet Take 40 mg by mouth nightly. PTA Med List from [address] [place] [address] 8/8/22   Yes Provider, Historical   albuterol (PROVENTIL VENTOLIN) 2.5 mg /3 mL (0.083 %) nebu 2.5 mg by Nebulization route three (3) times daily as needed for Wheezing. PTA Med List from [address] [place] [address] 8/8/22   Yes Provider, Historical   albuterol (PROVENTIL VENTOLIN) 2.5 mg /3 mL (0.083 %) nebu 2.5 mg by Nebulization route two (2) times a day.  PTA Med List from [facility] 8/8/22   Yes Provider, Historical   sacubitriL-valsartan Marzetta Hum) 24-26 mg tablet Take 0.5 Tablets by mouth two (2) times a day. PTA Med List from [facility] 8/8/22   Yes Provider, Historical   apixaban (Eliquis) 5 mg tablet Take 5 mg by mouth two (2) times a day. PTA Med List from [facility] 8/8/22   Yes Provider, Historical   ferrous sulfate 300 mg (60 mg iron)/5 mL syrup Take 300 mg by mouth in the morning. PTA Med List from [facility] 8/8/22   Yes Provider, Historical   fludrocortisone (FLORINEF) 0.1 mg tablet Take 0.1 mg by mouth in the morning. PTA Med List from [facility] 8/8/22   Yes Provider, Historical   sodium chloride (Hyper-SaL) 7 % nebulizer solution 4 mL by Nebulization route two (2) times a day. PTA Med List from [facility] 8/8/22   Yes Provider, Historical   insulin glargine (Lantus U-100 Insulin) 100 unit/mL injection 5 Units by SubCUTAneous route nightly. PTA Med List from [facility] 8/8/22   Yes Provider, Historical   melatonin 3 mg tablet Take 3 mg by mouth nightly as needed for Insomnia. PTA Med List from [facility] 8/8/22   Yes Provider, Historical   mexiletine (MEXITIL) 150 mg capsule Take 150 mg by mouth two (2) times a day. PTA Med List from [facility] 8/8/22   Yes Provider, Historical   midodrine (PROAMATINE) 5 mg tablet Take 10 mg by mouth three (3) times daily. PTA Med List from [facility] 8/8/22   Yes Provider, Historical   cholestyramine-aspartame (QUESTRAN LIGHT) 4 gram packet Take 4 g by mouth two (2) times a day. PTA Med List from [facility] 8/8/22   Yes Provider, Historical   glycopyrrolate (RobinuL) 1 mg tablet Take 1 mg by mouth every eight (8) hours as needed. PTA Med List from [facility] 8/8/22   Yes Provider, Historical   levothyroxine (synthroid) 50 mcg tablet Take 50 mcg by mouth Daily (before breakfast). PTA Med List from [facility] 8/8/22   Yes Provider, Historical   HYDROcodone-acetaminophen (NORCO) 5-325 mg per tablet Take 1 Tablet by mouth every four (4) hours as needed for Pain.  PTA Med List from [facility] 22   Yes Provider, Historical   ascorbic acid, vitamin C, (VITAMIN C) 500 mg tablet Take 500 mg by mouth nightly. PTA Med List from [facility] 22   Yes Provider, Historical   cholecalciferol (VITAMIN D3) (1000 Units /25 mcg) tablet Take 1,000 Units by mouth in the morning. PTA Med List from [facility] 22   Yes Provider, Historical   ALPRAZolam (Xanax) 0.25 mg tablet Take 0.25 mg by mouth every eight (8) hours as needed for Anxiety. PTA Med List from [facility] 22   Yes Provider, Historical   zinc sulfate (ZINCATE) 50 mg zinc (220 mg) capsule Take 1 Capsule by mouth in the morning. PTA Med List from [facility] 22   Yes Provider, Historical   ondansetron (ZOFRAN) 4 mg/2 mL soln 4 mg by IntraVENous route every four (4) hours as needed for Nausea. PTA Med List from [facility] 22   Yes Provider, Historical   insulin regular (HumuLIN R Regular U-100 Insuln) 100 unit/mL injection by SubCUTAneous route every six (6) hours as needed. PTA Med List from [facility] 22   Yes Provider, Historical   TIGEcycline 50 mg IVPB 50 mg by IntraVENous route every twelve (12) hours. PTA Med List from [facility] 22   Yes Provider, Historical       Allergies/Social/Family History: Allergies   Allergen Reactions    Tape [Adhesive] Rash      Social History     Tobacco Use    Smoking status: Not on file    Smokeless tobacco: Not on file   Substance Use Topics    Alcohol use: Not on file      History reviewed. No pertinent family history.       Objective:   Vital Signs:  Visit Vitals  /69 (BP 1 Location: Left upper arm, BP Patient Position: At rest)   Pulse 99   Temp 98.4 °F (36.9 °C)   Resp 16   Ht 5' 8\" (1.727 m)   Wt 61.2 kg (134 lb 14.7 oz)   SpO2 98%   BMI 20.51 kg/m²    O2 Flow Rate (L/min): 10 l/min O2 Device: Ventilator, Tracheostomy Temp (24hrs), Av.5 °F (36.9 °C), Min:98.4 °F (36.9 °C), Max:98.8 °F (37.1 °C)           Intake/Output:     Intake/Output Summary (Last 24 hours) at 2022 0949  Last data filed at 2022 0800  Gross per 24 hour   Intake 280 ml   Output 2230 ml   Net -1950 ml         Physical Exam:                           Head:               Normocephalic,  bitemporal muscle wasting. Eyes:               Conjunctivae/corneas clear. Neck:               Trach  Lungs:             Decreased L  Heart:              RRR  Abdomen:        distended                           PEG, suprapubic urinary cath, colostomy  Extremities:     Muscle wasting --Skin:                  Eschar - heel., Large sacral wound   Neurologic:      Awake and interacting appropriately       LABS AND  DATA: Personally reviewed  Recent Labs     08/31/22 0417 08/30/22 0055   WBC 13.7* 21.2*   HGB 7.8* 7.8*   HCT 24.1* 25.4*   * 497*       Recent Labs     08/31/22 0418 08/31/22 0417 08/30/22 0055    139 138   K 3.5 3.5 4.3    107 107   CO2 23 23 24   BUN 68* 71* 70*   CREA 1.74* 1.74* 1.88*   GLU 96 97 251*   CA 8.4* 8.2* 8.4*   MG  --  1.7 1.8   PHOS  --  3.6 4.2       Recent Labs     08/31/22 0418 08/31/22 0417 08/30/22 0055 08/29/22  0308   *  --   --  132*   TP 6.5  --   --  6.9   ALB 1.8* 1.9*   < > 1.9*   GLOB 4.7*  --   --  5.0*    < > = values in this interval not displayed. No results for input(s): INR, PTP, APTT, INREXT, INREXT in the last 72 hours. No results for input(s): PHI, PCO2I, PO2I, FIO2I in the last 72 hours. No results for input(s): CPK, CKMB, TROIQ, BNPP in the last 72 hours. Hemodynamics:   PAP:   CO:     Wedge:   CI:     CVP:    SVR:       PVR:       Ventilator Settings:  Mode Rate Tidal Volume Pressure FiO2 PEEP   SIMV, Pressure control   0 ml  5 cm H2O 21 % 5 cm H20     Peak airway pressure: 26 cm H2O    Minute ventilation: 4.83 l/min        MEDS: Reviewed    Chest X-Ray:  CXR Results  (Last 48 hours)      None              ECHO:      Left Ventricle: Severely reduced left ventricular systolic function with a visually estimated EF of 15 - 20%. Left ventricle is dilated.  Normal wall thickness. Severe global hypokinesis present. Mitral Valve: Mild regurgitation. Left Atrium: Left atrium is dilated. Pulmonary Arteries: Mild pulmonary hypertension present. Pericardium: Left pleural effusion. Technical qualifiers: Echo study was limited due to patient's condition. SPECIAL EQUIPMENT  None    DISPOSITION  Stay in ICU    CRITICAL CARE CONSULTANT NOTE  I had a face to face encounter with the patient, reviewed and interpreted patient data including clinical events, labs, images, vital signs, I/O's, and examined patient. I have discussed the case and the plan and management of the patient's care with the consulting services, the bedside nurses and the respiratory therapist.      NOTE OF PERSONAL INVOLVEMENT IN CARE   This patient has a high probability of imminent, clinically significant deterioration, which requires the highest level of preparedness to intervene urgently. I participated in the decision-making and personally managed or directed the management of the following life and organ supporting interventions that required my frequent assessment to treat or prevent imminent deterioration. I personally spent 40 minutes of critical care time. This is time spent at this critically ill patient's bedside actively involved in patient care as well as the coordination of care and discussions with the patient's family. This does not include any procedural time which has been billed separately.       [facility]

## 2022-08-31 NOTE — PROGRESS NOTES
1930: Bedside and Verbal shift change report given to Eva Houston RN (oncoming nurse) by Kacey Olivier RN (offgoing nurse). Report included the following information SBAR, Kardex, ED Summary, OR Summary, Procedure Summary, Intake/Output, MAR, Recent Results, Cardiac Rhythm ST, and Alarm Parameters . 1945: Lines verified. 0215: Pt transported to CT.    0730: Bedside and Verbal shift change report given to Linad Pang RN (oncoming nurse) by Eva Houston RN (offgoing nurse). Report included the following information SBAR, Kardex, ED Summary, OR Summary, Procedure Summary, Intake/Output, MAR, Recent Results, Cardiac Rhythm ST, and Alarm Parameters .

## 2022-08-31 NOTE — PROGRESS NOTES
0745: Bedside shift change report given to [address] [place] [address] (oncoming nurse) by Kavya Morales (offgoing nurse). Report included the following information SBAR, Kardex, Intake/Output, MAR, and Recent Results. 0900: Pt restless in bed attempting to pull at trach. PRN ativan given. 1000: IDR held. 1030: Pt continues to be restless and complains of back pain. HR 130s- PRN Norco given. 1135: Pt HR dropped to 60 with hypotension. RN at bedside- pulse noted, Pt unresponsive to sternal rub. ABGs obtained- Dr Odette julian. Orders for narcan admin. RT at bedside adjusting vent settings to A/C.     1143: Pt drowsy; responds to voice. BP recovering without intervention. 1200: MD at bedside. BP soft, HR stable. 1930: Bedside shift change report given to [address] (oncoming nurse) by [facility] (offgoing nurse). Report included the following information SBAR, Kardex, Intake/Output, MAR, and Recent Results.

## 2022-08-31 NOTE — PROGRESS NOTES
JADEN: Anticipate discharge to [facility] in [address] [place] [address] pending medical progress. Nursing staff call report to [phone] ext [phone]. Transportation ALS through [facility] time pending. RUR: 17%     Emergency contact: Farzana Lopez, sister, [phone]  Ignacio Borden, brother, [phone] or [phone]     Disposition: Patient admitted from [address] [place] [address] 8/9 for YADY. Patient has a trach and is on a vent. Hx: paraplegia, skin ulcers, afib, chronic sepsis, cardiac arrest, CVA, chronic loya, tracheostomy ventilator, gtube, pulmonary embolism, systolic CHF, HTN, HLD, diabetes type II. Patient ready for discharge per interdisciplinary rounds. Insurance authorization still in place. CM spoke with [employer] of [address] [place] [address] who asked that  schedule transportation for tomorrow, 9/1, in the evening to ensure bed availability. CM contacted [facility] and is awaiting a ride time. CM will continue to follow.     Saloni Avalos, [employer],[address]  [phone]

## 2022-09-01 ENCOUNTER — APPOINTMENT (OUTPATIENT)
Dept: GENERAL RADIOLOGY | Age: 42
DRG: 853 | End: 2022-09-01
Attending: STUDENT IN AN ORGANIZED HEALTH CARE EDUCATION/TRAINING PROGRAM
Payer: MEDICARE

## 2022-09-01 VITALS
WEIGHT: 137.57 LBS | TEMPERATURE: 97.7 F | SYSTOLIC BLOOD PRESSURE: 102 MMHG | OXYGEN SATURATION: 100 % | HEART RATE: 96 BPM | HEIGHT: 68 IN | BODY MASS INDEX: 20.85 KG/M2 | DIASTOLIC BLOOD PRESSURE: 78 MMHG | RESPIRATION RATE: 20 BRPM

## 2022-09-01 LAB
ALBUMIN SERPL-MCNC: 1.8 G/DL (ref 3.5–5)
ANION GAP SERPL CALC-SCNC: 10 MMOL/L (ref 5–15)
BASE DEFICIT BLD-SCNC: 2 MMOL/L
BASOPHILS # BLD: 0.1 K/UL (ref 0–0.1)
BASOPHILS NFR BLD: 1 % (ref 0–1)
BUN SERPL-MCNC: 70 MG/DL (ref 6–20)
BUN/CREAT SERPL: 37 (ref 12–20)
CA-I BLD-SCNC: 1.19 MMOL/L (ref 1.12–1.32)
CALCIUM SERPL-MCNC: 8.2 MG/DL (ref 8.5–10.1)
CHLORIDE SERPL-SCNC: 108 MMOL/L (ref 97–108)
CO2 SERPL-SCNC: 23 MMOL/L (ref 21–32)
CREAT SERPL-MCNC: 1.88 MG/DL (ref 0.7–1.3)
DIFFERENTIAL METHOD BLD: ABNORMAL
EOSINOPHIL # BLD: 0.2 K/UL (ref 0–0.4)
EOSINOPHIL NFR BLD: 2 % (ref 0–7)
ERYTHROCYTE [DISTWIDTH] IN BLOOD BY AUTOMATED COUNT: 16 % (ref 11.5–14.5)
GLUCOSE SERPL-MCNC: 128 MG/DL (ref 65–100)
HCO3 BLD-SCNC: 22.8 MMOL/L (ref 22–26)
HCT VFR BLD AUTO: 22.6 % (ref 36.6–50.3)
HGB BLD-MCNC: 7.3 G/DL (ref 12.1–17)
IMM GRANULOCYTES # BLD AUTO: 0.1 K/UL (ref 0–0.04)
IMM GRANULOCYTES NFR BLD AUTO: 1 % (ref 0–0.5)
LYMPHOCYTES # BLD: 1.3 K/UL (ref 0.8–3.5)
LYMPHOCYTES NFR BLD: 11 % (ref 12–49)
MCH RBC QN AUTO: 28.2 PG (ref 26–34)
MCHC RBC AUTO-ENTMCNC: 32.3 G/DL (ref 30–36.5)
MCV RBC AUTO: 87.3 FL (ref 80–99)
MONOCYTES # BLD: 0.9 K/UL (ref 0–1)
MONOCYTES NFR BLD: 8 % (ref 5–13)
NEUTS SEG # BLD: 9 K/UL (ref 1.8–8)
NEUTS SEG NFR BLD: 77 % (ref 32–75)
NRBC # BLD: 0 K/UL (ref 0–0.01)
NRBC BLD-RTO: 0 PER 100 WBC
PCO2 BLD: 37.6 MMHG (ref 35–45)
PH BLD: 7.39 [PH] (ref 7.35–7.45)
PHOSPHATE SERPL-MCNC: 4.5 MG/DL (ref 2.6–4.7)
PLATELET # BLD AUTO: 528 K/UL (ref 150–400)
PMV BLD AUTO: 10.5 FL (ref 8.9–12.9)
PO2 BLD: 35 MMHG (ref 80–100)
POTASSIUM SERPL-SCNC: 3.2 MMOL/L (ref 3.5–5.1)
RBC # BLD AUTO: 2.59 M/UL (ref 4.1–5.7)
SAO2 % BLD: 67 % (ref 92–97)
SODIUM SERPL-SCNC: 141 MMOL/L (ref 136–145)
SPECIMEN TYPE: ABNORMAL
WBC # BLD AUTO: 11.6 K/UL (ref 4.1–11.1)

## 2022-09-01 PROCEDURE — 80069 RENAL FUNCTION PANEL: CPT

## 2022-09-01 PROCEDURE — 82803 BLOOD GASES ANY COMBINATION: CPT

## 2022-09-01 PROCEDURE — 74011636637 HC RX REV CODE- 636/637: Performed by: NURSE PRACTITIONER

## 2022-09-01 PROCEDURE — 74018 RADEX ABDOMEN 1 VIEW: CPT

## 2022-09-01 PROCEDURE — 74011250637 HC RX REV CODE- 250/637: Performed by: STUDENT IN AN ORGANIZED HEALTH CARE EDUCATION/TRAINING PROGRAM

## 2022-09-01 PROCEDURE — 94003 VENT MGMT INPAT SUBQ DAY: CPT

## 2022-09-01 PROCEDURE — 85025 COMPLETE CBC W/AUTO DIFF WBC: CPT

## 2022-09-01 PROCEDURE — 36415 COLL VENOUS BLD VENIPUNCTURE: CPT

## 2022-09-01 PROCEDURE — 74011250637 HC RX REV CODE- 250/637: Performed by: ANESTHESIOLOGY

## 2022-09-01 PROCEDURE — 74011250637 HC RX REV CODE- 250/637: Performed by: INTERNAL MEDICINE

## 2022-09-01 PROCEDURE — 74011000250 HC RX REV CODE- 250: Performed by: NURSE PRACTITIONER

## 2022-09-01 PROCEDURE — 74011250637 HC RX REV CODE- 250/637: Performed by: NURSE PRACTITIONER

## 2022-09-01 PROCEDURE — 74011250636 HC RX REV CODE- 250/636: Performed by: ANESTHESIOLOGY

## 2022-09-01 RX ADMIN — COLLAGENASE SANTYL: 250 OINTMENT TOPICAL at 09:21

## 2022-09-01 RX ADMIN — MEXILETINE HYDROCHLORIDE 150 MG: 150 CAPSULE ORAL at 04:41

## 2022-09-01 RX ADMIN — APIXABAN 5 MG: 5 TABLET, FILM COATED ORAL at 04:41

## 2022-09-01 RX ADMIN — LORAZEPAM 0.5 MG: 1 TABLET ORAL at 09:58

## 2022-09-01 RX ADMIN — MINERAL SUPPLEMENT IRON 300 MG / 5 ML STRENGTH LIQUID 100 PER BOX UNFLAVORED 300 MG: at 16:01

## 2022-09-01 RX ADMIN — BUMETANIDE 1 MG: 1 TABLET ORAL at 08:00

## 2022-09-01 RX ADMIN — MIDODRINE HYDROCHLORIDE 20 MG: 5 TABLET ORAL at 05:36

## 2022-09-01 RX ADMIN — MIDODRINE HYDROCHLORIDE 20 MG: 5 TABLET ORAL at 14:37

## 2022-09-01 RX ADMIN — Medication 10 ML: at 05:36

## 2022-09-01 RX ADMIN — LORAZEPAM 0.5 MG: 1 TABLET ORAL at 17:46

## 2022-09-01 RX ADMIN — POTASSIUM BICARBONATE 40 MEQ: 782 TABLET, EFFERVESCENT ORAL at 08:00

## 2022-09-01 RX ADMIN — MINERAL SUPPLEMENT IRON 300 MG / 5 ML STRENGTH LIQUID 100 PER BOX UNFLAVORED 300 MG: at 11:56

## 2022-09-01 RX ADMIN — MEXILETINE HYDROCHLORIDE 150 MG: 150 CAPSULE ORAL at 16:01

## 2022-09-01 RX ADMIN — Medication 10 ML: at 14:37

## 2022-09-01 RX ADMIN — LEVOTHYROXINE SODIUM 50 MCG: 0.05 TABLET ORAL at 05:36

## 2022-09-01 RX ADMIN — FLUDROCORTISONE ACETATE 0.1 MG: 0.1 TABLET ORAL at 08:01

## 2022-09-01 RX ADMIN — APIXABAN 5 MG: 5 TABLET, FILM COATED ORAL at 16:01

## 2022-09-01 RX ADMIN — ACETAMINOPHEN 650 MG: 325 TABLET ORAL at 09:58

## 2022-09-01 RX ADMIN — METOCLOPRAMIDE 5 MG: 5 INJECTION, SOLUTION INTRAMUSCULAR; INTRAVENOUS at 00:14

## 2022-09-01 RX ADMIN — METOCLOPRAMIDE 5 MG: 5 INJECTION, SOLUTION INTRAMUSCULAR; INTRAVENOUS at 11:57

## 2022-09-01 RX ADMIN — Medication 5 UNITS: at 08:00

## 2022-09-01 RX ADMIN — MINERAL SUPPLEMENT IRON 300 MG / 5 ML STRENGTH LIQUID 100 PER BOX UNFLAVORED 300 MG: at 08:00

## 2022-09-01 RX ADMIN — CHLORHEXIDINE GLUCONATE 15 ML: 1.2 RINSE ORAL at 08:03

## 2022-09-01 RX ADMIN — METOCLOPRAMIDE 5 MG: 5 INJECTION, SOLUTION INTRAMUSCULAR; INTRAVENOUS at 05:36

## 2022-09-01 NOTE — PROGRESS NOTES
JADEN: Discharge to [facility] in [address] [place] [address]. Nursing staff call report to [phone] ext [phone]. Transportation ALS through [facility] at 2:30pm.     RUR: 17%     Emergency contact: Kelvin Choi, sister, [phone]  Almita Ortega, brother, [phone] or [phone]     Disposition: Patient admitted from [facility] 8/9 for YADY. Patient has a trach and is on a vent. Hx: paraplegia, skin ulcers, afib, chronic sepsis, cardiac arrest, CVA, chronic loya, tracheostomy ventilator, gtube, pulmonary embolism, systolic CHF, HTN, HLD, diabetes type II. Patient ready for discharge today. Transportation scheduled with [facility] for 2:30pm. CM updated nursing staff and [facility].      Sherif Staff, [facility],[address]  [phone]

## 2022-09-01 NOTE — PROGRESS NOTES
Pt stable throughout night but blood pressures remain soft. Pt has slept throughout most of the night. Pt has periods of pulling low volumes on the ventilator. Pt still remains in restraints secondary to intermittent confusion. Pt has had minimal output from urinary catheter and ostomy. Goal for patient is LTAC.

## 2022-09-01 NOTE — DISCHARGE SUMMARY
Discharge Summary     Patient: Jose Manuel Stack       MRN: [id number]       YOB: 1980       Age: 38 y.o. Date of admission:  8/8/2022    Date of discharge:  9/1/2022    Primary care provider:  Drake Page MD     Admitting provider:  Keren Rosenberg MD    Discharging provider(s): Guerda Jensen MD - Staff [address]     Consultations  IP CONSULT TO NEPHROLOGY  IP CONSULT TO GENERAL SURGERY  IP CONSULT TO CARDIOLOGY    Procedures  Colostomy  Wound debridement, VAC placement  Tracheostomy exchange    Discharge Condition: stable. Discharge Destination: [facility] in [address] [place] [address]. The patient is stable for discharge. Admission diagnosis  YADY (acute kidney injury) ([facility] [N17.9]    Please refer to the admission history and physical for details on the presenting problem. Final discharge diagnoses and brief hospital course    Chronic respiratory failure requiring mechanical ventilation  HFrEF  hypotension  Volume overload  Chronic Renal failure  Chronic decubitus ulcers  Multidrug resistant organism colonization  At risk for sepsis  Gastric outlet obstruction    pt is 49-year-old male with known past medical history of multiple chronic issues as listed in problem list, who presented to [facility] ED after being sent from [address] [place] [address] for possible need for CRRT for YADY due to inability to tolerate iHD due to sepsis/septic shock from unclear source. Patient remained intact improvement weaned off vasopressor support, transition to intermittent HD from CRRT. Patient with renal recovery, no longer requiring HD. Patient with multiple weaning attempts, occasionally tolerating trach collar however either tires out or has secretion issues requiring being placed back on vent. Status post colostomy placement 8/24 for assistance in maintaining chronic wounds clean, assist healing.      Continue PO and supplemental tube feeding, chronic wound care as per facility team. Continue vent weaning trials, pt tolerated intermittent time on/off ventilator. Physical examination at discharge  Visit Vitals  BP 94/71   Pulse 84   Temp 97.5 °F (36.4 °C)   Resp 18   Ht 5' 8\" (1.727 m)   Wt 62.4 kg (137 lb 9.1 oz)   SpO2 100%   BMI 20.92 kg/m²     Physical Exam  Vitals and nursing note reviewed. Constitutional:       General: He is awake. He is not in acute distress. Appearance: He is underweight. HENT:      Head: Normocephalic and atraumatic. Nose: Nose normal.      Mouth/Throat:      Comments: trach  Eyes:      General: Lids are normal.      Conjunctiva/sclera: Conjunctivae normal.      Pupils: Pupils are equal, round, and reactive to light. Neck:   Cardiovascular:      Rate and Rhythm: Normal rate. Rhythm irregularly irregular. Heart sounds: Normal heart sounds, S1 normal and S2 normal. No murmur heard. No friction rub. No gallop. Pulmonary:      Effort: Pulmonary effort is normal. No tachypnea. Breath sounds: Normal breath sounds and air entry. Comments: On vent  Abdominal:      General: The ostomy site is clean. Bowel sounds are normal.         Comments: colostomy   Musculoskeletal:      Right lower leg: No edema. Left lower leg: No edema. Comments: Chronic numerous pressure ulcer wounds throughout, present on admission see wound care note for further details   Skin:     General: Skin is warm and dry. Capillary Refill: Capillary refill takes 2 to 3 seconds. Coloration: Skin is pale. Findings: Wound present. Neurological:      Mental Status: He is alert, oriented to person, place, and time and easily aroused. Mental status is at baseline. GCS: GCS eye subscore is 4. GCS verbal subscore is 5. GCS motor subscore is 6. Psychiatric:         Behavior: Behavior is cooperative.       Recent Labs     09/01/22  0439 08/31/22  0417 08/30/22  0055   WBC 11.6* 13.7* 21.2*   HGB 7.3* 7.8* 7.8*   HCT 22.6* 24.1* 25.4* * 444* 497*     Recent Labs     09/01/22  0439 08/31/22 0418 08/31/22 0417 08/30/22  0055    140 139 138   K 3.2* 3.5 3.5 4.3    107 107 107   CO2 23 23 23 24   BUN 70* 68* 71* 70*   CREA 1.88* 1.74* 1.74* 1.88*   * 96 97 251*   CA 8.2* 8.4* 8.2* 8.4*   MG  --   --  1.7 1.8   PHOS 4.5  --  3.6 4.2     Recent Labs     09/01/22 0439 08/31/22 0418 08/31/22 0417   AP  --  153*  --    TP  --  6.5  --    ALB 1.8* 1.8* 1.9*   GLOB  --  4.7*  --      No results for input(s): INR, PTP, APTT, INREXT in the last 72 hours. No results for input(s): FE, TIBC, PSAT, FERR in the last 72 hours. Recent Labs     08/31/22  1147   PH 7.22*   PCO2 47*   PO2 99     No results for input(s): CPK, CKMB in the last 72 hours. No lab exists for component: TROPONINI  No components found for: [place]    Pertinent imaging studies:    Please see hospital course  ---------------------------------    Chronic Diagnoses:    Problem List as of 9/1/2022 Never Reviewed            Codes Class Noted - Resolved    Moderate protein-calorie malnutrition ([facility] ICD-10-CM: E44.0  ICD-9-CM: 263.0  8/19/2022 - Present        HFrEF (heart failure with reduced ejection fraction) ([facility] ICD-10-CM: I50.20  ICD-9-CM: 428.20  8/16/2022 - Present        YADY (acute kidney injury) ([facility] ICD-10-CM: N17.9  ICD-9-CM: 584.9  8/9/2022 - Present           Time spent on discharge related activities today greater than 30 minutes. Signed:       Hines Osler, MD   Staff [facility]    9/1/2022   10:10 AM            Cc: Bev Rodriguez MD

## 2022-09-01 NOTE — PROGRESS NOTES
0730 Dual verification of current restraint order and updated documentation completed with off going RN. Restraint type: Soft restraint:  right wrist and left wrist  Reason for restraints: Interference with medical equipment or treatment  Duration: 24 hours    Restraints must be removed when an alternative is available and effective and/or patient no longer meets criteria. Orders must be renewed every calendar day or when discontinued. The MD must conduct a face to face assessment within 1 calendar day of initiation when initial restraint order is verbal.     1000 Renewal was completed @bedside with RN/MD during IDR.

## 2022-09-01 NOTE — PROGRESS NOTES
0730:  Bedside shift change report given to [address] [place] [address] (oncoming nurse) by Sunitha Vaughan (offgoing nurse). Report included the following information SBAR, Kardex, Intake/Output, MAR, and Recent Results. Restraint type: Soft restraint:  right wrist and left wrist  Reason for restraints: Interference with medical equipment or treatment  Duration: 24 hours    Restraint order is current and reviewed with off going nurse. Documentation up to date. 1000: IDR held. Discussed plan regarding Pt transfer to [facility]) later this evening. Restraints reviewed and renewed per Fransisca Knowles MD.     1500: Called report on Pt to [facility]- all paperwork including EMTALA (and copy placed on chart) were sent with Pt. Per MD Pt sent with suprapubic catheter and midline. 1730: Pt picked up by transport.

## 2023-10-23 NOTE — PROGRESS NOTES
Shift summary:    Pt popped himself off of ventilator throughout the night. Pt also pulled arterial line partially out despite restraints being in place. Pt has required frequent redirection and orientation regarding his lines. Pt has also been preoccupied with wanting to eat and drink. Pt has been on Neosyneprhine on and off through the night. Have attempted to wean several times. Hydroxychloroquine Counseling:  I discussed with the patient that a baseline ophthalmologic exam is needed at the start of therapy and every year thereafter while on therapy. A CBC may also be warranted for monitoring.  The side effects of this medication were discussed with the patient, including but not limited to agranulocytosis, aplastic anemia, seizures, rashes, retinopathy, and liver toxicity. Patient instructed to call the office should any adverse effect occur.  The patient verbalized understanding of the proper use and possible adverse effects of Plaquenil.  All the patient's questions and concerns were addressed.

## (undated) DEVICE — TROCAR SITE CLOSURE DEVICE: Brand: ENDO CLOSE

## (undated) DEVICE — STERILE-Z MAYO STAND COVERS CLEAR POLYETHYLENE STERILE UNIVERSAL FIT 20 PER CASE: Brand: STERILE-Z

## (undated) DEVICE — DISSECTOR ULTRASONIC L39CM CRV JAW CRDLSS SONICISION

## (undated) DEVICE — Device

## (undated) DEVICE — TROCAR: Brand: KII® OPTICAL ACCESS SYSTEM

## (undated) DEVICE — TROCAR: Brand: KII® SLEEVE

## (undated) DEVICE — SKIN TEMPERATURE SENSOR: Brand: DEROYAL

## (undated) DEVICE — COLON CLOSING PACK: Brand: MEDLINE INDUSTRIES, INC.

## (undated) DEVICE — LAPAROSCOPIC TROCAR SLEEVE/SINGLE USE: Brand: KII® OPTICAL ACCESS SYSTEM

## (undated) DEVICE — GLOVE SURG SZ 75 L1212IN FNGR THK138MIL BRN LTX FREE

## (undated) DEVICE — SYSTEM EVAC SMOKE LAPARSCOPIC

## (undated) DEVICE — GENERAL LAPAROSCOPY - SMH: Brand: MEDLINE INDUSTRIES, INC.

## (undated) DEVICE — STAPLER INT L34CM 60MM LNG ENDOSCP ARTC PWR + ECHELON FLX

## (undated) DEVICE — RELOAD STPL L60MM H1-2.6MM MESENTERY THN TISS WHT 6 ROW

## (undated) DEVICE — GARMENT,MEDLINE,DVT,INT,CALF,MED, GEN2: Brand: MEDLINE

## (undated) DEVICE — WRAP SURG W1.31XL1.34M CARD FOR PT 165-172CM THERMOWRP